# Patient Record
Sex: FEMALE | Race: WHITE | NOT HISPANIC OR LATINO | Employment: PART TIME | URBAN - METROPOLITAN AREA
[De-identification: names, ages, dates, MRNs, and addresses within clinical notes are randomized per-mention and may not be internally consistent; named-entity substitution may affect disease eponyms.]

---

## 2017-08-02 ENCOUNTER — OFFICE VISIT (OUTPATIENT)
Dept: FAMILY MEDICINE CLINIC | Facility: CLINIC | Age: 52
End: 2017-08-02

## 2017-08-02 VITALS
OXYGEN SATURATION: 95 % | BODY MASS INDEX: 24.33 KG/M2 | SYSTOLIC BLOOD PRESSURE: 100 MMHG | HEIGHT: 67 IN | TEMPERATURE: 98.6 F | WEIGHT: 155 LBS | DIASTOLIC BLOOD PRESSURE: 60 MMHG | HEART RATE: 85 BPM

## 2017-08-02 DIAGNOSIS — K64.4 HEMORRHOIDS, EXTERNAL: Primary | ICD-10-CM

## 2017-08-02 PROCEDURE — 99213 OFFICE O/P EST LOW 20 MIN: CPT | Performed by: FAMILY MEDICINE

## 2017-08-02 RX ORDER — ZOLPIDEM TARTRATE 5 MG/1
5 TABLET ORAL NIGHTLY PRN
Qty: 30 TABLET | Refills: 5 | Status: SHIPPED | OUTPATIENT
Start: 2017-08-02 | End: 2018-03-05 | Stop reason: SDUPTHER

## 2017-08-02 RX ORDER — HYDROCORTISONE ACETATE SUPPOSITORY 30 MG/1
1 SUPPOSITORY RECTAL 2 TIMES DAILY
Qty: 24 EACH | Refills: 0 | Status: SHIPPED | OUTPATIENT
Start: 2017-08-02 | End: 2017-09-26

## 2017-08-02 RX ORDER — CLOTRIMAZOLE AND BETAMETHASONE DIPROPIONATE 10; .64 MG/G; MG/G
CREAM TOPICAL 2 TIMES DAILY
Qty: 15 G | Refills: 2 | Status: SHIPPED | OUTPATIENT
Start: 2017-08-02 | End: 2017-09-26

## 2017-08-02 RX ORDER — PAROXETINE 30 MG/1
30 TABLET, FILM COATED ORAL EVERY MORNING
Qty: 30 TABLET | Refills: 5 | Status: SHIPPED | OUTPATIENT
Start: 2017-08-02 | End: 2017-12-13 | Stop reason: SDUPTHER

## 2017-08-02 NOTE — PROGRESS NOTES
Subjective   Sakina Casillas is a 52 y.o. female presenting with   Chief Complaint   Patient presents with   • Hemorrhoids   • Med Refill     PAXIL  AND ZOLPIDEM    • Referral     for colonoscopy         HPI Comments: 52-year-old  white female just moved back here from [place] after her  had an affair with a woman from his home town in [place].  This has been 10 months ago and she is now dating and practicing safe sex.  However for the last 2 months she has had bleeding hemorrhoids and a sore area near her sacrum    Hemorrhoids          The following portions of the patient's history were reviewed and updated as appropriate: current medications, past family history, past medical history, past social history, past surgical history and problem list.    Review of Systems   Gastrointestinal: Positive for anal bleeding, hemorrhoids and rectal pain.   All other systems reviewed and are negative.      Objective   Physical Exam   Constitutional: She is oriented to person, place, and time. She appears well-developed and well-nourished.   HENT:   Head: Normocephalic and atraumatic.   Eyes: EOM are normal. Pupils are equal, round, and reactive to light.   Abdominal: Soft. Bowel sounds are normal. She exhibits no distension. There is no tenderness.   Genitourinary:       There is no rash or tenderness on the right labia.   Musculoskeletal: Normal range of motion.   Neurological: She is alert and oriented to person, place, and time.   Skin: Skin is warm and dry.   Psychiatric: She has a normal mood and affect. Her behavior is normal.   Nursing note and vitals reviewed.      Assessment/Plan   Sakina was seen today for hemorrhoids, med refill and referral.    Diagnoses and all orders for this visit:    Hemorrhoids, external  -     Ambulatory Referral to Colorectal Surgery    Other orders  -     zolpidem (AMBIEN) 5 MG tablet; Take 1 tablet by mouth At Night As Needed (as needed for sleep). For sleep  -     PARoxetine  (PAXIL) 30 MG tablet; Take 1 tablet by mouth Every Morning.  -     clotrimazole-betamethasone (LOTRISONE) 1-0.05 % cream; Apply  topically 2 (Two) Times a Day.  -     Hydrocortisone Acetate 30 MG suppository; Insert 1 suppository into the rectum 2 (Two) Times a Day.                   I would like him to return for another visit in 6 month(s)

## 2017-08-02 NOTE — PATIENT INSTRUCTIONS
This is a very nice 52-year-old who has intermittent bleeding external hemorrhoids and dermatitis.  I will send out suppositories and cream.  I also will request a consult since she is due for screening colonoscopy.

## 2017-08-07 ENCOUNTER — TELEPHONE (OUTPATIENT)
Dept: FAMILY MEDICINE CLINIC | Facility: CLINIC | Age: 52
End: 2017-08-07

## 2017-08-07 NOTE — TELEPHONE ENCOUNTER
Pt called stating the medication she got Friday for the anal fistula is not helping at all.  She stated you told her you could send in something else if the medication does not help.  She did not say which medication is not working.  I tried to call her but no answer.

## 2017-09-26 ENCOUNTER — OFFICE VISIT (OUTPATIENT)
Dept: SURGERY | Facility: CLINIC | Age: 52
End: 2017-09-26

## 2017-09-26 VITALS
OXYGEN SATURATION: 98 % | SYSTOLIC BLOOD PRESSURE: 110 MMHG | DIASTOLIC BLOOD PRESSURE: 78 MMHG | WEIGHT: 162.8 LBS | HEIGHT: 67 IN | HEART RATE: 80 BPM | BODY MASS INDEX: 25.55 KG/M2 | TEMPERATURE: 97.9 F

## 2017-09-26 DIAGNOSIS — Z12.11 ENCOUNTER FOR SCREENING COLONOSCOPY: ICD-10-CM

## 2017-09-26 DIAGNOSIS — K64.5 THROMBOSED HEMORRHOIDS: Primary | ICD-10-CM

## 2017-09-26 PROCEDURE — 99244 OFF/OP CNSLTJ NEW/EST MOD 40: CPT | Performed by: COLON & RECTAL SURGERY

## 2017-09-26 RX ORDER — SODIUM CHLORIDE, SODIUM LACTATE, POTASSIUM CHLORIDE, CALCIUM CHLORIDE 600; 310; 30; 20 MG/100ML; MG/100ML; MG/100ML; MG/100ML
30 INJECTION, SOLUTION INTRAVENOUS CONTINUOUS
Status: CANCELLED | OUTPATIENT
Start: 2017-11-09

## 2017-09-26 NOTE — PROGRESS NOTES
Sakina Casillas is a 52 y.o. female who is seen as a consult at the request of Maciel De Los Santos MD for rectal bleeding, constipation    HPI:    Pt states she had a bout of constipation a few weeks ago  Had some bleeding and a fissure  She was moving from [place] to [place]    Has never had a colonoscopy    Fissure beginning 8 weeks ago    Using miralax, which has helped    FamHx: colon polyps mother     HC suppository from Dr. De Los Santos: did not use  1st ointment from Dr. De Los Santos lotrisone did not help; is now trying bactroban, which has helped    Not having any blood now    No pain with BMs    Does c/o swelling    Has never had anorectal surgery    Taking spironolactone for hormonal acne; has not noted any constipation due to this    Past Medical History:   Diagnosis Date   • Acquired hypothyroidism    • Anxiety    • Depression    • Hemorrhoids    • Joint pain     elbow   • Primary insomnia        Past Surgical History:   Procedure Laterality Date   • BREAST SURGERY Bilateral     augmentation   •  SECTION     • FOOT SURGERY Right 2016   • GALLBLADDER SURGERY  2016    done [place]; physician unknown   • NEUROMA SURGERY Right 02/10/2016    right third intermetatarsal space; [facility]; Lee James DPM       Social History:   reports that she has never smoked. She has never used smokeless tobacco. She reports that she does not drink alcohol.      Marriage status:     Family History   Problem Relation Age of Onset   • Heart disease Mother    • Cancer Mother      colon   • Hypertension Mother    • Depression Mother    • Heart disease Father    • Depression Father    • No Known Problems Daughter    • No Known Problems Son          Current Outpatient Prescriptions:   •  butalbital-aspirin-caffeine (FIORINAL) -40 MG per capsule, Take 1 capsule by mouth every 6 (six) hours as needed for headaches., Disp: 20 capsule, Rfl: 0  •  clotrimazole-betamethasone (LOTRISONE) 1-0.05 % cream, Apply   topically 2 (Two) Times a Day., Disp: 15 g, Rfl: 2  •  cycloSPORINE (RESTASIS) 0.05 % ophthalmic emulsion, Apply to eye., Disp: , Rfl:   •  Hydrocortisone Acetate 30 MG suppository, Insert 1 suppository into the rectum 2 (Two) Times a Day., Disp: 24 each, Rfl: 0  •  mupirocin (BACTROBAN) 2 % ointment, Apply  topically 2 (Two) Times a Day., Disp: 30 g, Rfl: 2  •  PARoxetine (PAXIL) 30 MG tablet, Take 1 tablet by mouth Every Morning., Disp: 30 tablet, Rfl: 5  •  spironolactone (ALDACTONE) 25 MG tablet, TAKE 1 TABLET BY MOUTH EVERY DAY, Disp: 30 tablet, Rfl: 1  •  zolpidem (AMBIEN) 5 MG tablet, Take 1 tablet by mouth At Night As Needed (as needed for sleep). For sleep, Disp: 30 tablet, Rfl: 5    Allergy  Review of patient's allergies indicates no known allergies.    Review of Systems   Constitution: Negative for decreased appetite, weakness and weight gain.   HENT: Negative for congestion, hearing loss and hoarse voice.    Eyes: Negative for blurred vision, discharge and visual disturbance.   Cardiovascular: Negative for chest pain, cyanosis and leg swelling.   Respiratory: Negative for cough, shortness of breath, sleep disturbances due to breathing and snoring.    Endocrine: Negative for cold intolerance and heat intolerance.   Hematologic/Lymphatic: Does not bruise/bleed easily.   Skin: Negative for itching, poor wound healing and skin cancer.   Musculoskeletal: Positive for arthritis. Negative for back pain, joint pain and joint swelling.   Gastrointestinal: Positive for constipation. Negative for abdominal pain, change in bowel habit and bowel incontinence.   Genitourinary: Negative for bladder incontinence, dysuria and hematuria.   Neurological: Negative for brief paralysis, excessive daytime sleepiness, dizziness, focal weakness, headaches and light-headedness.   Psychiatric/Behavioral: Negative for altered mental status and hallucinations. The patient does not have insomnia.    Allergic/Immunologic: Negative for  HIV exposure and persistent infections.       Vitals:    09/26/17 0847   BP: 110/78   Pulse: 80   Temp: 97.9 °F (36.6 °C)   SpO2: 98%     Body mass index is 25.5 kg/(m^2).    Physical Exam   Constitutional: She is oriented to person, place, and time. She appears well-developed and well-nourished. No distress.   HENT:   Head: Normocephalic and atraumatic.   Nose: Nose normal.   Mouth/Throat: Oropharynx is clear and moist.   Eyes: Conjunctivae and EOM are normal. Pupils are equal, round, and reactive to light.   Neck: Normal range of motion. No tracheal deviation present.   Pulmonary/Chest: Effort normal and breath sounds normal. No respiratory distress.   Abdominal: Soft. Bowel sounds are normal. She exhibits no distension.   Genitourinary:   Genitourinary Comments: Perianal exam: external hem - small resolving posterior thrombosed hemorrhoid.  No ulceration, no evidence incarceration. No fissure visualized.  Minor tags x3   Musculoskeletal: Normal range of motion. She exhibits no edema or deformity.   Neurological: She is alert and oriented to person, place, and time. No cranial nerve deficit. Coordination and gait normal.   Skin: Skin is warm and dry.   Psychiatric: She has a normal mood and affect. Her behavior is normal. Judgment normal.       Review of Medical Record:  I reviewed Dr. De Los Santos OV note: rb, anal pain    Assessment:  1. Thrombosed hemorrhoids    2. Encounter for screening colonoscopy        Plan:    Can d/c bactroban cream.  Discussed with patient that as thrombosed hemorrhoid resolving, no indication for surgical excision, as her body will continue to resorb clot.    To optimize stool consistency and help prevent future hemorrhoidal issues, I recommend fiber therapy and detailed and gave written instructions on how to achieve a high fiber diet.    As pt is 52 years old and has never had a colonoscopy, I recommend doing a screening colonoscopy.  I described the patient risks benefits and alternatives  and she wishes to proceed.      Scribed for Jovanni Bradshaw MD by Ariana Wall PA-C 9/26/2017  This patient was evaluated by me, recommendations made, documentation reviewed, edited, and revised by me, Jovanni Bradshaw MD

## 2017-10-06 ENCOUNTER — OFFICE VISIT (OUTPATIENT)
Dept: FAMILY MEDICINE CLINIC | Facility: CLINIC | Age: 52
End: 2017-10-06

## 2017-10-06 VITALS
HEART RATE: 87 BPM | OXYGEN SATURATION: 97 % | TEMPERATURE: 98.5 F | HEIGHT: 67 IN | DIASTOLIC BLOOD PRESSURE: 60 MMHG | SYSTOLIC BLOOD PRESSURE: 98 MMHG | WEIGHT: 164 LBS | BODY MASS INDEX: 25.74 KG/M2

## 2017-10-06 DIAGNOSIS — M25.562 ACUTE PAIN OF LEFT KNEE: Primary | ICD-10-CM

## 2017-10-06 DIAGNOSIS — E03.9 ACQUIRED HYPOTHYROIDISM: ICD-10-CM

## 2017-10-06 PROCEDURE — 73560 X-RAY EXAM OF KNEE 1 OR 2: CPT | Performed by: FAMILY MEDICINE

## 2017-10-06 PROCEDURE — 99213 OFFICE O/P EST LOW 20 MIN: CPT | Performed by: FAMILY MEDICINE

## 2017-10-06 NOTE — PROGRESS NOTES
Subjective   Sakina Casillas is a 52 y.o. female presenting with   Chief Complaint   Patient presents with   • Knee Pain     twisted left knee x 3 weeks ago         HPI Comments: 3 weeks ago this 52-year-old white female nonsmoker twisted her knee.  She says it has continued to hurt despite the use of an over-the-counter knee brace.  She says it feels like there is something wrong internally.    She does not have any history of past injury to that knee and has not had surgery on that knee.  She denies that there is any other joint pain.    Knee Pain           The following portions of the patient's history were reviewed and updated as appropriate: current medications, past family history, past medical history, past social history, past surgical history and problem list.    Review of Systems   Musculoskeletal: Positive for arthralgias and joint swelling.   All other systems reviewed and are negative.      Objective   Physical Exam   Constitutional: She is oriented to person, place, and time. She appears well-developed and well-nourished. No distress.   HENT:   Head: Normocephalic and atraumatic.   Eyes: EOM are normal. Pupils are equal, round, and reactive to light.   Neck: Normal range of motion. Neck supple.   Musculoskeletal: She exhibits edema and tenderness. She exhibits no deformity.        Left knee: She exhibits effusion and abnormal meniscus. She exhibits no erythema, no LCL laxity and no MCL laxity. Tenderness found. No MCL and no LCL tenderness noted.        Legs:  Neurological: She is alert and oriented to person, place, and time.   Skin: Skin is warm and dry. She is not diaphoretic.   Psychiatric: She has a normal mood and affect. Her behavior is normal.   Nursing note and vitals reviewed.      Assessment/Plan   Sakina was seen today for knee pain.    Diagnoses and all orders for this visit:    Acute pain of left knee  -     XR Knee 1 or 2 View Left    Acquired hypothyroidism                   I would like him  to return for another visit in 6 month(s)

## 2017-10-06 NOTE — PATIENT INSTRUCTIONS
This is a very nice 52-year-old with acute left knee pain after twisting it 3 weeks ago.  I will request an orthopedic consult since there is nothing obvious on x-ray to explain her discomfort.

## 2017-10-06 NOTE — PROGRESS NOTES
Procedure   Procedures        X Ray report:    To further evaluate her complaint of knee pain for the last 3 weeks I have requested a knee x-ray here today.  There are no old x-rays to compare this to.  This shows good preservation of the joint space with minor effusion.  There is slight calcific tendinitis on the medial aspect of the knee but there is no subluxation or foreign body or fracture or dislocation.

## 2017-11-09 ENCOUNTER — ANESTHESIA (OUTPATIENT)
Dept: GASTROENTEROLOGY | Facility: HOSPITAL | Age: 52
End: 2017-11-09

## 2017-11-09 ENCOUNTER — HOSPITAL ENCOUNTER (OUTPATIENT)
Facility: HOSPITAL | Age: 52
Setting detail: HOSPITAL OUTPATIENT SURGERY
Discharge: HOME | End: 2017-11-09
Attending: COLON & RECTAL SURGERY | Admitting: COLON & RECTAL SURGERY

## 2017-11-09 ENCOUNTER — ANESTHESIA EVENT (OUTPATIENT)
Dept: GASTROENTEROLOGY | Facility: HOSPITAL | Age: 52
End: 2017-11-09

## 2017-11-09 VITALS
DIASTOLIC BLOOD PRESSURE: 70 MMHG | HEART RATE: 72 BPM | OXYGEN SATURATION: 100 % | WEIGHT: 162 LBS | TEMPERATURE: 97.2 F | SYSTOLIC BLOOD PRESSURE: 118 MMHG | BODY MASS INDEX: 25.43 KG/M2 | RESPIRATION RATE: 16 BRPM | HEIGHT: 67 IN

## 2017-11-09 DIAGNOSIS — Z12.11 ENCOUNTER FOR SCREENING COLONOSCOPY: ICD-10-CM

## 2017-11-09 PROCEDURE — 45378 DIAGNOSTIC COLONOSCOPY: CPT | Performed by: COLON & RECTAL SURGERY

## 2017-11-09 PROCEDURE — 25010000002 PROPOFOL 10 MG/ML EMULSION: Performed by: ANESTHESIOLOGY

## 2017-11-09 RX ORDER — PROPOFOL 10 MG/ML
VIAL (ML) INTRAVENOUS AS NEEDED
Status: DISCONTINUED | OUTPATIENT
Start: 2017-11-09 | End: 2017-11-09 | Stop reason: SURG

## 2017-11-09 RX ORDER — PROPOFOL 10 MG/ML
VIAL (ML) INTRAVENOUS CONTINUOUS PRN
Status: DISCONTINUED | OUTPATIENT
Start: 2017-11-09 | End: 2017-11-09 | Stop reason: SURG

## 2017-11-09 RX ORDER — SODIUM CHLORIDE, SODIUM LACTATE, POTASSIUM CHLORIDE, CALCIUM CHLORIDE 600; 310; 30; 20 MG/100ML; MG/100ML; MG/100ML; MG/100ML
30 INJECTION, SOLUTION INTRAVENOUS CONTINUOUS
Status: DISCONTINUED | OUTPATIENT
Start: 2017-11-09 | End: 2017-11-09 | Stop reason: HOSPADM

## 2017-11-09 RX ORDER — LIDOCAINE HYDROCHLORIDE 20 MG/ML
INJECTION, SOLUTION INFILTRATION; PERINEURAL AS NEEDED
Status: DISCONTINUED | OUTPATIENT
Start: 2017-11-09 | End: 2017-11-09 | Stop reason: SURG

## 2017-11-09 RX ADMIN — LIDOCAINE HYDROCHLORIDE 50 MG: 20 INJECTION, SOLUTION INFILTRATION; PERINEURAL at 12:22

## 2017-11-09 RX ADMIN — SODIUM CHLORIDE, POTASSIUM CHLORIDE, SODIUM LACTATE AND CALCIUM CHLORIDE 30 ML/HR: 600; 310; 30; 20 INJECTION, SOLUTION INTRAVENOUS at 11:07

## 2017-11-09 RX ADMIN — PROPOFOL 140 MG: 10 INJECTION, EMULSION INTRAVENOUS at 12:22

## 2017-11-09 RX ADMIN — PROPOFOL 140 MCG/KG/MIN: 10 INJECTION, EMULSION INTRAVENOUS at 12:24

## 2017-11-09 NOTE — ANESTHESIA PREPROCEDURE EVALUATION
Anesthesia Evaluation     Patient summary reviewed and Nursing notes reviewed   no history of anesthetic complications:  NPO Solid Status: > 8 hours  NPO Liquid Status: > 2 hours     Airway   Mallampati: II  TM distance: >3 FB  Neck ROM: full  no difficulty expected  Dental - normal exam     Pulmonary - negative pulmonary ROS and normal exam    breath sounds clear to auscultation  Cardiovascular - negative cardio ROS and normal exam    Rhythm: regular  Rate: normal        Neuro/Psych- negative ROS  GI/Hepatic/Renal/Endo    (+)  hypothyroidism,     Musculoskeletal (-) negative ROS    Abdominal  - normal exam   Substance History - negative use     OB/GYN negative ob/gyn ROS         Other - negative ROS                                       Anesthesia Plan    ASA 2     MAC     intravenous induction   Anesthetic plan and risks discussed with patient.

## 2017-11-09 NOTE — H&P
Sakina Casillas is a 52 y.o. female  who is referred by Jovanni Bradshaw MD for a colonoscopy. She is an  has a history of screening for colon cancer.     She denies any change in bowel function, melena, or hematochezia.    Past Medical History:   Diagnosis Date   • Acquired hypothyroidism    • Anxiety    • Depression    • Hemorrhoids    • Joint pain     elbow   • Primary insomnia        Past Surgical History:   Procedure Laterality Date   • BREAST SURGERY Bilateral     augmentation   •  SECTION     • FOOT SURGERY Right 2016   • GALLBLADDER SURGERY  2016    done [place]; physician unknown   • NEUROMA SURGERY Right 02/10/2016    right third intermetatarsal space; [facility]; Lee James DPM       Prescriptions Prior to Admission   Medication Sig Dispense Refill Last Dose   • butalbital-aspirin-caffeine (FIORINAL) -40 MG per capsule Take 1 capsule by mouth every 6 (six) hours as needed for headaches. 20 capsule 0 2017 at Unknown time   • cycloSPORINE (RESTASIS) 0.05 % ophthalmic emulsion Apply to eye.   2017 at Unknown time   • PARoxetine (PAXIL) 30 MG tablet Take 1 tablet by mouth Every Morning. 30 tablet 5 Past Week at Unknown time   • spironolactone (ALDACTONE) 25 MG tablet TAKE 1 TABLET BY MOUTH EVERY DAY 30 tablet 1 Past Week at Unknown time   • zolpidem (AMBIEN) 5 MG tablet Take 1 tablet by mouth At Night As Needed (as needed for sleep). For sleep 30 tablet 5 Past Week at Unknown time       No Known Allergies    Family History   Problem Relation Age of Onset   • Heart disease Mother    • Cancer Mother      colon   • Hypertension Mother    • Depression Mother    • Heart disease Father    • Depression Father    • No Known Problems Daughter    • No Known Problems Son        Social History     Social History   • Marital status:      Spouse name: N/A   • Number of children: N/A   • Years of education: N/A     Occupational History   • Not on file.     Social History Main Topics    • Smoking status: Never Smoker   • Smokeless tobacco: Never Used   • Alcohol use No   • Drug use: Not on file   • Sexual activity: Not on file     Other Topics Concern   • Not on file     Social History Narrative       Review of Systems   Gastrointestinal: Negative for abdominal pain, nausea and vomiting.   All other systems reviewed and are negative.      Vitals:    11/09/17 1101   BP: 104/63   Pulse: 74   Resp: 18   Temp: 98.2 °F (36.8 °C)   SpO2: 96%         Physical Exam   Constitutional: She is oriented to person, place, and time. She appears well-developed and well-nourished.   HENT:   Head: Normocephalic and atraumatic.   Eyes: EOM are normal. Pupils are equal, round, and reactive to light.   Cardiovascular: Regular rhythm.    Pulmonary/Chest: Effort normal.   Abdominal: Soft. She exhibits no distension.   Musculoskeletal: Normal range of motion.   Neurological: She is alert and oriented to person, place, and time.   Skin: Skin is warm and dry.   Psychiatric: Judgment and thought content normal.         Assessment/Plan      screening for colon cancer.         I recommend colonoscopy.  I described risks, benefits of the procedure with the patient including but not limited to bleeding, infection, possibility of perforation and possible polypectomy. All of the patient's questions were answered and they would like to proceed with the above recommendations.

## 2017-11-09 NOTE — ANESTHESIA POSTPROCEDURE EVALUATION
Patient: Sakina Casillas    Procedure Summary     Date Anesthesia Start Anesthesia Stop Room / Location    11/09/17 1220 1249  [facility] /  [facility]       Procedure Diagnosis Surgeon Provider    COLONOSCOPY TO [facility]EC[facility] (N/A ) Encounter for screening colonoscopy  (Encounter for screening colonoscopy [Z12.11]) MD Cesia Singer MD          Anesthesia Type: MAC  Last vitals  BP   118/70 (11/09/17 1318)   Temp   36.2 °C (97.2 °F) (11/09/17 1310)   Pulse   72 (11/09/17 1318)   Resp   16 (11/09/17 1318)     SpO2   100 % (11/09/17 1318)     Post Anesthesia Care and Evaluation    Patient location during evaluation: PACU  Patient participation: complete - patient participated  Level of consciousness: awake  Pain management: adequate  Airway patency: patent  Anesthetic complications: No anesthetic complications  PONV Status: none  Cardiovascular status: acceptable  Respiratory status: acceptable  Hydration status: acceptable

## 2017-11-09 NOTE — PLAN OF CARE
Problem: Patient Care Overview (Adult)  Goal: Adult Individualization and Mutuality  Outcome: Ongoing (interventions implemented as appropriate)    11/09/17 1059   Individualization   Patient Specific Interventions denies       Goal: Discharge Needs Assessment  Outcome: Ongoing (interventions implemented as appropriate)    11/09/17 1059   Discharge Needs Assessment   Concerns To Be Addressed no discharge needs identified   Discharge Disposition home or self-care   Self-Care   Equipment Currently Used at Home none         Problem: GI Endoscopy (Adult)  Goal: Signs and Symptoms of Listed Potential Problems Will be Absent or Manageable (GI Endoscopy)  Outcome: Ongoing (interventions implemented as appropriate)    11/09/17 1059   GI Endoscopy   Problems Assessed (GI Endoscopy) pain;bleeding;fluid imbalance;hypoxia/hypoxemia   Problems Present (GI Endoscopy) none

## 2017-12-13 ENCOUNTER — OFFICE VISIT (OUTPATIENT)
Dept: FAMILY MEDICINE CLINIC | Facility: CLINIC | Age: 52
End: 2017-12-13

## 2017-12-13 VITALS
BODY MASS INDEX: 26.21 KG/M2 | DIASTOLIC BLOOD PRESSURE: 62 MMHG | HEIGHT: 67 IN | OXYGEN SATURATION: 98 % | WEIGHT: 167 LBS | TEMPERATURE: 98.4 F | HEART RATE: 73 BPM | SYSTOLIC BLOOD PRESSURE: 100 MMHG

## 2017-12-13 DIAGNOSIS — E03.9 ACQUIRED HYPOTHYROIDISM: Primary | ICD-10-CM

## 2017-12-13 DIAGNOSIS — F51.01 PRIMARY INSOMNIA: ICD-10-CM

## 2017-12-13 DIAGNOSIS — N95.1 MENOPAUSE SYNDROME: ICD-10-CM

## 2017-12-13 DIAGNOSIS — H60.312 ACUTE DIFFUSE OTITIS EXTERNA OF LEFT EAR: ICD-10-CM

## 2017-12-13 PROCEDURE — 99213 OFFICE O/P EST LOW 20 MIN: CPT | Performed by: FAMILY MEDICINE

## 2017-12-13 RX ORDER — PAROXETINE 30 MG/1
30 TABLET, FILM COATED ORAL EVERY MORNING
Qty: 30 TABLET | Refills: 5 | Status: SHIPPED | OUTPATIENT
Start: 2017-12-13 | End: 2018-06-18 | Stop reason: SDUPTHER

## 2017-12-13 RX ORDER — SPIRONOLACTONE 25 MG/1
25 TABLET ORAL DAILY
Qty: 90 TABLET | Refills: 1 | Status: SHIPPED | OUTPATIENT
Start: 2017-12-13 | End: 2018-06-24 | Stop reason: SDUPTHER

## 2017-12-13 NOTE — PATIENT INSTRUCTIONS
This is a very nice 52-year-old who is here for follow-up and also has been experiencing menopause symptoms.  I will request blood work and notify her when the results are available.

## 2017-12-13 NOTE — PROGRESS NOTES
Subjective   Sakina Casillas is a 52 y.o. female presenting with   Chief Complaint   Patient presents with   • Medication Check Up   • Med Refill     paxil and spironolactone         HPI Comments: 52-year-old  white female nonsmoker here for routine follow-up for anxiety.  She says that the medication is working well and she just needs a refill.    She also says she has not had a menstrual period in 13 months and she has begun to develop symptoms she thinks is menopause.  This includes trouble sleeping and occasional hot flashes.  She would like to have her hormones checked.    She also says she has unexpectedly gained 5 pounds and is concerned that she needs her thyroid rechecked.  She is requesting we do that today.    She also mentions that she put a Q-tip in her left ear and now her left ear is hurting.  I asked her to never do that again and explained that the ear canal was very fragile and you can cut it even with a Q-tip.       The following portions of the patient's history were reviewed and updated as appropriate: current medications, past family history, past medical history, past social history, past surgical history and problem list.    Review of Systems   Constitutional: Positive for unexpected weight change.   HENT: Positive for ear pain.    Psychiatric/Behavioral: Positive for sleep disturbance. The patient is nervous/anxious.    All other systems reviewed and are negative.      Objective   Physical Exam   Constitutional: She is oriented to person, place, and time. She appears well-developed and well-nourished. No distress.   HENT:   Head: Normocephalic and atraumatic.   Right Ear: External ear normal.   Left Ear: Left ear exhibits lacerations.   Ears:    Mouth/Throat: Oropharynx is clear and moist.   Eyes: EOM are normal. Pupils are equal, round, and reactive to light.   Neck: Normal range of motion. Neck supple. No thyromegaly present.   Cardiovascular: Normal rate and regular rhythm.     Pulmonary/Chest: Effort normal and breath sounds normal.   Musculoskeletal: Normal range of motion. She exhibits no edema or tenderness.   Lymphadenopathy:     She has no cervical adenopathy.   Neurological: She is alert and oriented to person, place, and time.   Skin: Skin is warm and dry. She is not diaphoretic.   Psychiatric: She has a normal mood and affect. Her behavior is normal.   Nursing note and vitals reviewed.      Assessment/Plan   Sakina was seen today for medication check up and med refill.    Diagnoses and all orders for this visit:    Acquired hypothyroidism  -     Comprehensive Metabolic Panel  -     TSH  -     CBC & Differential  -     T4, Free    Primary insomnia    Menopause syndrome  -     Comprehensive Metabolic Panel  -     TSH  -     CBC & Differential  -     T4, Free  -     FSH & LH    Acute diffuse otitis externa of left ear    Other orders  -     neomycin-polymyxin-hydrocortisone (CORTISPORIN) 3.5-63594-1 otic solution; Administer 3 drops into the left ear 3 (Three) Times a Day.  -     PARoxetine (PAXIL) 30 MG tablet; Take 1 tablet by mouth Every Morning.  -     spironolactone (ALDACTONE) 25 MG tablet; Take 1 tablet by mouth Daily.                   I would like him to return for another visit in 6 month(s)

## 2017-12-15 LAB
ALBUMIN SERPL-MCNC: 4.1 G/DL (ref 3.5–5.2)
ALBUMIN/GLOB SERPL: 1.3 G/DL
ALP SERPL-CCNC: 83 U/L (ref 39–117)
ALT SERPL-CCNC: 18 U/L (ref 1–33)
AST SERPL-CCNC: 21 U/L (ref 1–32)
BASOPHILS # BLD AUTO: 0.03 10*3/MM3 (ref 0–0.2)
BASOPHILS NFR BLD AUTO: 0.4 % (ref 0–1.5)
BILIRUB SERPL-MCNC: 0.2 MG/DL (ref 0.1–1.2)
BUN SERPL-MCNC: 16 MG/DL (ref 6–20)
BUN/CREAT SERPL: 21.3 (ref 7–25)
CALCIUM SERPL-MCNC: 8.7 MG/DL (ref 8.6–10.5)
CHLORIDE SERPL-SCNC: 102 MMOL/L (ref 98–107)
CO2 SERPL-SCNC: 28 MMOL/L (ref 22–29)
CREAT SERPL-MCNC: 0.75 MG/DL (ref 0.57–1)
EOSINOPHIL # BLD AUTO: 0.05 10*3/MM3 (ref 0–0.7)
EOSINOPHIL NFR BLD AUTO: 0.6 % (ref 0.3–6.2)
ERYTHROCYTE [DISTWIDTH] IN BLOOD BY AUTOMATED COUNT: 13.3 % (ref 11.7–13)
FSH SERPL-ACNC: 67.1 MIU/ML
GFR SERPLBLD CREATININE-BSD FMLA CKD-EPI: 81 ML/MIN/1.73
GFR SERPLBLD CREATININE-BSD FMLA CKD-EPI: 99 ML/MIN/1.73
GLOBULIN SER CALC-MCNC: 3.2 GM/DL
GLUCOSE SERPL-MCNC: 117 MG/DL (ref 65–99)
HCT VFR BLD AUTO: 42.5 % (ref 35.6–45.5)
HGB BLD-MCNC: 13.2 G/DL (ref 11.9–15.5)
IMM GRANULOCYTES # BLD: 0 10*3/MM3 (ref 0–0.03)
IMM GRANULOCYTES NFR BLD: 0 % (ref 0–0.5)
LH SERPL-ACNC: 37.6 MIU/ML
LYMPHOCYTES # BLD AUTO: 2.37 10*3/MM3 (ref 0.9–4.8)
LYMPHOCYTES NFR BLD AUTO: 29.9 % (ref 19.6–45.3)
MCH RBC QN AUTO: 29.7 PG (ref 26.9–32)
MCHC RBC AUTO-ENTMCNC: 31.1 G/DL (ref 32.4–36.3)
MCV RBC AUTO: 95.7 FL (ref 80.5–98.2)
MONOCYTES # BLD AUTO: 0.31 10*3/MM3 (ref 0.2–1.2)
MONOCYTES NFR BLD AUTO: 3.9 % (ref 5–12)
NEUTROPHILS # BLD AUTO: 5.16 10*3/MM3 (ref 1.9–8.1)
NEUTROPHILS NFR BLD AUTO: 65.2 % (ref 42.7–76)
PLATELET # BLD AUTO: 233 10*3/MM3 (ref 140–500)
POTASSIUM SERPL-SCNC: 4.1 MMOL/L (ref 3.5–5.2)
PROT SERPL-MCNC: 7.3 G/DL (ref 6–8.5)
RBC # BLD AUTO: 4.44 10*6/MM3 (ref 3.9–5.2)
SODIUM SERPL-SCNC: 140 MMOL/L (ref 136–145)
T4 FREE SERPL-MCNC: 1.22 NG/DL (ref 0.93–1.7)
TSH SERPL DL<=0.005 MIU/L-ACNC: 4.13 MIU/ML (ref 0.27–4.2)
WBC # BLD AUTO: 7.92 10*3/MM3 (ref 4.5–10.7)

## 2018-02-26 ENCOUNTER — APPOINTMENT (OUTPATIENT)
Dept: WOMENS IMAGING | Facility: HOSPITAL | Age: 53
End: 2018-02-26

## 2018-02-26 PROCEDURE — 77063 BREAST TOMOSYNTHESIS BI: CPT | Performed by: RADIOLOGY

## 2018-02-26 PROCEDURE — 77067 SCR MAMMO BI INCL CAD: CPT | Performed by: RADIOLOGY

## 2018-02-28 ENCOUNTER — LAB REQUISITION (OUTPATIENT)
Dept: LAB | Facility: HOSPITAL | Age: 53
End: 2018-02-28

## 2018-02-28 DIAGNOSIS — G57.62 LESION OF LEFT PLANTAR NERVE: ICD-10-CM

## 2018-02-28 PROCEDURE — 88304 TISSUE EXAM BY PATHOLOGIST: CPT | Performed by: PODIATRIST

## 2018-03-01 LAB
CYTO UR: NORMAL
LAB AP CASE REPORT: NORMAL
LAB AP CLINICAL INFORMATION: NORMAL
Lab: NORMAL
PATH REPORT.FINAL DX SPEC: NORMAL
PATH REPORT.GROSS SPEC: NORMAL

## 2018-03-06 RX ORDER — ZOLPIDEM TARTRATE 5 MG/1
TABLET ORAL
Qty: 30 TABLET | Refills: 2 | OUTPATIENT
Start: 2018-03-06 | End: 2018-08-04 | Stop reason: SDUPTHER

## 2018-06-18 RX ORDER — PAROXETINE 30 MG/1
30 TABLET, FILM COATED ORAL EVERY MORNING
Qty: 30 TABLET | Refills: 1 | Status: SHIPPED | OUTPATIENT
Start: 2018-06-18 | End: 2018-06-21 | Stop reason: SDUPTHER

## 2018-06-21 RX ORDER — PAROXETINE 30 MG/1
30 TABLET, FILM COATED ORAL EVERY MORNING
Qty: 90 TABLET | Refills: 0 | Status: SHIPPED | OUTPATIENT
Start: 2018-06-21 | End: 2018-09-21 | Stop reason: SDUPTHER

## 2018-06-25 RX ORDER — SPIRONOLACTONE 25 MG/1
25 TABLET ORAL DAILY
Qty: 90 TABLET | Refills: 1 | Status: SHIPPED | OUTPATIENT
Start: 2018-06-25 | End: 2018-12-05

## 2018-08-06 RX ORDER — ZOLPIDEM TARTRATE 5 MG/1
TABLET ORAL
Qty: 30 TABLET | Refills: 0 | OUTPATIENT
Start: 2018-08-06 | End: 2018-09-11 | Stop reason: SDUPTHER

## 2018-09-07 RX ORDER — ZOLPIDEM TARTRATE 5 MG/1
TABLET ORAL
Qty: 30 TABLET | Refills: 0 | OUTPATIENT
Start: 2018-09-07

## 2018-09-12 RX ORDER — ZOLPIDEM TARTRATE 5 MG/1
5 TABLET ORAL
Qty: 30 TABLET | Refills: 2 | OUTPATIENT
Start: 2018-09-28 | End: 2018-09-14 | Stop reason: SDUPTHER

## 2018-09-14 ENCOUNTER — OFFICE VISIT (OUTPATIENT)
Dept: FAMILY MEDICINE CLINIC | Facility: CLINIC | Age: 53
End: 2018-09-14

## 2018-09-14 VITALS
SYSTOLIC BLOOD PRESSURE: 108 MMHG | TEMPERATURE: 98.9 F | BODY MASS INDEX: 25.3 KG/M2 | HEIGHT: 67 IN | OXYGEN SATURATION: 97 % | DIASTOLIC BLOOD PRESSURE: 62 MMHG | WEIGHT: 161.2 LBS | HEART RATE: 95 BPM

## 2018-09-14 DIAGNOSIS — F51.01 PRIMARY INSOMNIA: ICD-10-CM

## 2018-09-14 DIAGNOSIS — F40.243 FEAR OF FLYING: Primary | ICD-10-CM

## 2018-09-14 PROCEDURE — 99213 OFFICE O/P EST LOW 20 MIN: CPT | Performed by: FAMILY MEDICINE

## 2018-09-14 RX ORDER — ALPRAZOLAM 0.5 MG/1
TABLET ORAL
Qty: 6 TABLET | Refills: 0 | OUTPATIENT
Start: 2018-09-14 | End: 2018-12-05

## 2018-09-14 RX ORDER — ZOLPIDEM TARTRATE 5 MG/1
5 TABLET ORAL
Qty: 30 TABLET | Refills: 5 | Status: SHIPPED | OUTPATIENT
Start: 2018-09-28 | End: 2019-03-13 | Stop reason: SDUPTHER

## 2018-09-14 NOTE — PROGRESS NOTES
Subjective   Sakina Casillas is a 53 y.o. female presenting with   Chief Complaint   Patient presents with   • Insomnia     follow up   • Med Refill        This is a 53-year-old female who has a long problem with insomnia but says she gets excellent results with her Ambien and does not want to discontinue that at this time.    She also has a trip planned to [place], but has a fear of flying.  She asked for a few Xanax to help with this.  I told her that it was addictive but if she tries just a few I was okay with that.         The following portions of the patient's history were reviewed and updated as appropriate: current medications, past family history, past medical history, past social history, past surgical history and problem list.    Review of Systems   Psychiatric/Behavioral: Positive for sleep disturbance. The patient is nervous/anxious.    All other systems reviewed and are negative.      Objective   Physical Exam   Constitutional: She is oriented to person, place, and time. She appears well-developed and well-nourished.   HENT:   Head: Normocephalic and atraumatic.   Eyes: Pupils are equal, round, and reactive to light. EOM are normal.   Neck: Normal range of motion. Neck supple.   Cardiovascular: Normal rate and regular rhythm.    Pulmonary/Chest: Effort normal and breath sounds normal.   Musculoskeletal: Normal range of motion.   Neurological: She is alert and oriented to person, place, and time.   Skin: Skin is warm and dry.   Psychiatric: She has a normal mood and affect. Her behavior is normal.   Nursing note and vitals reviewed.      Assessment/Plan   Sakina was seen today for insomnia and med refill.    Diagnoses and all orders for this visit:    Fear of flying    Primary insomnia    Other orders  -     zolpidem (AMBIEN) 5 MG tablet; Take 1 tablet by mouth every night at bedtime.  -     ALPRAZolam (XANAX) 0.5 MG tablet; Take one as needed before flying                   I would like him to return for  another visit in 6 month(s)

## 2018-09-14 NOTE — PATIENT INSTRUCTIONS
This is a very nice 53-year-old who is here for follow-up.  I have renewed her prescription but would like her to call if there is a problem.

## 2018-09-21 RX ORDER — PAROXETINE 30 MG/1
TABLET, FILM COATED ORAL
Qty: 90 TABLET | Refills: 0 | Status: SHIPPED | OUTPATIENT
Start: 2018-09-21 | End: 2019-01-03 | Stop reason: SDUPTHER

## 2018-10-10 ENCOUNTER — TELEPHONE (OUTPATIENT)
Dept: FAMILY MEDICINE CLINIC | Facility: CLINIC | Age: 53
End: 2018-10-10

## 2018-12-05 ENCOUNTER — OFFICE VISIT (OUTPATIENT)
Dept: SURGERY | Facility: CLINIC | Age: 53
End: 2018-12-05

## 2018-12-05 VITALS
HEIGHT: 67 IN | BODY MASS INDEX: 25.27 KG/M2 | DIASTOLIC BLOOD PRESSURE: 78 MMHG | TEMPERATURE: 98.2 F | HEART RATE: 106 BPM | SYSTOLIC BLOOD PRESSURE: 102 MMHG | WEIGHT: 161 LBS | OXYGEN SATURATION: 98 %

## 2018-12-05 DIAGNOSIS — K64.8 INTERNAL HEMORRHOIDS WITH COMPLICATION: Primary | ICD-10-CM

## 2018-12-05 DIAGNOSIS — K64.4 EXTERNAL HEMORRHOIDS WITH COMPLICATION: ICD-10-CM

## 2018-12-05 DIAGNOSIS — K64.4 ANAL SKIN TAG: ICD-10-CM

## 2018-12-05 DIAGNOSIS — K62.89 PERIRECTAL SKIN IRRITATION: ICD-10-CM

## 2018-12-05 PROCEDURE — 99213 OFFICE O/P EST LOW 20 MIN: CPT | Performed by: COLON & RECTAL SURGERY

## 2018-12-05 PROCEDURE — 46600 DIAGNOSTIC ANOSCOPY SPX: CPT | Performed by: COLON & RECTAL SURGERY

## 2018-12-05 NOTE — PROGRESS NOTES
"Sakina HURST is a 53 y.o. female in for follow up of Internal hemorrhoids with complication    External hemorrhoids with complication    Perirectal skin irritation    Anal skin tag    Pt states she is having more issues with hemorrhoids    Itching is keeping her up at night  For the past 6 months, will get better, then will come back    She has been using Tucks and PrepH  She has also tried barrier cream    She has a BM about every other day  No straining  No pain with BMs    SHe is not taking any fiber, stool softeners, magnesium    Most recent colonoscopy 11/2017    /78 (BP Location: Left arm, Patient Position: Sitting, Cuff Size: Adult)   Pulse 106   Temp 98.2 °F (36.8 °C)   Ht 170.2 cm (67\")   Wt 73 kg (161 lb)   LMP 11/26/2016   SpO2 98%   BMI 25.22 kg/m²   Body mass index is 25.22 kg/m².      PE:  Physical Exam   Constitutional: She appears well-developed. No distress.   HENT:   Head: Normocephalic and atraumatic.   Abdominal: Soft. She exhibits no distension.   Genitourinary:   Genitourinary Comments: Perianal exam: external hem - RP & LLat slightly enlarged.  Small RP and LP tags  Distal gluteal cleft skin excoriation with skin break  RIGOBERTO- good tone, no masses  Anoscopy performed:  Grade 2 x 3 internal hem, irritated   Musculoskeletal: Normal range of motion.   Neurological: She is alert.   Psychiatric: Thought content normal.         Assessment:   1. Internal hemorrhoids with complication    2. External hemorrhoids with complication    3. Perirectal skin irritation    4. Anal skin tag         Plan:    For the hemorrhoids, I recommended for patient to treat conservatively with MiraLAX, fiber, and the hemorrhoid cream.  I wrote patient a prescription for hydrocortisone 2.5% cream and gave patient instructions.    If no improvement, can consider in-office RBL.    For distal gluteal cleft skin excoriation, recommended barrier cream.      RTC 4-6 weeks      Scribed for Jovanni Bradshaw MD by Ariana" [facility] HARDY 12/5/2018  This patient was evaluated by me, recommendations made, documentation reviewed, edited, and revised by me, Jovanni Bradshaw MD

## 2019-01-04 RX ORDER — PAROXETINE 30 MG/1
30 TABLET, FILM COATED ORAL EVERY MORNING
Qty: 90 TABLET | Refills: 1 | Status: SHIPPED | OUTPATIENT
Start: 2019-01-04 | End: 2019-05-13

## 2019-03-04 ENCOUNTER — APPOINTMENT (OUTPATIENT)
Dept: WOMENS IMAGING | Facility: HOSPITAL | Age: 54
End: 2019-03-04

## 2019-03-04 PROCEDURE — 77067 SCR MAMMO BI INCL CAD: CPT | Performed by: RADIOLOGY

## 2019-03-14 RX ORDER — ZOLPIDEM TARTRATE 5 MG/1
TABLET ORAL
Qty: 30 TABLET | Refills: 2 | OUTPATIENT
Start: 2019-03-14 | End: 2019-05-13 | Stop reason: SDUPTHER

## 2019-05-13 ENCOUNTER — OFFICE VISIT (OUTPATIENT)
Dept: FAMILY MEDICINE CLINIC | Facility: CLINIC | Age: 54
End: 2019-05-13

## 2019-05-13 VITALS
HEIGHT: 67 IN | TEMPERATURE: 97.9 F | OXYGEN SATURATION: 100 % | RESPIRATION RATE: 18 BRPM | HEART RATE: 78 BPM | SYSTOLIC BLOOD PRESSURE: 116 MMHG | WEIGHT: 163 LBS | DIASTOLIC BLOOD PRESSURE: 68 MMHG | BODY MASS INDEX: 25.58 KG/M2

## 2019-05-13 DIAGNOSIS — F51.04 PSYCHOPHYSIOLOGICAL INSOMNIA: ICD-10-CM

## 2019-05-13 DIAGNOSIS — M25.561 CHRONIC PAIN OF BOTH KNEES: ICD-10-CM

## 2019-05-13 DIAGNOSIS — M25.562 CHRONIC PAIN OF BOTH KNEES: ICD-10-CM

## 2019-05-13 DIAGNOSIS — Z00.00 ROUTINE PHYSICAL EXAMINATION: Primary | ICD-10-CM

## 2019-05-13 DIAGNOSIS — G89.29 CHRONIC PAIN OF BOTH KNEES: ICD-10-CM

## 2019-05-13 DIAGNOSIS — G44.219 EPISODIC TENSION-TYPE HEADACHE, NOT INTRACTABLE: ICD-10-CM

## 2019-05-13 DIAGNOSIS — F33.41 RECURRENT MAJOR DEPRESSIVE DISORDER, IN PARTIAL REMISSION (HCC): ICD-10-CM

## 2019-05-13 PROBLEM — H60.312 ACUTE DIFFUSE OTITIS EXTERNA OF LEFT EAR: Status: RESOLVED | Noted: 2017-12-13 | Resolved: 2019-05-13

## 2019-05-13 PROBLEM — Z12.11 ENCOUNTER FOR SCREENING COLONOSCOPY: Status: RESOLVED | Noted: 2017-09-26 | Resolved: 2019-05-13

## 2019-05-13 PROCEDURE — 99214 OFFICE O/P EST MOD 30 MIN: CPT | Performed by: NURSE PRACTITIONER

## 2019-05-13 RX ORDER — BUTALBITAL, ASPIRIN, AND CAFFEINE 50; 325; 40 MG/1; MG/1; MG/1
1 CAPSULE ORAL EVERY 4 HOURS PRN
Qty: 30 CAPSULE | Refills: 0 | Status: SHIPPED | OUTPATIENT
Start: 2019-05-13 | End: 2020-01-23 | Stop reason: SDUPTHER

## 2019-05-13 RX ORDER — DULOXETIN HYDROCHLORIDE 60 MG/1
60 CAPSULE, DELAYED RELEASE ORAL DAILY
Qty: 30 CAPSULE | Refills: 1 | Status: SHIPPED | OUTPATIENT
Start: 2019-05-13 | End: 2019-07-19 | Stop reason: CLARIF

## 2019-05-13 RX ORDER — CYCLOSPORINE 0.5 MG/ML
1 EMULSION OPHTHALMIC EVERY 12 HOURS
COMMUNITY

## 2019-05-13 RX ORDER — ZOLPIDEM TARTRATE 5 MG/1
5 TABLET ORAL
Qty: 30 TABLET | Refills: 0 | OUTPATIENT
Start: 2019-05-13 | End: 2019-05-13

## 2019-05-13 RX ORDER — ZOLPIDEM TARTRATE 5 MG/1
5 TABLET ORAL
Qty: 30 TABLET | Refills: 0 | Status: SHIPPED | OUTPATIENT
Start: 2019-05-13 | End: 2019-09-19 | Stop reason: SDUPTHER

## 2019-05-13 NOTE — PROGRESS NOTES
Subjective   Sakina HURST is a 54 y.o. female.     Chief Complaint   Patient presents with   • Annual Exam   • Establish Care      HPI  Patient is new to me.  She is here to establish care, previous MD in this practice is now retired. She has a PMH of anxiety, depression and insomnia.    Insomnia:  Well controlled with ambien. She has almost weaned herself off of ambien.  She has been on it for over 15 years.  She weaned herself down from 10 mg to 5 mg daily and now she is only taking 5 mg at bedtime when she is traveling.  She travels for work about a week every 3 months.  The reason she feels like she cannot sleep as she has trouble turning her mind off.  Her GYN gave her prescription for something to help but it made her very groggy in the morning and activated her mind prior to going to bed.    Anxiety and depression: She has been on Paxil for this for over 20 years.  She feels for the most part it has controlled her anxiety and depression.  She is weaned herself off of it a couple times however she always goes back on it as her symptoms of depression especially return in about 6 weeks.  She does notice that she has some seasonal affective disorder and this time of year with the cold weather and the lack of sunshine is very difficult for her.  She is planning to try to move to [place] in the next several months.  Her children are all grown and she has been  for about a year.  Her anxiety and depression make her irritable and short tempered most of the time.  She denies any personal or family history of bipolar.    Tension type headaches: She gets about one terrible tension type headache per month.  Is always been well controlled with the Fioricet as she just needs to go to sleep.  She has tried other things such as NSAIDs which do not work.  She does not take the Fioricet more than 1-2 times per month and sometimes not that often.  She is requesting a refill.    Post menopausal.  She does had a Pap and  mammo-per GYN.    She recently had her screening colonoscopy and reports that was normal.    Her family history is significant for depression and anxiety.  Her mom is been on Paxil for many years.  Her mom had tried Zoloft and Lexapro without any results.    Social History     Tobacco Use   • Smoking status: Never Smoker   • Smokeless tobacco: Never Used   Substance Use Topics   • Alcohol use: No   • Drug use: No       The following portions of the patient's history were reviewed and updated as appropriate: allergies, current medications, past family history, past medical history, past social history, past surgical history and problem list.    Review of Systems   Constitutional: Negative for activity change, appetite change and unexpected weight change (Her weight has been creeping up despite no dietary changes).   HENT: Negative for ear pain and sore throat.    Respiratory: Negative for cough and shortness of breath.    Cardiovascular: Negative for chest pain and palpitations.   Gastrointestinal: Negative for abdominal pain, blood in stool, constipation, diarrhea, nausea and vomiting.   Genitourinary: Negative for dysuria, hematuria and vaginal bleeding.   Musculoskeletal: Negative for back pain and joint swelling.        Has some bilateral knee osteoarthritis.  She also notices it in her ankles at times.  Seems to be worse in the wintertime or colder months.  Has not noticed anything that improves it other than rest. Pain is noticeable going up and down stairs, standing all day.    She was seen by Ortho long time ago but has not had a follow-up and was told she has arthritis.   Neurological: Positive for headaches (Tension type headaches which occur bilateral temples,). Negative for weakness.   Psychiatric/Behavioral: Positive for dysphoric mood and sleep disturbance. The patient is nervous/anxious.        Objective   Blood pressure 116/68, pulse 78, temperature 97.9 °F (36.6 °C), resp. rate 18, height 170.2 cm  "(67\"), weight 73.9 kg (163 lb), last menstrual period 11/26/2016, SpO2 100 %, not currently breastfeeding.    Physical Exam   Constitutional: She is oriented to person, place, and time. She appears well-developed and well-nourished. No distress.   HENT:   Head: Normocephalic and atraumatic.   Right Ear: Tympanic membrane, external ear and ear canal normal.   Left Ear: Tympanic membrane, external ear and ear canal normal.   Mouth/Throat: Uvula is midline and oropharynx is clear and moist.   Eyes: Conjunctivae are normal. Right eye exhibits no discharge. Left eye exhibits no discharge.   Neck: Neck supple. No thyromegaly present.   Cardiovascular: Normal rate, regular rhythm and normal heart sounds.   Pulmonary/Chest: Effort normal and breath sounds normal.   Abdominal: Soft. Bowel sounds are normal. There is no tenderness.   Musculoskeletal: She exhibits no deformity.   Gait smooth and steady   Lymphadenopathy:     She has no cervical adenopathy.   Neurological: She is alert and oriented to person, place, and time.   Skin: Skin is warm and dry. She is not diaphoretic.   Psychiatric: She has a normal mood and affect.   Nursing note and vitals reviewed.      Assessment   Problem List Items Addressed This Visit        Other    Insomnia    Relevant Medications    zolpidem (AMBIEN) 5 MG tablet    Other Relevant Orders    CBC and Differential      Other Visit Diagnoses     Routine physical examination    -  Primary    Relevant Orders    Comprehensive metabolic panel    Lipid Panel With LDL/HDL Ratio    CBC and Differential    Chronic pain of both knees        Recurrent major depressive disorder, in partial remission (CMS/HCC)        Relevant Medications    DULoxetine (CYMBALTA) 60 MG capsule    zolpidem (AMBIEN) 5 MG tablet    Episodic tension-type headache, not intractable        Relevant Medications    butalbital-aspirin-caffeine (FIORINAL) -40 MG per capsule    DULoxetine (CYMBALTA) 60 MG capsule         "   Procedures PHQ-9 Total Score: 6   SUSAN 7 Total Score: 2               Impression and Plan:  Insomnia:  I am very happy to see that she is weaned herself almost completely off the Ambien.  I will give her a 30-day supply which should get her for the next couple months.  She will continue to try to wean off of the Ambien with travel.    Anxiety and depression: We will stop the Paxil and make a direct switch to Cymbalta at 60 mg.  If for some reason she cannot tolerate the direct switch she can wean down on the Paxil over 2 weeks which we discussed.    Headaches: I will give her a one-month supply of Fioricet but I told her it is not a drug I typically prescribe and will not give her more after today.  We discussed management of these types of headaches.    Knee pain:  We discussed mgmt of her knee pain including NSAIDs, PT, topicals.  I encouraged to keep moving as much as possible to keep joints mobile.  If pain increases she will let me know.      Patient appears to be up to date with health maintenance.     We discussed healthy diet and ways to incorporate activity and exercise into daily life.  Recommendations include trying to get at least 150 mins. of moderate intensity aerobic activity that provide enjoyment to lower risks of CVD disease.       As part of the patient's treatment plan, I am prescribing controlled substances. The patient has been made aware of appropriate use of such medications, including potential risk of somnolence, limited ability to drive and/or work safely, and the potential for dependence or overdose. It has also bee made clear that these medications are for use by this patient only, without concomitant use of alcohol or other substances unless prescribed.      Patient has completed prescribing agreement detailing terms of continued prescribing of controlled substances, including monitoring RADHA reports, urine drug screening, and pill counts if necessary. The patient is aware that  inappropriate use will results in cessation of prescribing such medications.     RADHA report has been reviewed and scanned into the patient's chart.     As the clinician, I personally reviewed the RADHA from today while the patient was in the office today.     History and physical exam exhibit continued safe and appropriate use of controlled substances.                  There are no preventive care reminders to display for this patient.         EMR Dragon/Transcription disclaimer:   Much of this encounter note is an electronic transcription/translation of spoken language to printed text. The electronic translation of spoken language may permit erroneous, or at times, nonsensical words or phrases to be inadvertently transcribed; Although I have reviewed the note for such errors, some may still exist.

## 2019-05-13 NOTE — PATIENT INSTRUCTIONS
Insomnia  Insomnia is a sleep disorder that makes it difficult to fall asleep or to stay asleep. Insomnia can cause tiredness (fatigue), low energy, difficulty concentrating, mood swings, and poor performance at work or school.  There are three different ways to classify insomnia:  · Difficulty falling asleep.  · Difficulty staying asleep.  · Waking up too early in the morning.    Any type of insomnia can be long-term (chronic) or short-term (acute). Both are common. Short-term insomnia usually lasts for three months or less. Chronic insomnia occurs at least three times a week for longer than three months.  What are the causes?  Insomnia may be caused by another condition, situation, or substance, such as:  · Anxiety.  · Certain medicines.  · Gastroesophageal reflux disease (GERD) or other gastrointestinal conditions.  · Asthma or other breathing conditions.  · Restless legs syndrome, sleep apnea, or other sleep disorders.  · Chronic pain.  · Menopause. This may include hot flashes.  · Stroke.  · Abuse of alcohol, tobacco, or illegal drugs.  · Depression.  · Caffeine.  · Neurological disorders, such as Alzheimer disease.  · An overactive thyroid (hyperthyroidism).    The cause of insomnia may not be known.  What increases the risk?  Risk factors for insomnia include:  · Gender. Women are more commonly affected than men.  · Age. Insomnia is more common as you get older.  · Stress. This may involve your professional or personal life.  · Income. Insomnia is more common in people with lower income.  · Lack of exercise.  · Irregular work schedule or night shifts.  · Traveling between different time zones.    What are the signs or symptoms?  If you have insomnia, trouble falling asleep or trouble staying asleep is the main symptom. This may lead to other symptoms, such as:  · Feeling fatigued.  · Feeling nervous about going to sleep.  · Not feeling rested in the morning.  · Having trouble concentrating.  · Feeling  irritable, anxious, or depressed.    How is this treated?  Treatment for insomnia depends on the cause. If your insomnia is caused by an underlying condition, treatment will focus on addressing the condition. Treatment may also include:  · Medicines to help you sleep.  · Counseling or therapy.  · Lifestyle adjustments.    Follow these instructions at home:  · Take medicines only as directed by your health care provider.  · Keep regular sleeping and waking hours. Avoid naps.  · Keep a sleep diary to help you and your health care provider figure out what could be causing your insomnia. Include:  ? When you sleep.  ? When you wake up during the night.  ? How well you sleep.  ? How rested you feel the next day.  ? Any side effects of medicines you are taking.  ? What you eat and drink.  · Make your bedroom a comfortable place where it is easy to fall asleep:  ? Put up shades or special blackout curtains to block light from outside.  ? Use a white noise machine to block noise.  ? Keep the temperature cool.  · Exercise regularly as directed by your health care provider. Avoid exercising right before bedtime.  · Use relaxation techniques to manage stress. Ask your health care provider to suggest some techniques that may work well for you. These may include:  ? Breathing exercises.  ? Routines to release muscle tension.  ? Visualizing peaceful scenes.  · Cut back on alcohol, caffeinated beverages, and cigarettes, especially close to bedtime. These can disrupt your sleep.  · Do not overeat or eat spicy foods right before bedtime. This can lead to digestive discomfort that can make it hard for you to sleep.  · Limit screen use before bedtime. This includes:  ? Watching TV.  ? Using your smartphone, tablet, and computer.  · Stick to a routine. This can help you fall asleep faster. Try to do a quiet activity, brush your teeth, and go to bed at the same time each night.  · Get out of bed if you are still awake after 15 minutes  of trying to sleep. Keep the lights down, but try reading or doing a quiet activity. When you feel sleepy, go back to bed.  · Make sure that you drive carefully. Avoid driving if you feel very sleepy.  · Keep all follow-up appointments as directed by your health care provider. This is important.  Contact a health care provider if:  · You are tired throughout the day or have trouble in your daily routine due to sleepiness.  · You continue to have sleep problems or your sleep problems get worse.  Get help right away if:  · You have serious thoughts about hurting yourself or someone else.  This information is not intended to replace advice given to you by your health care provider. Make sure you discuss any questions you have with your health care provider.  Document Released: 12/15/2001 Document Revised: 05/19/2017 Document Reviewed: 09/18/2015  [employer] Interactive Patient Education © 2018 [employer]

## 2019-05-14 LAB
ALBUMIN SERPL-MCNC: 4 G/DL (ref 3.5–5.2)
ALBUMIN/GLOB SERPL: 1.3 G/DL
ALP SERPL-CCNC: 89 U/L (ref 39–117)
ALT SERPL-CCNC: 17 U/L (ref 1–33)
AST SERPL-CCNC: 16 U/L (ref 1–32)
BASOPHILS # BLD AUTO: 0.06 10*3/MM3 (ref 0–0.2)
BASOPHILS NFR BLD AUTO: 0.7 % (ref 0–1.5)
BILIRUB SERPL-MCNC: 0.2 MG/DL (ref 0.2–1.2)
BUN SERPL-MCNC: 17 MG/DL (ref 6–20)
BUN/CREAT SERPL: 22.7 (ref 7–25)
CALCIUM SERPL-MCNC: 9.7 MG/DL (ref 8.6–10.5)
CHLORIDE SERPL-SCNC: 102 MMOL/L (ref 98–107)
CHOLEST SERPL-MCNC: 169 MG/DL (ref 0–200)
CO2 SERPL-SCNC: 28.7 MMOL/L (ref 22–29)
CREAT SERPL-MCNC: 0.75 MG/DL (ref 0.57–1)
EOSINOPHIL # BLD AUTO: 0.09 10*3/MM3 (ref 0–0.4)
EOSINOPHIL NFR BLD AUTO: 1 % (ref 0.3–6.2)
ERYTHROCYTE [DISTWIDTH] IN BLOOD BY AUTOMATED COUNT: 12.4 % (ref 12.3–15.4)
GLOBULIN SER CALC-MCNC: 3 GM/DL
GLUCOSE SERPL-MCNC: 87 MG/DL (ref 65–99)
HCT VFR BLD AUTO: 40.4 % (ref 34–46.6)
HDLC SERPL-MCNC: 57 MG/DL (ref 40–60)
HGB BLD-MCNC: 12.7 G/DL (ref 12–15.9)
IMM GRANULOCYTES # BLD AUTO: 0.03 10*3/MM3 (ref 0–0.05)
IMM GRANULOCYTES NFR BLD AUTO: 0.3 % (ref 0–0.5)
LDLC SERPL CALC-MCNC: 91 MG/DL (ref 0–100)
LDLC/HDLC SERPL: 1.6 {RATIO}
LYMPHOCYTES # BLD AUTO: 2.95 10*3/MM3 (ref 0.7–3.1)
LYMPHOCYTES NFR BLD AUTO: 32.2 % (ref 19.6–45.3)
Lab: NORMAL
MCH RBC QN AUTO: 29.1 PG (ref 26.6–33)
MCHC RBC AUTO-ENTMCNC: 31.4 G/DL (ref 31.5–35.7)
MCV RBC AUTO: 92.7 FL (ref 79–97)
MONOCYTES # BLD AUTO: 0.75 10*3/MM3 (ref 0.1–0.9)
MONOCYTES NFR BLD AUTO: 8.2 % (ref 5–12)
NEUTROPHILS # BLD AUTO: 5.29 10*3/MM3 (ref 1.7–7)
NEUTROPHILS NFR BLD AUTO: 57.6 % (ref 42.7–76)
NRBC BLD AUTO-RTO: 0 /100 WBC (ref 0–0.2)
PLATELET # BLD AUTO: 224 10*3/MM3 (ref 140–450)
POTASSIUM SERPL-SCNC: 3.9 MMOL/L (ref 3.5–5.2)
PROT SERPL-MCNC: 7 G/DL (ref 6–8.5)
RBC # BLD AUTO: 4.36 10*6/MM3 (ref 3.77–5.28)
SODIUM SERPL-SCNC: 138 MMOL/L (ref 136–145)
TRIGL SERPL-MCNC: 105 MG/DL (ref 0–150)
TSH SERPL DL<=0.005 MIU/L-ACNC: 4.47 MIU/ML (ref 0.27–4.2)
VLDLC SERPL CALC-MCNC: 21 MG/DL
WBC # BLD AUTO: 9.17 10*3/MM3 (ref 3.4–10.8)
WRITTEN AUTHORIZATION: NORMAL

## 2019-05-14 NOTE — PROGRESS NOTES
Added TSH/ Faxed to [facility]    Spoke in detail with Patient about results, patient expressed understanding and will follow up as agreed.     Any pending Labs and/or Diagnostic procedures required have been ordered for future release.    Emily RUSSELL

## 2019-05-15 DIAGNOSIS — R79.89 ABNORMAL THYROID BLOOD TEST: Primary | ICD-10-CM

## 2019-05-15 NOTE — PROGRESS NOTES
Spoke in detail with Patient about results, patient expressed understanding and will follow up as agreed.     Any pending Labs and/or Diagnostic procedures required have been ordered for future release.    Will come in around Aug. To do TSH again.     Emily RUSSELL

## 2019-05-17 ENCOUNTER — TELEPHONE (OUTPATIENT)
Dept: FAMILY MEDICINE CLINIC | Facility: CLINIC | Age: 54
End: 2019-05-17

## 2019-05-17 NOTE — TELEPHONE ENCOUNTER
[facility] called and said that since the Fiorinal is a class 3 that they could not run it under [facility] name. I spoke with Both providers to make sure that this was ok to put under Dr. Gandhi name. [facility] did say that this patient is only get this medication one more time. So we ok this to be under Dr Gandhi name for #30 no refill. I spoke with Shannon and she was changing the medication.

## 2019-07-19 RX ORDER — PAROXETINE 30 MG/1
30 TABLET, FILM COATED ORAL EVERY MORNING
Qty: 30 TABLET | Refills: 3 | Status: SHIPPED | OUTPATIENT
Start: 2019-07-19 | End: 2020-07-14 | Stop reason: SDUPTHER

## 2019-07-19 NOTE — TELEPHONE ENCOUNTER
Pt states the Cymbalta was not effective and she d/c'ed it. She went back to the Paxil 30 mg she states she already had at home, needs refill before weekend though cause she only had a few.

## 2019-09-19 DIAGNOSIS — F51.04 PSYCHOPHYSIOLOGICAL INSOMNIA: ICD-10-CM

## 2019-09-20 RX ORDER — ZOLPIDEM TARTRATE 5 MG/1
TABLET ORAL
Qty: 30 TABLET | Refills: 0 | Status: SHIPPED | OUTPATIENT
Start: 2019-09-20 | End: 2020-01-23 | Stop reason: SDUPTHER

## 2019-10-07 ENCOUNTER — TELEPHONE (OUTPATIENT)
Dept: SURGERY | Facility: CLINIC | Age: 54
End: 2019-10-07

## 2019-11-11 ENCOUNTER — RESULTS ENCOUNTER (OUTPATIENT)
Dept: FAMILY MEDICINE CLINIC | Facility: CLINIC | Age: 54
End: 2019-11-11

## 2019-11-11 DIAGNOSIS — R79.89 ABNORMAL THYROID BLOOD TEST: ICD-10-CM

## 2020-01-12 DIAGNOSIS — F51.04 PSYCHOPHYSIOLOGICAL INSOMNIA: ICD-10-CM

## 2020-01-13 RX ORDER — ZOLPIDEM TARTRATE 5 MG/1
TABLET ORAL
Qty: 30 TABLET | Refills: 0 | OUTPATIENT
Start: 2020-01-13

## 2020-01-23 ENCOUNTER — OFFICE VISIT (OUTPATIENT)
Dept: FAMILY MEDICINE CLINIC | Facility: CLINIC | Age: 55
End: 2020-01-23

## 2020-01-23 VITALS
RESPIRATION RATE: 20 BRPM | HEART RATE: 90 BPM | BODY MASS INDEX: 25.43 KG/M2 | WEIGHT: 162 LBS | SYSTOLIC BLOOD PRESSURE: 122 MMHG | OXYGEN SATURATION: 98 % | DIASTOLIC BLOOD PRESSURE: 70 MMHG | HEIGHT: 67 IN | TEMPERATURE: 99 F

## 2020-01-23 DIAGNOSIS — F51.04 PSYCHOPHYSIOLOGICAL INSOMNIA: Primary | ICD-10-CM

## 2020-01-23 DIAGNOSIS — G44.219 EPISODIC TENSION-TYPE HEADACHE, NOT INTRACTABLE: ICD-10-CM

## 2020-01-23 DIAGNOSIS — R79.89 ELEVATED TSH: ICD-10-CM

## 2020-01-23 PROCEDURE — 99213 OFFICE O/P EST LOW 20 MIN: CPT | Performed by: NURSE PRACTITIONER

## 2020-01-23 RX ORDER — BUTALBITAL, ASPIRIN, AND CAFFEINE 50; 325; 40 MG/1; MG/1; MG/1
1 CAPSULE ORAL EVERY 4 HOURS PRN
Qty: 30 CAPSULE | Refills: 0 | Status: SHIPPED | OUTPATIENT
Start: 2020-01-23 | End: 2021-05-10

## 2020-01-23 RX ORDER — ZOLPIDEM TARTRATE 5 MG/1
5 TABLET ORAL
Qty: 30 TABLET | Refills: 0 | Status: SHIPPED | OUTPATIENT
Start: 2020-01-23 | End: 2020-04-26

## 2020-01-23 NOTE — PROGRESS NOTES
"Lc HURST is a 54 y.o. female.     Chief Complaint   Patient presents with   • Insomnia      HPI  Here today for insomnia.  Has been doing well with ambien which she has weaned down on.  Has been now using it when she travels 1 week per month out of town if she cannot sleep.  Used a few in the last couple of months due to death of father 2 months ago.  Continues to work on sleep hygiene.   Still doing well on paxil.   Needs refill on fiorinal that she takes occasionally for migrainous type HA.   Did not get repeat TSH ordered after last visit as she has to go to different lab contracted by her insurance and isnt sure which one.    Denies other health concerns.     Social History     Tobacco Use   • Smoking status: Never Smoker   • Smokeless tobacco: Never Used   Substance Use Topics   • Alcohol use: No   • Drug use: No       The following portions of the patient's history were reviewed and updated as appropriate: allergies, current medications, past family history, past medical history, past social history, past surgical history and problem list.    Review of Systems   Constitutional: Negative for chills, fatigue, fever and unexpected weight change.   Respiratory: Negative for cough and shortness of breath.    Cardiovascular: Negative for chest pain and palpitations.   Gastrointestinal: Negative for abdominal pain, blood in stool, constipation, diarrhea, nausea and vomiting.   Genitourinary: Negative for dysuria, hematuria and urgency.   Musculoskeletal: Negative for arthralgias, joint swelling and myalgias.   Neurological: Positive for headaches. Negative for weakness.   Psychiatric/Behavioral: Positive for sleep disturbance. Negative for dysphoric mood. The patient is not nervous/anxious.        Objective   Blood pressure 122/70, pulse 90, temperature 99 °F (37.2 °C), temperature source Oral, resp. rate 20, height 170.2 cm (67\"), weight 73.5 kg (162 lb), last menstrual period 11/26/2016, SpO2 98 %, " not currently breastfeeding.  Body mass index is 25.37 kg/m².    Physical Exam   Constitutional: She is oriented to person, place, and time. She appears well-developed and well-nourished. No distress.   HENT:   Head: Normocephalic and atraumatic.   Mouth/Throat: Oropharynx is clear and moist.   Eyes: Conjunctivae are normal. Right eye exhibits no discharge. Left eye exhibits no discharge.   Cardiovascular: Normal rate and regular rhythm.   Pulmonary/Chest: Effort normal and breath sounds normal.   Abdominal: Soft. Bowel sounds are normal. There is no tenderness.   Musculoskeletal: She exhibits no deformity.   Gait smooth and steady   Neurological: She is alert and oriented to person, place, and time.   Skin: Skin is warm and dry. She is not diaphoretic.   Psychiatric: She has a normal mood and affect.   Nursing note and vitals reviewed.      Assessment   Problem List Items Addressed This Visit        Other    Insomnia    Relevant Medications    zolpidem (AMBIEN) 5 MG tablet      Other Visit Diagnoses     Elevated TSH    -  Primary    Relevant Orders    TSH Rfx On Abnormal To Free T4    Episodic tension-type headache, not intractable        Relevant Medications    butalbital-aspirin-caffeine (FIORINAL) -40 MG per capsule           Procedures           Impression and Plan:  meds refilled.  Risks of ambien and need to continue to wean off discussed. She has not had a refill since this past September.    Migraines controlled with fiorinal.  This is not med I usually prescribe but will give one refill to use sparingly.  If migraines worsen or change she needs f/u.   Last TSH was mildly elevated-will give lab slip for her to take to lab.  She will need to make sure to let us know when she gets them done so that we can make sure we get them.      She has been prescribed hydrocodone from podiatry which she did not inform us of.  If it happens again, we will not continue to prescribe controlled substances.     As part  of the patient's treatment plan, I am prescribing controlled substances. The patient has been made aware of appropriate use of such medications, including potential risk of somnolence, limited ability to drive and/or work safely, and the potential for dependence or overdose. It has also bee made clear that these medications are for use by this patient only, without concomitant use of alcohol or other substances unless prescribed.      Patient has completed prescribing agreement detailing terms of continued prescribing of controlled substances, including monitoring RADHA reports, urine drug screening, and pill counts if necessary. The patient is aware that inappropriate use will results in cessation of prescribing such medications.     RADHA report has been reviewed and scanned into the patient's chart.     As the clinician, I personally reviewed the RADHA from today while the patient was in the office today.     History and physical exam exhibit continued safe and appropriate use of controlled substances.                There are no preventive care reminders to display for this patient.           EMR Dragon/Transcription disclaimer:   Much of this encounter note is an electronic transcription/translation of spoken language to printed text. The electronic translation of spoken language may permit erroneous, or at times, nonsensical words or phrases to be inadvertently transcribed; Although I have reviewed the note for such errors, some may still exist.

## 2020-02-27 ENCOUNTER — TELEPHONE (OUTPATIENT)
Dept: FAMILY MEDICINE CLINIC | Facility: CLINIC | Age: 55
End: 2020-02-27

## 2020-02-27 NOTE — TELEPHONE ENCOUNTER
pharmacy called and stated that they received a prescription order for  butalbital-aspirin-caffeine (FIORINAL) -40 MG per capsule. They stated that this is a level 3 script and is unable to be filled by [facility].     pharmacy states that if [facility] can prescribe something else or have another provider send in a new request.     Call back   [phone]

## 2020-04-26 DIAGNOSIS — F51.04 PSYCHOPHYSIOLOGICAL INSOMNIA: ICD-10-CM

## 2020-04-26 RX ORDER — ZOLPIDEM TARTRATE 5 MG/1
TABLET ORAL
Qty: 30 TABLET | Refills: 0 | Status: SHIPPED | OUTPATIENT
Start: 2020-04-26 | End: 2020-07-14 | Stop reason: SDUPTHER

## 2020-07-08 DIAGNOSIS — F51.04 PSYCHOPHYSIOLOGICAL INSOMNIA: ICD-10-CM

## 2020-07-13 RX ORDER — ZOLPIDEM TARTRATE 5 MG/1
TABLET ORAL
Qty: 30 TABLET | Refills: 0 | OUTPATIENT
Start: 2020-07-13

## 2020-07-15 LAB — TSH SERPL DL<=0.005 MIU/L-ACNC: 3.81 UIU/ML (ref 0.27–4.2)

## 2020-07-17 DIAGNOSIS — Z00.00 ROUTINE GENERAL MEDICAL EXAMINATION AT A HEALTH CARE FACILITY: Primary | ICD-10-CM

## 2020-08-19 ENCOUNTER — APPOINTMENT (OUTPATIENT)
Dept: WOMENS IMAGING | Facility: HOSPITAL | Age: 55
End: 2020-08-19

## 2020-08-19 PROCEDURE — 77063 BREAST TOMOSYNTHESIS BI: CPT | Performed by: RADIOLOGY

## 2020-08-19 PROCEDURE — 77067 SCR MAMMO BI INCL CAD: CPT | Performed by: RADIOLOGY

## 2020-10-15 ENCOUNTER — RESULTS ENCOUNTER (OUTPATIENT)
Dept: FAMILY MEDICINE CLINIC | Facility: CLINIC | Age: 55
End: 2020-10-15

## 2020-10-15 DIAGNOSIS — Z00.00 ROUTINE GENERAL MEDICAL EXAMINATION AT A HEALTH CARE FACILITY: ICD-10-CM

## 2020-12-02 DIAGNOSIS — F51.04 PSYCHOPHYSIOLOGICAL INSOMNIA: ICD-10-CM

## 2020-12-04 RX ORDER — ZOLPIDEM TARTRATE 5 MG/1
TABLET ORAL
Qty: 30 TABLET | Refills: 0 | Status: SHIPPED | OUTPATIENT
Start: 2020-12-04 | End: 2021-01-28 | Stop reason: SDUPTHER

## 2021-01-28 ENCOUNTER — OFFICE VISIT (OUTPATIENT)
Dept: FAMILY MEDICINE CLINIC | Facility: CLINIC | Age: 56
End: 2021-01-28

## 2021-01-28 VITALS
HEART RATE: 85 BPM | HEIGHT: 67 IN | OXYGEN SATURATION: 98 % | RESPIRATION RATE: 16 BRPM | TEMPERATURE: 97.8 F | WEIGHT: 166.4 LBS | DIASTOLIC BLOOD PRESSURE: 65 MMHG | BODY MASS INDEX: 26.12 KG/M2 | SYSTOLIC BLOOD PRESSURE: 98 MMHG

## 2021-01-28 DIAGNOSIS — K21.9 GASTROESOPHAGEAL REFLUX DISEASE WITHOUT ESOPHAGITIS: Primary | ICD-10-CM

## 2021-01-28 DIAGNOSIS — I49.8 PERIODIC HEART FLUTTER: ICD-10-CM

## 2021-01-28 DIAGNOSIS — F51.04 PSYCHOPHYSIOLOGICAL INSOMNIA: ICD-10-CM

## 2021-01-28 DIAGNOSIS — R42 EPISODIC LIGHTHEADEDNESS: ICD-10-CM

## 2021-01-28 PROCEDURE — 93000 ELECTROCARDIOGRAM COMPLETE: CPT | Performed by: NURSE PRACTITIONER

## 2021-01-28 PROCEDURE — 99214 OFFICE O/P EST MOD 30 MIN: CPT | Performed by: NURSE PRACTITIONER

## 2021-01-28 RX ORDER — ZOLPIDEM TARTRATE 5 MG/1
5 TABLET ORAL NIGHTLY PRN
Qty: 30 TABLET | Refills: 2 | Status: SHIPPED | OUTPATIENT
Start: 2021-01-28 | End: 2021-07-13 | Stop reason: SDUPTHER

## 2021-01-28 RX ORDER — ESOMEPRAZOLE MAGNESIUM 40 MG/1
40 CAPSULE, DELAYED RELEASE ORAL NIGHTLY
Qty: 30 CAPSULE | Refills: 1 | Status: SHIPPED | OUTPATIENT
Start: 2021-01-28 | End: 2021-03-29 | Stop reason: SDUPTHER

## 2021-02-24 ENCOUNTER — HOSPITAL ENCOUNTER (OUTPATIENT)
Dept: CARDIOLOGY | Facility: HOSPITAL | Age: 56
Discharge: HOME | End: 2021-02-24
Admitting: NURSE PRACTITIONER

## 2021-02-24 VITALS
HEART RATE: 72 BPM | BODY MASS INDEX: 26.06 KG/M2 | DIASTOLIC BLOOD PRESSURE: 66 MMHG | HEIGHT: 67 IN | WEIGHT: 166 LBS | SYSTOLIC BLOOD PRESSURE: 98 MMHG

## 2021-02-24 DIAGNOSIS — I49.8 PERIODIC HEART FLUTTER: ICD-10-CM

## 2021-02-24 DIAGNOSIS — R42 EPISODIC LIGHTHEADEDNESS: ICD-10-CM

## 2021-02-24 LAB
AORTIC ARCH: 2 CM
ASCENDING AORTA: 3.2 CM
BH CV ECHO MEAS - ACS: 1.9 CM
BH CV ECHO MEAS - AO MAX PG (FULL): 4.3 MMHG
BH CV ECHO MEAS - AO MAX PG: 6.3 MMHG
BH CV ECHO MEAS - AO MEAN PG (FULL): 2 MMHG
BH CV ECHO MEAS - AO MEAN PG: 3 MMHG
BH CV ECHO MEAS - AO ROOT AREA (BSA CORRECTED): 1.6
BH CV ECHO MEAS - AO ROOT AREA: 6.6 CM^2
BH CV ECHO MEAS - AO ROOT DIAM: 2.9 CM
BH CV ECHO MEAS - AO V2 MAX: 125 CM/SEC
BH CV ECHO MEAS - AO V2 MEAN: 75.9 CM/SEC
BH CV ECHO MEAS - AO V2 VTI: 29.6 CM
BH CV ECHO MEAS - ASC AORTA: 3.2 CM
BH CV ECHO MEAS - AVA(I,A): 1.5 CM^2
BH CV ECHO MEAS - AVA(I,D): 1.5 CM^2
BH CV ECHO MEAS - AVA(V,A): 1.6 CM^2
BH CV ECHO MEAS - AVA(V,D): 1.6 CM^2
BH CV ECHO MEAS - BSA(HAYCOCK): 1.9 M^2
BH CV ECHO MEAS - BSA: 1.9 M^2
BH CV ECHO MEAS - BZI_BMI: 26 KILOGRAMS/M^2
BH CV ECHO MEAS - BZI_METRIC_HEIGHT: 170.2 CM
BH CV ECHO MEAS - BZI_METRIC_WEIGHT: 75.3 KG
BH CV ECHO MEAS - EDV(MOD-SP2): 49 ML
BH CV ECHO MEAS - EDV(MOD-SP4): 57 ML
BH CV ECHO MEAS - EDV(TEICH): 112.8 ML
BH CV ECHO MEAS - EF(CUBED): 72.1 %
BH CV ECHO MEAS - EF(MOD-BP): 57.5 %
BH CV ECHO MEAS - EF(MOD-SP2): 61.2 %
BH CV ECHO MEAS - EF(MOD-SP4): 56.1 %
BH CV ECHO MEAS - EF(TEICH): 63.7 %
BH CV ECHO MEAS - ESV(MOD-SP2): 19 ML
BH CV ECHO MEAS - ESV(MOD-SP4): 25 ML
BH CV ECHO MEAS - ESV(TEICH): 41 ML
BH CV ECHO MEAS - FS: 34.7 %
BH CV ECHO MEAS - IVS/LVPW: 1
BH CV ECHO MEAS - IVSD: 1 CM
BH CV ECHO MEAS - LAT PEAK E' VEL: 13.1 CM/SEC
BH CV ECHO MEAS - LV DIASTOLIC VOL/BSA (35-75): 30.5 ML/M^2
BH CV ECHO MEAS - LV MASS(C)D: 176 GRAMS
BH CV ECHO MEAS - LV MASS(C)DI: 94.2 GRAMS/M^2
BH CV ECHO MEAS - LV MAX PG: 1.9 MMHG
BH CV ECHO MEAS - LV MEAN PG: 1 MMHG
BH CV ECHO MEAS - LV SYSTOLIC VOL/BSA (12-30): 13.4 ML/M^2
BH CV ECHO MEAS - LV V1 MAX: 69.8 CM/SEC
BH CV ECHO MEAS - LV V1 MEAN: 42.3 CM/SEC
BH CV ECHO MEAS - LV V1 VTI: 15.7 CM
BH CV ECHO MEAS - LVIDD: 4.9 CM
BH CV ECHO MEAS - LVIDS: 3.2 CM
BH CV ECHO MEAS - LVLD AP2: 6.8 CM
BH CV ECHO MEAS - LVLD AP4: 7.2 CM
BH CV ECHO MEAS - LVLS AP2: 5.7 CM
BH CV ECHO MEAS - LVLS AP4: 6.4 CM
BH CV ECHO MEAS - LVOT AREA (M): 2.8 CM^2
BH CV ECHO MEAS - LVOT AREA: 2.8 CM^2
BH CV ECHO MEAS - LVOT DIAM: 1.9 CM
BH CV ECHO MEAS - LVPWD: 1 CM
BH CV ECHO MEAS - MED PEAK E' VEL: 10.1 CM/SEC
BH CV ECHO MEAS - MR MAX PG: 48.2 MMHG
BH CV ECHO MEAS - MR MAX VEL: 347 CM/SEC
BH CV ECHO MEAS - MV A DUR: 0.1 SEC
BH CV ECHO MEAS - MV A MAX VEL: 54 CM/SEC
BH CV ECHO MEAS - MV DEC SLOPE: 387 CM/SEC^2
BH CV ECHO MEAS - MV DEC TIME: 0.18 SEC
BH CV ECHO MEAS - MV E MAX VEL: 69.4 CM/SEC
BH CV ECHO MEAS - MV E/A: 1.3
BH CV ECHO MEAS - MV MAX PG: 2.8 MMHG
BH CV ECHO MEAS - MV MEAN PG: 1 MMHG
BH CV ECHO MEAS - MV P1/2T MAX VEL: 69.4 CM/SEC
BH CV ECHO MEAS - MV P1/2T: 52.5 MSEC
BH CV ECHO MEAS - MV V2 MAX: 84 CM/SEC
BH CV ECHO MEAS - MV V2 MEAN: 51.5 CM/SEC
BH CV ECHO MEAS - MV V2 VTI: 22.4 CM
BH CV ECHO MEAS - MVA P1/2T LCG: 3.2 CM^2
BH CV ECHO MEAS - MVA(P1/2T): 4.2 CM^2
BH CV ECHO MEAS - MVA(VTI): 2 CM^2
BH CV ECHO MEAS - PA MAX PG (FULL): 1 MMHG
BH CV ECHO MEAS - PA MAX PG: 2.5 MMHG
BH CV ECHO MEAS - PA V2 MAX: 78.7 CM/SEC
BH CV ECHO MEAS - PULM A REVS DUR: 0.13 SEC
BH CV ECHO MEAS - PULM A REVS VEL: 32.1 CM/SEC
BH CV ECHO MEAS - PULM DIAS VEL: 30.8 CM/SEC
BH CV ECHO MEAS - PULM S/D: 1.5
BH CV ECHO MEAS - PULM SYS VEL: 45.4 CM/SEC
BH CV ECHO MEAS - PVA(V,A): 3.2 CM^2
BH CV ECHO MEAS - PVA(V,D): 3.2 CM^2
BH CV ECHO MEAS - QP/QS: 1.4
BH CV ECHO MEAS - RV MAX PG: 1.4 MMHG
BH CV ECHO MEAS - RV MEAN PG: 1 MMHG
BH CV ECHO MEAS - RV V1 MAX: 60.1 CM/SEC
BH CV ECHO MEAS - RV V1 MEAN: 38.5 CM/SEC
BH CV ECHO MEAS - RV V1 VTI: 14.6 CM
BH CV ECHO MEAS - RVOT AREA: 4.2 CM^2
BH CV ECHO MEAS - RVOT DIAM: 2.3 CM
BH CV ECHO MEAS - SI(AO): 104.7 ML/M^2
BH CV ECHO MEAS - SI(CUBED): 45.4 ML/M^2
BH CV ECHO MEAS - SI(LVOT): 23.8 ML/M^2
BH CV ECHO MEAS - SI(MOD-SP2): 16.1 ML/M^2
BH CV ECHO MEAS - SI(MOD-SP4): 17.1 ML/M^2
BH CV ECHO MEAS - SI(TEICH): 38.5 ML/M^2
BH CV ECHO MEAS - SUP REN AO DIAM: 1.7 CM
BH CV ECHO MEAS - SV(AO): 195.5 ML
BH CV ECHO MEAS - SV(CUBED): 84.9 ML
BH CV ECHO MEAS - SV(LVOT): 44.5 ML
BH CV ECHO MEAS - SV(MOD-SP2): 30 ML
BH CV ECHO MEAS - SV(MOD-SP4): 32 ML
BH CV ECHO MEAS - SV(RVOT): 60.7 ML
BH CV ECHO MEAS - SV(TEICH): 71.9 ML
BH CV ECHO MEAS - TAPSE (>1.6): 3.4 CM
BH CV ECHO MEASUREMENTS AVERAGE E/E' RATIO: 5.98
BH CV XLRA - RV BASE: 2.9 CM
BH CV XLRA - RV LENGTH: 6.2 CM
BH CV XLRA - RV MID: 2.4 CM
BH CV XLRA - TDI S': 12.8 CM/SEC
LEFT ATRIUM VOLUME INDEX: 18 ML/M2
LV EF 2D ECHO EST: 58 %
MAXIMAL PREDICTED HEART RATE: 166 BPM
SINUS: 3.4 CM
STJ: 2.9 CM
STRESS TARGET HR: 141 BPM

## 2021-02-24 PROCEDURE — 93306 TTE W/DOPPLER COMPLETE: CPT | Performed by: INTERNAL MEDICINE

## 2021-02-24 PROCEDURE — 93306 TTE W/DOPPLER COMPLETE: CPT

## 2021-02-26 ENCOUNTER — TELEPHONE (OUTPATIENT)
Dept: FAMILY MEDICINE CLINIC | Facility: CLINIC | Age: 56
End: 2021-02-26

## 2021-02-26 NOTE — TELEPHONE ENCOUNTER
Caller: Sakina HURST    Relationship: Self    Best call back number: [phone]    Caller requesting test results: patient     What test was performed: echocardiogram     When was the test performed: 2/24/2021    Where was the test performed: [facility]     Additional notes: n/a

## 2021-02-26 NOTE — TELEPHONE ENCOUNTER
Patient aware her echocardiogram was normal. She wants to know if she need to do anything in regards to the flutters

## 2021-03-02 DIAGNOSIS — I49.8 PERIODIC HEART FLUTTER: Primary | ICD-10-CM

## 2021-03-02 DIAGNOSIS — R42 EPISODIC LIGHTHEADEDNESS: ICD-10-CM

## 2021-03-17 ENCOUNTER — TELEPHONE (OUTPATIENT)
Dept: FAMILY MEDICINE CLINIC | Facility: CLINIC | Age: 56
End: 2021-03-17

## 2021-03-29 DIAGNOSIS — K21.9 GASTROESOPHAGEAL REFLUX DISEASE WITHOUT ESOPHAGITIS: Primary | ICD-10-CM

## 2021-03-30 RX ORDER — ESOMEPRAZOLE MAGNESIUM 40 MG/1
40 CAPSULE, DELAYED RELEASE ORAL NIGHTLY
Qty: 90 CAPSULE | Refills: 0 | Status: SHIPPED | OUTPATIENT
Start: 2021-03-30 | End: 2021-07-13 | Stop reason: SDUPTHER

## 2021-05-04 DIAGNOSIS — F51.04 PSYCHOPHYSIOLOGICAL INSOMNIA: ICD-10-CM

## 2021-05-04 RX ORDER — PAROXETINE 30 MG/1
30 TABLET, FILM COATED ORAL EVERY MORNING
Qty: 30 TABLET | Refills: 3 | Status: SHIPPED | OUTPATIENT
Start: 2021-05-04 | End: 2021-09-07

## 2021-05-04 NOTE — TELEPHONE ENCOUNTER
Caller: Sakina HURST    Relationship: Self    Best call back number: [phone]    Medication needed:   Requested Prescriptions     Pending Prescriptions Disp Refills   • PARoxetine (Paxil) 30 MG tablet 30 tablet 3     Sig: Take 1 tablet by mouth Every Morning.       When do you need the refill by: 5/5/2021    What additional details did the patient provide when requesting the medication: ENOUGH FOR TODAY    Does the patient have less than a 3 day supply:  [x] Yes  [] No    What is the patient's preferred pharmacy: [facility] [address] [facility] [phone] [facility] [phone] [facility]

## 2021-05-10 ENCOUNTER — OFFICE VISIT (OUTPATIENT)
Dept: CARDIOLOGY | Facility: CLINIC | Age: 56
End: 2021-05-10

## 2021-05-10 VITALS
OXYGEN SATURATION: 99 % | HEART RATE: 80 BPM | HEIGHT: 67 IN | WEIGHT: 164 LBS | BODY MASS INDEX: 25.74 KG/M2 | DIASTOLIC BLOOD PRESSURE: 78 MMHG | RESPIRATION RATE: 16 BRPM | SYSTOLIC BLOOD PRESSURE: 112 MMHG

## 2021-05-10 DIAGNOSIS — R00.2 PALPITATIONS: Primary | ICD-10-CM

## 2021-05-10 PROCEDURE — 99204 OFFICE O/P NEW MOD 45 MIN: CPT | Performed by: INTERNAL MEDICINE

## 2021-05-10 PROCEDURE — 93000 ELECTROCARDIOGRAM COMPLETE: CPT | Performed by: INTERNAL MEDICINE

## 2021-05-10 NOTE — PROGRESS NOTES
[facility] New Patient Office Note     Encounter Date:05/10/21  Patient:Sakina HURST  :1965  MRN:[id number]    Referring Provider: RANDA Beyer    Consulted for: Evaluation of palpitations    Chief Complaint:   Chief Complaint   Patient presents with   • Palpitations     History of Presenting Illness:      Ms. Hurst is a 56 y.o. woman with past medical history notable for depression/anxiety as well as a history of rheumatic fever as a child on suppressive antibiotics who presents to our office for initial evaluation regarding symptoms of palpitations.  She describes having palpitations for about 10 years but over the last couple of months have been getting more frequent.  She describes them as lasting for seconds.  As of late they recur almost once a week.  They are random.  There is no alleviating or exacerbating factors.  She does have occasional episodes of feeling a little presyncopal but has not had any overt syncope.  She denies any other symptoms such as diaphoresis or chest pain.  Overall she has been fairly healthy.  Her primary care physician did order an echocardiogram back in February given her history of rheumatic heart disease which was essentially normal.  Her most recent lab work also was reportedly normal but will need to work on obtaining these records.      Review of Systems:  Review of Systems   Constitutional: Negative.   HENT: Negative.    Eyes: Negative.    Cardiovascular: Positive for palpitations.   Respiratory: Negative.    Endocrine: Negative.    Hematologic/Lymphatic: Negative.    Skin: Negative.    Musculoskeletal: Negative.    Gastrointestinal: Negative.    Genitourinary: Negative.    Neurological: Negative.    Psychiatric/Behavioral: Negative.    Allergic/Immunologic: Negative.        Current Outpatient Medications on File Prior to Visit   Medication Sig Dispense Refill   • cycloSPORINE (RESTASIS) 0.05 % ophthalmic emulsion Administer 1 drop to both eyes  Every 12 (Twelve) Hours.     • esomeprazole (nexIUM) 40 MG capsule Take 1 capsule by mouth Every Night. 90 capsule 0   • PARoxetine (Paxil) 30 MG tablet Take 1 tablet by mouth Every Morning. 30 tablet 3   • zolpidem (AMBIEN) 5 MG tablet Take 1 tablet by mouth At Night As Needed for Sleep. 30 tablet 2   • butalbital-aspirin-caffeine (FIORINAL) -40 MG per capsule Take 1 capsule by mouth Every 4 (Four) Hours As Needed for Headache. 30 capsule 0     No current facility-administered medications on file prior to visit.       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 GHASSAN COLE   • Left ovarian cyst 2006    FOLLICLE CYST   • Lesion of left plantar nerve    • Menopause    • Onychomycosis 2014   • Primary insomnia    • Syncope 2018    SEEN AT [facility]   • Thrombosed hemorrhoids 2017    SAW DR. FAUZIA GIRON       Past Surgical History:   Procedure Laterality Date   • BREAST SURGERY Bilateral 2005    AUGMENTATION MAMMOPLASTY, DR. TONY FERGUSON AT [facility]   •  SECTION N/A    • CHOLECYSTECTOMY N/A     DONE IN [place]   • COLONOSCOPY N/A 2017    ENTIRE COLON WNL, RESCOPE IN 5 YRS, DR. FAUZIA GIRON AT [facility]   • COLPOSCOPY W/ BIOPSY / CURETTAGE N/A 2007    BX OF PERIANAL LESION, PATH: SEVERE DYSPLASIA, CARCINOMA IN SITU, DR.REBECCA MICHELLE AT [facility]   • ENDOMETRIAL ABLATION N/A 2006    WITH EUA, LYSIS OF ADHESIONS, AND ASPIRATION OF LEFT OVARIAN FOLLICLE CYST, DR. TONY MICHELLE AT [facility]   • FOOT SURGERY  2020    plate and pen removal    • NEUROMA SURGERY Right 02/10/2016    right third intermetatarsal  "space; [facility]; Lee James DPM       Social History     Socioeconomic History   • Marital status:      Spouse name: Not on file   • Number of children: Not on file   • Years of education: Not on file   • Highest education level: Not on file   Tobacco Use   • Smoking status: Never Smoker   • Smokeless tobacco: Never Used   Vaping Use   • Vaping Use: Never used   Substance and Sexual Activity   • Alcohol use: No   • Drug use: No   • Sexual activity: Defer       Family History   Problem Relation Age of Onset   • Heart disease Mother    • Cancer Mother         colon   • Hypertension Mother    • Depression Mother    • Colon polyps Mother    • Colon cancer Mother    • Mental illness Mother    • Heart disease Father    • Depression Father    • Mental illness Father    • Cancer Father    • No Known Problems Daughter    • No Known Problems Son        The following portions of the patient's history were reviewed and updated as appropriate: allergies, current medications, past family history, past medical history, past social history, past surgical history and problem list.       Objective:       Vitals:    05/10/21 1347   BP: 112/78   BP Location: Right arm   Patient Position: Sitting   Cuff Size: Adult   Pulse: 80   Resp: 16   SpO2: 99%   Weight: 74.4 kg (164 lb)   Height: 170.2 cm (67\")     Body mass index is 25.69 kg/m².     Physical Exam:  Constitutional: Well appearing, well developed, no acute distress   HENT: Oropharynx clear and membrane moist  Eyes: Normal conjunctiva, no sclera icterus.  Neck: Supple, no carotid bruit bilaterally.  Cardiovascular: Regular rate and rhythm, No Murmur, No bilateral lower extremity edema.  Pulmonary: Normal respiratory effort, normal lung sounds, no wheezing.  Abdominal: Soft, nontender, no hepatosplenomegaly, liver is non-pulsatile.  Neurological: Alert and orient x 3.   Skin: Warm, dry, no ecchymosis, no rash.  Psych: Appropriate mood and affect. Normal judgment and " insight.      Lab Results   Component Value Date     05/13/2019     02/07/2018    K 3.9 05/13/2019    K 3.5 02/07/2018     05/13/2019     02/07/2018    CO2 28.7 05/13/2019    CO2 26 02/07/2018    BUN 17 05/13/2019    BUN 17 02/07/2018    CREATININE 0.75 05/13/2019    CREATININE 0.9 02/07/2018    EGFRIFNONA 81 05/13/2019    EGFRIFNONA 81 12/14/2017    EGFRIFAFRI 98 05/13/2019    EGFRIFAFRI 99 12/14/2017    GLUCOSE 88 02/04/2016    CALCIUM 9.7 05/13/2019    CALCIUM 9.1 02/07/2018    PROTENTOTREF 7.0 05/13/2019    PROTENTOTREF 7.3 12/14/2017    ALBUMIN 4.00 05/13/2019    ALBUMIN 4.1 02/07/2018    BILITOT 0.2 05/13/2019    BILITOT 0.4 02/07/2018    AST 16 05/13/2019    AST 34 02/07/2018    ALT 17 05/13/2019    ALT 39 02/07/2018     Lab Results   Component Value Date    WBC 9.17 05/13/2019    WBC 7.20 02/07/2018    HGB 12.7 05/13/2019    HGB 13.6 02/07/2018    HCT 40.4 05/13/2019    HCT 43.0 02/07/2018    MCV 92.7 05/13/2019    MCV 94.5 02/07/2018     05/13/2019     02/07/2018     Lab Results   Component Value Date    TRIG 105 05/13/2019    HDL 57 05/13/2019    LDL 91 05/13/2019     No results found for: PROBNP, BNP  Lab Results   Component Value Date    TROPONINI <0.012 02/07/2018     Lab Results   Component Value Date    TSH 3.810 07/15/2020    TSH 4.470 (H) 05/13/2019         ECG 12 Lead    Date/Time: 5/10/2021 2:13 PM  Performed by: Peter Muir MD  Authorized by: Peter Muir MD   Previous ECG: no previous ECG available  Rhythm: sinus rhythm  Other findings: non-specific ST-T wave changes    Clinical impression: non-specific ECG        Echocardiogram 2/24/2021 with images reviewed by myself:  · Normal ejection fraction with a estimated EF of 58% with normal systolic function.  Normal diastolic function  · No significant valvular abnormalities noted          Assessment:          Diagnosis Plan   1. Palpitations  Cardiac Event Monitor    ECG 12 Lead          Plan:        Ms. Hurst is a 56 y.o. woman with past medical history notable for depression/anxiety as well as a history of rheumatic fever as a child on suppressive antibiotics who presents to our office for initial evaluation regarding symptoms of palpitations.  Her symptoms sound like possible APCs or PVCs but would like to get an event monitor to better define her heart rhythm and exclude other possible heart rhythms such as paroxysmal supraventricular tachycardia or atrial fibrillation.  Fortunately her echocardiogram demonstrates a structurally normal heart.  She reportedly had normal labs by her primary care but will work on obtaining those as they are not scanned into our system.  We will base further treatment options and testing upon her event monitor results but potentially we could also continue to monitor her symptoms symptomatically.  We will plan on seeing her back in 2 months which should allow us to get her monitor results back.  She is planning on going to [place] next week and will work on getting her event monitor when she returns from [place].      Palpitations:  · Normal echocardiogram 2/2021, with no residual effects noted from her history of rheumatic fever  · We will work on getting lab reports from primary care physician  · Follow-up on event monitor results    Follow-up:  2 months      Thank you for allowing me to participate in the care of Sakina HURST. Feel free to contact me directly with any further questions or concerns.    Peter Muir MD  [facility]  05/10/21  14:14 EDT

## 2021-06-26 ENCOUNTER — TELEPHONE (OUTPATIENT)
Dept: CARDIOLOGY | Facility: CLINIC | Age: 56
End: 2021-06-26

## 2021-06-26 NOTE — TELEPHONE ENCOUNTER
RANDA Escudero received a call this weekend from the [employer] company. They reported the patient had a nonsustained run of VT while on her monitor. Official report has not been read. Attempted to call the pt twice today to ask about symptoms but the call was disconnected with no option to leave a voicemail. Discussed with Maricarmen and advised that the patient to followed up with on Monday.     Message also sent to Triage [facility] to attempt to follow up Monday if necessary.

## 2021-06-28 ENCOUNTER — TELEPHONE (OUTPATIENT)
Dept: CARDIOLOGY | Facility: CLINIC | Age: 56
End: 2021-06-28

## 2021-06-28 RX ORDER — ATENOLOL 25 MG/1
25 TABLET ORAL DAILY
Qty: 90 TABLET | Refills: 3 | Status: SHIPPED | OUTPATIENT
Start: 2021-06-28 | End: 2022-04-22 | Stop reason: SDUPTHER

## 2021-06-28 NOTE — TELEPHONE ENCOUNTER
Called patient regarding monitor results which showed 10 seconds of what appeared to be possible ventricular tachycardia 140 bpm.  Strips are hard to interpret.  Could be tachycardia with aberrancy.  These are similar symptoms to what she has been having for years.  Would like to start low-dose atenolol and monitor her response.  We will get more information with further strips if her arrhythmia is recurrent.  In the meantime I had like to start a low-dose atenolol to help out with her symptoms.

## 2021-07-07 DIAGNOSIS — K21.9 GASTROESOPHAGEAL REFLUX DISEASE WITHOUT ESOPHAGITIS: ICD-10-CM

## 2021-07-07 RX ORDER — ESOMEPRAZOLE MAGNESIUM 40 MG/1
CAPSULE, DELAYED RELEASE ORAL
Qty: 90 CAPSULE | Refills: 0 | OUTPATIENT
Start: 2021-07-07

## 2021-07-12 ENCOUNTER — TELEPHONE (OUTPATIENT)
Dept: CARDIOLOGY | Facility: CLINIC | Age: 56
End: 2021-07-12

## 2021-07-12 NOTE — TELEPHONE ENCOUNTER
Just talked with patient.  Will give atenolol little bit more time to work before titrating.  We will try and see her back in the office and reassess

## 2021-07-12 NOTE — TELEPHONE ENCOUNTER
Patient called and left voicemail stating that she was recently started on Atenolol 25 mg daily. She does not think it's helping, she is still having symptoms.     She can be reached at [phone] thanks

## 2021-07-13 ENCOUNTER — OFFICE VISIT (OUTPATIENT)
Dept: FAMILY MEDICINE CLINIC | Facility: CLINIC | Age: 56
End: 2021-07-13

## 2021-07-13 VITALS
BODY MASS INDEX: 25.22 KG/M2 | TEMPERATURE: 97.3 F | DIASTOLIC BLOOD PRESSURE: 65 MMHG | HEIGHT: 67 IN | OXYGEN SATURATION: 97 % | HEART RATE: 73 BPM | WEIGHT: 160.7 LBS | SYSTOLIC BLOOD PRESSURE: 102 MMHG

## 2021-07-13 DIAGNOSIS — F41.1 GENERALIZED ANXIETY DISORDER: ICD-10-CM

## 2021-07-13 DIAGNOSIS — F51.04 PSYCHOPHYSIOLOGICAL INSOMNIA: Primary | ICD-10-CM

## 2021-07-13 DIAGNOSIS — Z13.220 SCREENING, LIPID: ICD-10-CM

## 2021-07-13 DIAGNOSIS — Z79.899 HIGH RISK MEDICATION USE: ICD-10-CM

## 2021-07-13 DIAGNOSIS — K21.9 GASTROESOPHAGEAL REFLUX DISEASE WITHOUT ESOPHAGITIS: ICD-10-CM

## 2021-07-13 PROCEDURE — 99214 OFFICE O/P EST MOD 30 MIN: CPT | Performed by: NURSE PRACTITIONER

## 2021-07-13 RX ORDER — ZOLPIDEM TARTRATE 5 MG/1
5 TABLET ORAL NIGHTLY PRN
Qty: 30 TABLET | Refills: 2 | Status: SHIPPED | OUTPATIENT
Start: 2021-07-13 | End: 2022-03-23 | Stop reason: SDUPTHER

## 2021-07-13 RX ORDER — ESOMEPRAZOLE MAGNESIUM 40 MG/1
40 CAPSULE, DELAYED RELEASE ORAL NIGHTLY
Qty: 90 CAPSULE | Refills: 0 | Status: SHIPPED | OUTPATIENT
Start: 2021-07-13 | End: 2022-03-23 | Stop reason: ALTCHOICE

## 2021-07-29 LAB
ALBUMIN SERPL-MCNC: 4 G/DL (ref 3.5–5.2)
ALBUMIN/GLOB SERPL: 1.3 G/DL
ALP SERPL-CCNC: 94 U/L (ref 39–117)
ALT SERPL-CCNC: 14 U/L (ref 1–33)
AMPHETAMINES UR QL SCN: NEGATIVE NG/ML
AST SERPL-CCNC: 19 U/L (ref 1–32)
BARBITURATES UR QL SCN: NEGATIVE NG/ML
BASOPHILS # BLD AUTO: 0.05 10*3/MM3 (ref 0–0.2)
BASOPHILS NFR BLD AUTO: 0.5 % (ref 0–1.5)
BENZODIAZ UR QL SCN: NEGATIVE NG/ML
BILIRUB SERPL-MCNC: 0.2 MG/DL (ref 0–1.2)
BUN SERPL-MCNC: 15 MG/DL (ref 6–20)
BUN/CREAT SERPL: 18.3 (ref 7–25)
BZE UR QL SCN: NEGATIVE NG/ML
CALCIUM SERPL-MCNC: 9.3 MG/DL (ref 8.6–10.5)
CANNABINOIDS UR QL SCN: NEGATIVE NG/ML
CHLORIDE SERPL-SCNC: 105 MMOL/L (ref 98–107)
CHOLEST SERPL-MCNC: 180 MG/DL (ref 0–200)
CO2 SERPL-SCNC: 29.2 MMOL/L (ref 22–29)
CREAT SERPL-MCNC: 0.82 MG/DL (ref 0.57–1)
CREAT UR-MCNC: 83.9 MG/DL (ref 20–300)
EOSINOPHIL # BLD AUTO: 0.1 10*3/MM3 (ref 0–0.4)
EOSINOPHIL NFR BLD AUTO: 0.9 % (ref 0.3–6.2)
ERYTHROCYTE [DISTWIDTH] IN BLOOD BY AUTOMATED COUNT: 12.7 % (ref 12.3–15.4)
FENTANYL UR-MCNC: NEGATIVE PG/ML
GLOBULIN SER CALC-MCNC: 3.1 GM/DL
GLUCOSE SERPL-MCNC: 90 MG/DL (ref 65–99)
HCT VFR BLD AUTO: 39.3 % (ref 34–46.6)
HDLC SERPL-MCNC: 46 MG/DL (ref 40–60)
HGB BLD-MCNC: 12.5 G/DL (ref 12–15.9)
IMM GRANULOCYTES # BLD AUTO: 0.03 10*3/MM3 (ref 0–0.05)
IMM GRANULOCYTES NFR BLD AUTO: 0.3 % (ref 0–0.5)
LABORATORY COMMENT REPORT: NORMAL
LDLC SERPL CALC-MCNC: 112 MG/DL (ref 0–100)
LDLC/HDLC SERPL: 2.38 {RATIO}
LYMPHOCYTES # BLD AUTO: 2.93 10*3/MM3 (ref 0.7–3.1)
LYMPHOCYTES NFR BLD AUTO: 26.7 % (ref 19.6–45.3)
MCH RBC QN AUTO: 28.4 PG (ref 26.6–33)
MCHC RBC AUTO-ENTMCNC: 31.8 G/DL (ref 31.5–35.7)
MCV RBC AUTO: 89.3 FL (ref 79–97)
MEPERIDINE UR QL: NEGATIVE NG/ML
METHADONE UR QL SCN: NEGATIVE NG/ML
MONOCYTES # BLD AUTO: 0.98 10*3/MM3 (ref 0.1–0.9)
MONOCYTES NFR BLD AUTO: 8.9 % (ref 5–12)
NEUTROPHILS # BLD AUTO: 6.89 10*3/MM3 (ref 1.7–7)
NEUTROPHILS NFR BLD AUTO: 62.7 % (ref 42.7–76)
NRBC BLD AUTO-RTO: 0.1 /100 WBC (ref 0–0.2)
OPIATES UR QL SCN: NEGATIVE NG/ML
OXYCODONE+OXYMORPHONE UR QL SCN: NEGATIVE NG/ML
PCP UR QL: NEGATIVE NG/ML
PH UR: 5.2 [PH] (ref 4.5–8.9)
PLATELET # BLD AUTO: 220 10*3/MM3 (ref 140–450)
POTASSIUM SERPL-SCNC: 4.4 MMOL/L (ref 3.5–5.2)
PROPOXYPH UR QL SCN: NEGATIVE NG/ML
PROT SERPL-MCNC: 7.1 G/DL (ref 6–8.5)
RBC # BLD AUTO: 4.4 10*6/MM3 (ref 3.77–5.28)
SODIUM SERPL-SCNC: 140 MMOL/L (ref 136–145)
SP GR UR: 1.01
TRAMADOL UR QL SCN: NEGATIVE NG/ML
TRIGL SERPL-MCNC: 122 MG/DL (ref 0–150)
TSH SERPL DL<=0.005 MIU/L-ACNC: 3.54 UIU/ML (ref 0.27–4.2)
VLDLC SERPL CALC-MCNC: 22 MG/DL (ref 5–40)
WBC # BLD AUTO: 10.98 10*3/MM3 (ref 3.4–10.8)

## 2021-09-07 DIAGNOSIS — F51.04 PSYCHOPHYSIOLOGICAL INSOMNIA: ICD-10-CM

## 2021-09-07 RX ORDER — PAROXETINE 30 MG/1
TABLET, FILM COATED ORAL
Qty: 30 TABLET | Refills: 0 | Status: SHIPPED | OUTPATIENT
Start: 2021-09-07 | End: 2021-10-06 | Stop reason: SDUPTHER

## 2021-09-09 ENCOUNTER — APPOINTMENT (OUTPATIENT)
Dept: WOMENS IMAGING | Facility: HOSPITAL | Age: 56
End: 2021-09-09

## 2021-09-09 PROCEDURE — 77067 SCR MAMMO BI INCL CAD: CPT | Performed by: RADIOLOGY

## 2021-09-09 PROCEDURE — 77063 BREAST TOMOSYNTHESIS BI: CPT | Performed by: RADIOLOGY

## 2021-10-06 DIAGNOSIS — F51.04 PSYCHOPHYSIOLOGICAL INSOMNIA: ICD-10-CM

## 2021-10-06 RX ORDER — PAROXETINE 30 MG/1
30 TABLET, FILM COATED ORAL EVERY MORNING
Qty: 90 TABLET | Refills: 0 | Status: SHIPPED | OUTPATIENT
Start: 2021-10-06 | End: 2022-01-10

## 2021-10-06 RX ORDER — PAROXETINE 30 MG/1
TABLET, FILM COATED ORAL
Qty: 90 TABLET | Refills: 1 | Status: CANCELLED | OUTPATIENT
Start: 2021-10-06

## 2021-10-06 NOTE — TELEPHONE ENCOUNTER
Rx Refill Note  Requested Prescriptions     Pending Prescriptions Disp Refills   • PARoxetine (PAXIL) 30 MG tablet 30 tablet 0     Sig: Take 1 tablet by mouth Every Morning.      Last office visit with prescribing clinician: 7/13/2021      Next office visit with prescribing clinician: Visit date not found            Dain Jameson Rep  10/06/21, 12:19 EDT

## 2021-11-08 ENCOUNTER — OFFICE VISIT (OUTPATIENT)
Dept: ORTHOPEDIC SURGERY | Facility: CLINIC | Age: 56
End: 2021-11-08

## 2021-11-08 VITALS — HEIGHT: 67 IN | BODY MASS INDEX: 25.11 KG/M2 | TEMPERATURE: 98.3 F | WEIGHT: 160 LBS

## 2021-11-08 DIAGNOSIS — M25.551 RIGHT HIP PAIN: Primary | ICD-10-CM

## 2021-11-08 PROCEDURE — 73502 X-RAY EXAM HIP UNI 2-3 VIEWS: CPT | Performed by: ORTHOPAEDIC SURGERY

## 2021-11-08 PROCEDURE — 99203 OFFICE O/P NEW LOW 30 MIN: CPT | Performed by: ORTHOPAEDIC SURGERY

## 2021-11-08 NOTE — PROGRESS NOTES
"  Patient: Sakina HURST    YOB: 1965    Medical Record Number: [id number]    Chief Complaints:  Right hip pain    History of Present Illness:     56 y.o. female patient who comes in today for evaluation of a new complaint of right groin pain.  It started about 3 months ago but she denies any discreet precipitating event or factor.   The pain is moderate and stabbing.  She reports that the hip periodically feels like it wants to \"give out\".  The pain is worse with prolonged standing or walking.  He works at a [employer] and is on her feet all day.  This does seem to aggravate the hip.  Rest helps.  She has tried prescription strength meloxicam and prescription strength Aleve but neither really seem to help.  Denies any shooting pain down the leg, weakness, numbness or paresthesias.    Allergies: No Known Allergies    Medications:     Home Medications:  Current Outpatient Medications on File Prior to Visit   Medication Sig Dispense Refill   • atenolol (TENORMIN) 25 MG tablet Take 1 tablet by mouth Daily. 90 tablet 3   • cycloSPORINE (RESTASIS) 0.05 % ophthalmic emulsion Administer 1 drop to both eyes Every 12 (Twelve) Hours.     • esomeprazole (nexIUM) 40 MG capsule Take 1 capsule by mouth Every Night. 90 capsule 0   • PARoxetine (PAXIL) 30 MG tablet Take 1 tablet by mouth Every Morning. 90 tablet 0   • zolpidem (AMBIEN) 5 MG tablet Take 1 tablet by mouth At Night As Needed for Sleep. 30 tablet 2     No current facility-administered medications on file prior to visit.     Past Medical History:   Diagnosis Date   • Acne    • Acquired hypothyroidism    • Anxiety    • Cancer (HCC) 09/2007    VULVAR CARCINOMA IN SITU   • Depression    • Endometriosis    • Fear of flying    • Hammertoe of right foot 04/24/2015    4TH RIGHT TOE   • Hemorrhoids    • Influenza A 02/06/2018   • Lateral epicondylitis of right elbow 07/26/2013   • Left knee pain 10/19/2017    SAW DR. GHASSAN COLE   • Left ovarian cyst 08/2006    " FOLLICLE CYST   • Lesion of left plantar nerve    • Menopause    • Onychomycosis 2014   • Primary insomnia    • Syncope 2018    SEEN AT [facility]   • Thrombosed hemorrhoids 2017    SAW DR. FAUZIA GIRON     Past Surgical History:   Procedure Laterality Date   • BREAST SURGERY Bilateral 2005    AUGMENTATION MAMMOPLASTY, DR. TONY FERGUSON AT [facility]   •  SECTION N/A    • CHOLECYSTECTOMY N/A 2016    DONE IN [place]   • COLONOSCOPY N/A 2017    ENTIRE COLON WNL, RESCOPE IN 5 YRS, DR. FAUZIA GIRON AT [facility]   • COLPOSCOPY W/ BIOPSY / CURETTAGE N/A 2007    BX OF PERIANAL LESION, PATH: SEVERE DYSPLASIA, CARCINOMA IN SITU, DR.REBECCA MICHELLE AT [facility]   • ENDOMETRIAL ABLATION N/A 2006    WITH EUA, LYSIS OF ADHESIONS, AND ASPIRATION OF LEFT OVARIAN FOLLICLE CYST, DR. TONY MICHELLE AT [facility]   • FOOT SURGERY  2020    plate and pen removal    • NEUROMA SURGERY Right 02/10/2016    right third intermetatarsal space; [facility]; Lee James DPM     Social History     Occupational History   • Not on file   Tobacco Use   • Smoking status: Never Smoker   • Smokeless tobacco: Never Used   Vaping Use   • Vaping Use: Never used   Substance and Sexual Activity   • Alcohol use: No   • Drug use: No   • Sexual activity: Defer      Social History     Social History Narrative   • Not on file     Family History   Problem Relation Age of Onset   • Heart disease Mother    • Cancer Mother         colon   • Hypertension Mother    • Depression Mother    • Colon polyps Mother    • Colon cancer Mother    • Mental illness Mother    • Heart disease Father    • Depression Father    • Mental illness Father    • Cancer Father    • No Known Problems Daughter    • No Known Problems Son        Review of Systems:      Constitutional: Denies fever, shaking or chills   Eyes: Denies change in visual acuity   HEENT: Denies nasal congestion or sore throat   Respiratory: Denies cough or shortness of breath  "  Cardiovascular: Denies chest pain or edema  Endocrine: Denies tremors, palpitations, intolerance of heat or cold, polyuria, polydipsia.  GI: Denies abdominal pain, nausea, vomiting, bloody stools or diarrhea  : Denies frequency, urgency, incontinence, retention, or nocturia.  Musculoskeletal: Denies numbness, tingling or loss of motor function except as above  Integument: Denies rash, lesion or ulceration   Neurologic: Denies headache or focal weakness, deficits  Heme: Denies spontaneous or excessive bleeding, epistaxis, hematuria, melena, fatigue, enlarged or tender lymph nodes.      All other pertinent positives and negatives as noted above in HPI.    Physical Exam: 56 y.o. female  Vitals:    11/08/21 1119   Temp: 98.3 °F (36.8 °C)   Weight: 72.6 kg (160 lb)   Height: 170.2 cm (67\")     General:  Patient is awake and alert.  Appears in no acute distress or discomfort.    Psych:  Affect and demeanor are appropriate.    Eyes:  Conjunctiva and sclera appear grossly normal.  Eyes track well and EOM seem to be intact.    Ears:  No gross abnormalities.  Hearing adequate for the exam.    Cardiovascular:  Regular rate and rhythm.    Lungs:  Good chest expansion.  Breathing unlabored.    Back:  No gross abnormalities.  No tenderness or step-offs.  No palpable masses.  Good motion.  Negative straight leg raise and cross straight leg raise for radicular pain.    Right lower extremity:  Skin is benign.  No palpable masses or adenopathy.  No focal area of tenderness.  Hip motion is limited and uncomfortable.  Positive Stinchfield maneuver.  Good strength with hip flexion, extension, abduction.  Good strength distally with plantarflexion and dorsiflexion of ankle, toes.  Sensation to light touch intact distally in the lower leg and foot.  Good pedal pulses with brisk cap refill.    Radiology: AP pelvis and lateral views of the right hip are ordered by myself and reviewed to evaluate the patient's complaint.  No comparison " films are immediately available.  The x-rays show mild to moderate hip osteoarthritis with subchondral sclerosis, cyst formation and osteophyte formation of the acetabulum.  Her joint space actually looks fairly good overall.  There are no obvious acute abnormalities, lesions, masses, or other concerning findings.    Assessment/Plan:  Right hip osteoarthritis    I suspect her pain is probably coming from the arthritis.  I think an injection would be helpful from both a diagnostic and therapeutic standpoint.  She is willing to try that.  I have ordered that for her.  I told her to notify me if the pain persist despite the injection.  If the injection does not help then I think we need to look at other potential sources and consider further work-up.  She is in agreement with that plan.    Donald Aceves MD    11/08/2021    CC to Radha Maldonado APRN

## 2022-01-07 ENCOUNTER — HOSPITAL ENCOUNTER (OUTPATIENT)
Dept: GENERAL RADIOLOGY | Facility: HOSPITAL | Age: 57
Discharge: HOME | End: 2022-01-07
Admitting: ORTHOPAEDIC SURGERY

## 2022-01-07 DIAGNOSIS — M25.551 RIGHT HIP PAIN: ICD-10-CM

## 2022-01-07 DIAGNOSIS — F51.04 PSYCHOPHYSIOLOGICAL INSOMNIA: ICD-10-CM

## 2022-01-07 PROCEDURE — 25010000002 IOPAMIDOL 61 % SOLUTION: Performed by: ORTHOPAEDIC SURGERY

## 2022-01-07 PROCEDURE — 77002 NEEDLE LOCALIZATION BY XRAY: CPT

## 2022-01-07 PROCEDURE — 0 LIDOCAINE 1 % SOLUTION: Performed by: ORTHOPAEDIC SURGERY

## 2022-01-07 PROCEDURE — 25010000002 METHYLPREDNISOLONE PER 80 MG: Performed by: ORTHOPAEDIC SURGERY

## 2022-01-07 RX ORDER — METHYLPREDNISOLONE ACETATE 80 MG/ML
80 INJECTION, SUSPENSION INTRA-ARTICULAR; INTRALESIONAL; INTRAMUSCULAR; SOFT TISSUE ONCE
Status: COMPLETED | OUTPATIENT
Start: 2022-01-07 | End: 2022-01-07

## 2022-01-07 RX ORDER — BUPIVACAINE HYDROCHLORIDE 2.5 MG/ML
5 INJECTION, SOLUTION EPIDURAL; INFILTRATION; INTRACAUDAL ONCE
Status: COMPLETED | OUTPATIENT
Start: 2022-01-07 | End: 2022-01-07

## 2022-01-07 RX ORDER — LIDOCAINE HYDROCHLORIDE 10 MG/ML
10 INJECTION, SOLUTION INFILTRATION; PERINEURAL ONCE
Status: COMPLETED | OUTPATIENT
Start: 2022-01-07 | End: 2022-01-07

## 2022-01-07 RX ADMIN — IOPAMIDOL 1 ML: 612 INJECTION, SOLUTION INTRAVENOUS at 13:29

## 2022-01-07 RX ADMIN — LIDOCAINE HYDROCHLORIDE 5 ML: 10 INJECTION, SOLUTION INFILTRATION; PERINEURAL at 13:26

## 2022-01-07 RX ADMIN — BUPIVACAINE HYDROCHLORIDE 5 ML: 2.5 INJECTION, SOLUTION EPIDURAL; INFILTRATION; INTRACAUDAL at 13:30

## 2022-01-07 RX ADMIN — METHYLPREDNISOLONE ACETATE 80 MG: 80 INJECTION, SUSPENSION INTRA-ARTICULAR; INTRALESIONAL; INTRAMUSCULAR; SOFT TISSUE at 13:30

## 2022-01-10 RX ORDER — PAROXETINE 30 MG/1
TABLET, FILM COATED ORAL
Qty: 90 TABLET | Refills: 0 | Status: SHIPPED | OUTPATIENT
Start: 2022-01-10 | End: 2022-03-23 | Stop reason: SDUPTHER

## 2022-01-10 NOTE — TELEPHONE ENCOUNTER
Rx Refill Note  Requested Prescriptions     Pending Prescriptions Disp Refills   • PARoxetine (PAXIL) 30 MG tablet [Pharmacy Med Name: PARoxetine HCL 30 MG TABLET] 90 tablet 0     Sig: TAKE ONE TABLET BY MOUTH EVERY MORNING      Last office visit with prescribing clinician: 7/13/2021      Next office visit with prescribing clinician: Visit date not found            Dorys Sampson MA  01/10/22, 10:20 EST

## 2022-02-18 ENCOUNTER — TELEPHONE (OUTPATIENT)
Dept: ORTHOPEDIC SURGERY | Facility: CLINIC | Age: 57
End: 2022-02-18

## 2022-02-18 NOTE — TELEPHONE ENCOUNTER
Caller: Sakina HURST    Relationship to patient: Self    Best call back number:[phone]    Patient is needing: CALLBACK TO SCHEDULE [place] ADILENE GUIDE PAIN MED INJ RT GROIN  PATIENT LAST INJECTION/DATE SEEN 1.7.22    UNABLE TO WARM TRANSFER

## 2022-03-02 ENCOUNTER — OFFICE VISIT (OUTPATIENT)
Dept: ORTHOPEDIC SURGERY | Facility: CLINIC | Age: 57
End: 2022-03-02

## 2022-03-02 VITALS — WEIGHT: 160 LBS | TEMPERATURE: 97.1 F | BODY MASS INDEX: 25.11 KG/M2 | HEIGHT: 67 IN

## 2022-03-02 DIAGNOSIS — M16.11 PRIMARY OSTEOARTHRITIS OF RIGHT HIP: Primary | ICD-10-CM

## 2022-03-02 PROCEDURE — 99213 OFFICE O/P EST LOW 20 MIN: CPT | Performed by: ORTHOPAEDIC SURGERY

## 2022-03-02 RX ORDER — MELOXICAM 15 MG/1
15 TABLET ORAL DAILY PRN
Qty: 30 TABLET | Refills: 2 | Status: SHIPPED | OUTPATIENT
Start: 2022-03-02 | End: 2022-05-12 | Stop reason: HOSPADM

## 2022-03-02 NOTE — PROGRESS NOTES
Chief complaint: Right hip pain    Ms. Guerrier follows up for her right hip. I saw her back in November for this issue and referred her for an intra-articular injection. It helped tremendously for about a month. She tells me that she was 100 sent normal for 1 month and then her pain gradually recurred. She is now basically back to where she was when I last saw her.    Right hip is just briefly examined. Skin is benign. No focal areas of tenderness. She has a positive Stinchfield maneuver. Gait is normal.    Her previous x-rays of the hip are again reviewed. She has cystic change in the acetabulum consistent with moderate hip osteoarthritis    Assessment: Right hip osteoarthritis    Plan: I would refer her for another injection but it only helped for a month. I think we need to look at alternative options at this point. I suggested we try putting her on an oral anti-inflammatory. She is willing to try it. I gave her prescription for meloxicam. Risk were discussed. I have referred her to physical therapy as well. I think that is worthwhile for her. I do suggest she get an appointment with a hip specialist to get a 2nd opinion. She is going to need to consider an arthroplasty at some point and I think will be good for her to get some more information about this option. She will follow-up with me as needed.    Donald Aceves MD

## 2022-03-07 ENCOUNTER — OFFICE VISIT (OUTPATIENT)
Dept: ORTHOPEDIC SURGERY | Facility: CLINIC | Age: 57
End: 2022-03-07

## 2022-03-07 VITALS — HEIGHT: 67 IN | RESPIRATION RATE: 18 BRPM | TEMPERATURE: 96.7 F | WEIGHT: 161 LBS | BODY MASS INDEX: 25.27 KG/M2

## 2022-03-07 DIAGNOSIS — M16.11 PRIMARY OSTEOARTHRITIS OF RIGHT HIP: Primary | ICD-10-CM

## 2022-03-07 PROCEDURE — 99214 OFFICE O/P EST MOD 30 MIN: CPT | Performed by: ORTHOPAEDIC SURGERY

## 2022-03-08 PROBLEM — M16.11 PRIMARY OSTEOARTHRITIS OF RIGHT HIP: Status: ACTIVE | Noted: 2022-03-08

## 2022-03-08 RX ORDER — MELOXICAM 15 MG/1
15 TABLET ORAL ONCE
Status: CANCELLED | OUTPATIENT
Start: 2022-05-11 | End: 2022-03-08

## 2022-03-08 RX ORDER — PREGABALIN 75 MG/1
150 CAPSULE ORAL ONCE
Status: CANCELLED | OUTPATIENT
Start: 2022-05-11 | End: 2022-03-08

## 2022-03-08 RX ORDER — VANCOMYCIN HYDROCHLORIDE 1 G/200ML
15 INJECTION, SOLUTION INTRAVENOUS ONCE
Status: CANCELLED | OUTPATIENT
Start: 2022-05-11 | End: 2022-03-08

## 2022-03-08 RX ORDER — ACETAMINOPHEN 325 MG/1
1000 TABLET ORAL ONCE
Status: CANCELLED | OUTPATIENT
Start: 2022-05-11 | End: 2022-03-08

## 2022-03-08 RX ORDER — POVIDONE-IODINE 10 MG/ML
SOLUTION TOPICAL ONCE
Status: CANCELLED | OUTPATIENT
Start: 2022-05-11 | End: 2022-03-08

## 2022-03-08 RX ORDER — CHLORHEXIDINE GLUCONATE 500 MG/1
CLOTH TOPICAL TAKE AS DIRECTED
Status: CANCELLED | OUTPATIENT
Start: 2022-03-08

## 2022-03-08 RX ORDER — CEFAZOLIN SODIUM 2 G/100ML
2 INJECTION, SOLUTION INTRAVENOUS ONCE
Status: CANCELLED | OUTPATIENT
Start: 2022-05-11 | End: 2022-03-08

## 2022-03-08 NOTE — PROGRESS NOTES
General Exam    Patient: Sakina HURST    YOB: 1965    Medical Record Number: [id number]    Chief Complaints: Right hip pain    History of Present Illness:     56 y.o. female patient who presents for evaluation treatment of right hip pain.  Patient states she has had issues over the past 5 months she has diagnosis of osteoarthritis.  Patient states she did have a hip injection on January 6, 2022 which only lasted for about 5 weeks at which point symptoms returned.  She has tried over-the-counter anti-inflammatories.  She describes her symptoms are pain in the groin and limited motion and these have affected her normal daily activity.  She is here to discuss other treatment options.  No recent trauma.    Denies any numbness or tingling.  Denies any fevers, cough or shortness of breath.        Patient states she has seen cardiology in the past for an abnormal beat.    Patient denies any history of diabetes, cancer, blood clots, blood thinner, GI, liver, lung, kidney, anemia, dental issues or nicotine or tobacco use.    Allergies: No Known Allergies    Home Medications:      Current Outpatient Medications:   •  atenolol (TENORMIN) 25 MG tablet, Take 1 tablet by mouth Daily., Disp: 90 tablet, Rfl: 3  •  cycloSPORINE (RESTASIS) 0.05 % ophthalmic emulsion, Administer 1 drop to both eyes Every 12 (Twelve) Hours., Disp: , Rfl:   •  PARoxetine (PAXIL) 30 MG tablet, TAKE ONE TABLET BY MOUTH EVERY MORNING, Disp: 90 tablet, Rfl: 0  •  zolpidem (AMBIEN) 5 MG tablet, Take 1 tablet by mouth At Night As Needed for Sleep., Disp: 30 tablet, Rfl: 2  •  esomeprazole (nexIUM) 40 MG capsule, Take 1 capsule by mouth Every Night., Disp: 90 capsule, Rfl: 0  •  meloxicam (MOBIC) 15 MG tablet, Take 1 tablet by mouth Daily As Needed for Moderate Pain . Take as directed with food., Disp: 30 tablet, Rfl: 2    Past Medical History:   Diagnosis Date   • Acne    • Acquired hypothyroidism    • Anxiety    • Cancer (HCC) 09/2007     VULVAR CARCINOMA IN SITU   • Depression    • Endometriosis    • Fear of flying    • Hammertoe of right foot 2015    4TH RIGHT TOE   • Hemorrhoids    • Influenza A 2018   • Lateral epicondylitis of right elbow 2013   • Left knee pain 10/19/2017    SAW DR. GHASSAN COLE   • Left ovarian cyst 2006    FOLLICLE CYST   • Lesion of left plantar nerve    • Menopause    • Onychomycosis 2014   • Primary insomnia    • Syncope 2018    SEEN AT [facility]   • Thrombosed hemorrhoids 2017    SAW DR. FAUZIA GIRON       Past Surgical History:   Procedure Laterality Date   • BREAST SURGERY Bilateral 2005    AUGMENTATION MAMMOPLASTY, DR. TONY FERGUSON AT [facility]   •  SECTION N/A    • CHOLECYSTECTOMY N/A     DONE IN [place]   • COLONOSCOPY N/A 2017    ENTIRE COLON WNL, RESCOPE IN 5 YRS, DR. FAUZIA GIRON AT [facility]   • COLPOSCOPY W/ BIOPSY / CURETTAGE N/A 2007    BX OF PERIANAL LESION, PATH: SEVERE DYSPLASIA, CARCINOMA IN SITU, DR.REBECCA MICHELLE AT [facility]   • ENDOMETRIAL ABLATION N/A 2006    WITH EUA, LYSIS OF ADHESIONS, AND ASPIRATION OF LEFT OVARIAN FOLLICLE CYST, DR. TONY MICHELLE AT [facility]   • FOOT SURGERY  2020    plate and pen removal    • NEUROMA SURGERY Right 02/10/2016    right third intermetatarsal space; [facility]; Lee James DPM       Social History     Occupational History   • Not on file   Tobacco Use   • Smoking status: Never Smoker   • Smokeless tobacco: Never Used   Vaping Use   • Vaping Use: Never used   Substance and Sexual Activity   • Alcohol use: No   • Drug use: No   • Sexual activity: Defer      Social History     Social History Narrative   • Not on file       Family History   Problem Relation Age of Onset   • Heart disease Mother    • Cancer Mother         colon   • Hypertension Mother    • Depression Mother    • Colon polyps Mother    • Colon cancer Mother    • Mental illness Mother    • Heart disease Father    • Depression Father    •  "Mental illness Father    • Cancer Father    • No Known Problems Daughter    • No Known Problems Son        Review of Systems:      Constitutional: Denies fever, shaking or chills         All other pertinent positives and negatives as noted above in HPI.    Physical Exam: 56 y.o. female    Vitals:    03/07/22 1014   Resp: 18   Temp: 96.7 °F (35.9 °C)   Weight: 73 kg (161 lb)   Height: 170.2 cm (67\")       General:  Patient is awake and alert.  Appears in no acute distress or discomfort.        Musculoskeletal/Extremities:    Right lower extremity: Normal chest palpation on the lateral aspect of the hip.  Hip range of motion is limited due to pain.  Knee straight leg raise positive Stinchfield.  Strength intact.  Sensation tact distally touch.  2+ pedal pulses.  Patient has an antalgic gait unassisted.         Radiology:       Previous imaging include AP pelvis and lateral hip were reviewed to evaluate the patient's complaint/s.    Imaging demonstrates moderate to severe degenerative changes in the right hip joint with evidence of sclerosis and osteophyte formation.  Also apparent cystic changes involving the femoral head.     No imaging for comparison.    Assessment: Right hip osteoarthritis      Plan:      Patient was diagnosed with right hip osteoarthritis.  We discussed both conservative and surgical treatment options.  Patient is try conservative treatment options over the past 5 months and continues to be symptomatic which is affecting her normal daily activities and overall quality of life.  I do feel that total hip arthroplasty is warranted.  We did discuss right total hip replacement in detail including risk and benefits. I did discuss risk and benefits with the patient with risk including but not limited to bleeding, infection, damage nearby nerves, vessels, tendons, ligaments, continued pain, worsening pain, fracture, dislocation, leg length discrepancy, blood clots, even death with anesthesia and possible " need for future procedures surgeries.  Patient understood this and has chosen to proceed.    Patient will require cardiac clearance given abnormal history.    Patient will also need to wait a minimum of 3 months since last injection which was on January 6, 2022         We will plan to schedule a right total arthroplasty.    All questions were answered.  Patient understands and agrees with the plan.    Maciel Luis MD    03/07/2022    CC to Radha Maldonado APRN

## 2022-03-11 DIAGNOSIS — F51.04 PSYCHOPHYSIOLOGICAL INSOMNIA: ICD-10-CM

## 2022-03-14 RX ORDER — ZOLPIDEM TARTRATE 5 MG/1
TABLET ORAL
Qty: 30 TABLET | Refills: 0 | OUTPATIENT
Start: 2022-03-14

## 2022-03-14 NOTE — TELEPHONE ENCOUNTER
Rx Refill Note  Requested Prescriptions     Pending Prescriptions Disp Refills   • zolpidem (AMBIEN) 5 MG tablet [Pharmacy Med Name: ZOLPIDEM TARTRATE 5 MG TABLET] 30 tablet      Sig: TAKE ONE TABLET BY MOUTH AT NIGHT AS NEEDED FOR SLEEP      Last office visit with prescribing clinician: 7/13/2021      Next office visit with prescribing clinician: Visit date not found            Carmen Funez MA  03/14/22, 13:15 EDT

## 2022-03-23 ENCOUNTER — OFFICE VISIT (OUTPATIENT)
Dept: FAMILY MEDICINE CLINIC | Facility: CLINIC | Age: 57
End: 2022-03-23

## 2022-03-23 ENCOUNTER — TELEPHONE (OUTPATIENT)
Dept: ORTHOPEDIC SURGERY | Facility: CLINIC | Age: 57
End: 2022-03-23

## 2022-03-23 VITALS
HEART RATE: 76 BPM | DIASTOLIC BLOOD PRESSURE: 68 MMHG | SYSTOLIC BLOOD PRESSURE: 108 MMHG | HEIGHT: 67 IN | OXYGEN SATURATION: 97 % | BODY MASS INDEX: 25.72 KG/M2 | TEMPERATURE: 96.8 F | WEIGHT: 163.9 LBS

## 2022-03-23 DIAGNOSIS — F51.04 PSYCHOPHYSIOLOGICAL INSOMNIA: Primary | ICD-10-CM

## 2022-03-23 DIAGNOSIS — F41.1 GENERALIZED ANXIETY DISORDER: ICD-10-CM

## 2022-03-23 DIAGNOSIS — F33.41 RECURRENT MAJOR DEPRESSIVE DISORDER, IN PARTIAL REMISSION: ICD-10-CM

## 2022-03-23 PROCEDURE — 99214 OFFICE O/P EST MOD 30 MIN: CPT | Performed by: NURSE PRACTITIONER

## 2022-03-23 RX ORDER — PAROXETINE 30 MG/1
30 TABLET, FILM COATED ORAL EVERY MORNING
Qty: 90 TABLET | Refills: 1 | Status: SHIPPED | OUTPATIENT
Start: 2022-03-23 | End: 2022-10-21

## 2022-03-23 RX ORDER — ZOLPIDEM TARTRATE 5 MG/1
5 TABLET ORAL NIGHTLY PRN
Qty: 30 TABLET | Refills: 2 | Status: SHIPPED | OUTPATIENT
Start: 2022-03-23 | End: 2022-10-21 | Stop reason: SDUPTHER

## 2022-03-23 NOTE — PROGRESS NOTES
"Chief Complaint  Insomnia (F/u insomnia )    Subjective          Sakina HURST presents to [facility]  History of Present Illness   Patient is here to follow-up on insomnia.  She has long history of Ambien use.  She is currently only taking Ambien 5 mg for insomnia when traveling.  She denies any side effects.  She wishes to continue this.  She has tried other medications in the past which did not work.    Taking mobic for right hip pain with NICOLASA upcoming May.  She is somewhat anxious about it but also is looking forward to having her pain relieved.    She is taking Paxil for anxiety and depression which is well controlled on Paxil.  She wishes to continue dose.  She has previously tried to wean and been unsuccessful.    mammo yrly and clear-outside facility  PHQ-9 Total Score:  1  SUSAN 7 Total Score: 0        Objective   Vital Signs:   /68   Pulse 76   Temp 96.8 °F (36 °C)   Ht 170.2 cm (67\")   Wt 74.3 kg (163 lb 14.4 oz)   SpO2 97%   BMI 25.67 kg/m²            Physical Exam  Vitals and nursing note reviewed.   Constitutional:       General: She is not in acute distress.     Appearance: She is well-developed. She is not ill-appearing or diaphoretic.   HENT:      Head: Normocephalic and atraumatic.   Eyes:      General:         Right eye: No discharge.         Left eye: No discharge.      Conjunctiva/sclera: Conjunctivae normal.   Cardiovascular:      Rate and Rhythm: Normal rate and regular rhythm.      Heart sounds: Normal heart sounds.   Pulmonary:      Effort: Pulmonary effort is normal.      Breath sounds: Normal breath sounds.   Abdominal:      General: Bowel sounds are normal.      Palpations: Abdomen is soft.      Tenderness: There is no abdominal tenderness.   Musculoskeletal:         General: No deformity.      Comments: Gait smooth and steady   Skin:     General: Skin is warm and dry.   Neurological:      General: No focal deficit present.      Mental Status: She " is alert and oriented to person, place, and time.   Psychiatric:         Attention and Perception: Attention and perception normal.         Mood and Affect: Mood and affect normal.         Speech: Speech normal.         Behavior: Behavior normal. Behavior is cooperative.         Thought Content: Thought content normal.         Cognition and Memory: Cognition normal.      Comments: Very pleasant and coversant        Result Review :                 Assessment and Plan    Diagnoses and all orders for this visit:    1. Psychophysiological insomnia (Primary)  -     zolpidem (AMBIEN) 5 MG tablet; Take 1 tablet by mouth At Night As Needed for Sleep.  Dispense: 30 tablet; Refill: 2    2. Generalized anxiety disorder  -     PARoxetine (PAXIL) 30 MG tablet; Take 1 tablet by mouth Every Morning.  Dispense: 90 tablet; Refill: 1    3. Recurrent major depressive disorder, in partial remission (HCC)      Insomnia: We will refill Ambien.  She seems to be using judiciously.  Kimani reviewed and appropriate.  We will get an updated controlled substance agreement today as hers is .  She is aware of side effects and risks as well as cautions with Ambien.  She will need UDS at next visit.    Anxiety and depression seem to be very well controlled with Paxil.  We will continue current dose.  SUSAN and PHQ are both very low.    We will get NAVEED to obtain mammogram from outside facility.    Discussed upcoming NICOLASA and expected course.      Follow Up   Return in about 6 months (around 2022) for Annual physical.  Patient was given instructions and counseling regarding her condition or for health maintenance advice. Please see specific information pulled into the AVS if appropriate.

## 2022-03-24 ENCOUNTER — TELEPHONE (OUTPATIENT)
Dept: ORTHOPEDIC SURGERY | Facility: CLINIC | Age: 57
End: 2022-03-24

## 2022-03-24 NOTE — TELEPHONE ENCOUNTER
I SPOKE WITH WEST[facility] WITH [facility],  THE PATIENT HAS LIMITED COVERAGE, IT ONLY COVERS WELLNESS & PREVENTATIVE MEDICINE, THE PATIENT DOES NOT HAVE MAJOR MEDICAL, SO HER SURGERY WOULD NOT BE COVERED,   I THEN SPOKE WITH PATIENT AND SHE IS GOING TO SHOP TODAY IN HOPES OF GETTING AN INSURANCE SO THAT SHE CAN HAVE HER SURGERY 05-11-22, SHE IS TO CALL ME BACK TODAY,LLR

## 2022-03-29 ENCOUNTER — TELEPHONE (OUTPATIENT)
Dept: ORTHOPEDIC SURGERY | Facility: CLINIC | Age: 57
End: 2022-03-29

## 2022-03-29 NOTE — TELEPHONE ENCOUNTER
I SPOKE WITH WESTLEY WITH BENEFITS, THIS PATIENT DOES NOT HAVE MAJOR MEDICAL,  HER POLICY IS ONLY FOR WELLNESS AND PREVENTIVE MEDICINE,  I THEN SPOKE WITH THE PATIENT SHE IS GOING TO LOOK INTO GETTING DIFFERENT INSURANCE BEFORE HER SURGERY DATED 05-11-22,LLR

## 2022-04-11 ENCOUNTER — TELEPHONE (OUTPATIENT)
Dept: CARDIOLOGY | Facility: CLINIC | Age: 57
End: 2022-04-11

## 2022-04-11 NOTE — TELEPHONE ENCOUNTER
----- Message from Sakina HURST sent at 4/11/2022 11:06 AM EDT -----  Regarding: Cardio clearance for surgery  Hello,     I am having hip replacement surgery on May 11, 2022 with Maciel Luis MD of [facility]. Will you please send message to their group with clearance?     Thank you for your assistance.   Sakina Hurst

## 2022-04-11 NOTE — TELEPHONE ENCOUNTER
More than likely she would be fine for surgery but I have not seen her since our initial visit and since starting atenolol.  Would be great if she could swing into the office to get an EKG and just be reevaluated before proceeding forward with surgery.  Can either be with myself or one of the nurse practitioners.  Thanks.

## 2022-04-22 ENCOUNTER — OFFICE VISIT (OUTPATIENT)
Dept: CARDIOLOGY | Facility: CLINIC | Age: 57
End: 2022-04-22

## 2022-04-22 ENCOUNTER — DOCUMENTATION (OUTPATIENT)
Dept: CARDIOLOGY | Facility: CLINIC | Age: 57
End: 2022-04-22

## 2022-04-22 VITALS
HEART RATE: 72 BPM | DIASTOLIC BLOOD PRESSURE: 72 MMHG | HEIGHT: 67 IN | SYSTOLIC BLOOD PRESSURE: 110 MMHG | WEIGHT: 162.4 LBS | BODY MASS INDEX: 25.49 KG/M2 | OXYGEN SATURATION: 98 %

## 2022-04-22 DIAGNOSIS — Z86.79 HISTORY OF RHEUMATIC FEVER AS A CHILD: ICD-10-CM

## 2022-04-22 DIAGNOSIS — M16.11 PRIMARY OSTEOARTHRITIS OF RIGHT HIP: ICD-10-CM

## 2022-04-22 DIAGNOSIS — R00.2 PALPITATIONS: Primary | ICD-10-CM

## 2022-04-22 DIAGNOSIS — I47.29 VENTRICULAR TACHYCARDIA, NON-SUSTAINED: ICD-10-CM

## 2022-04-22 PROBLEM — R00.0 WIDE-COMPLEX TACHYCARDIA: Status: ACTIVE | Noted: 2022-04-22

## 2022-04-22 PROCEDURE — 93000 ELECTROCARDIOGRAM COMPLETE: CPT | Performed by: NURSE PRACTITIONER

## 2022-04-22 PROCEDURE — 99214 OFFICE O/P EST MOD 30 MIN: CPT | Performed by: NURSE PRACTITIONER

## 2022-04-22 RX ORDER — ATENOLOL 25 MG/1
25 TABLET ORAL DAILY
Qty: 90 TABLET | Refills: 3 | Status: SHIPPED | OUTPATIENT
Start: 2022-04-22

## 2022-04-22 NOTE — PROGRESS NOTES
[facility] Follow Up Office Note     Encounter Date:22  Patient:Sakina HURST  :1965  MRN:[id number]      Chief Complaint:   Chief Complaint   Patient presents with   • Cardiac Clearance     R Hip Arthroplasty         History of Presenting Illness:        Sakina HURST is a 57 y.o. female who is here for follow-up of palpitations and surgical clearance.  She is a patient of Dr. Muir.    Patient has a past medical history significant for depression/anxiety and rheumatic fever as a child on suppressive antibiotics.  She had echocardiogram 2021 given history of rheumatic heart disease which was essentially normal.  Patient was seen by Dr. Muir for initial consultation 5/10/2021 for complaint of palpitations.  She reported she has been having palpitations for 10 years but over the months prior to appointment they had become more frequent.  An event monitor was ordered which demonstrated 10 seconds of wide-complex tachycardia at 140 bpm, VT versus tachyarrhythmia with aberrant conduction.  Low-dose atenolol started.  Patient has not been seen for follow-up since this time.    Patient called the office for surgical clearance for hip replacement surgery which is scheduled on May 11, 2022.  She is seen in clinic today and reports she is no longer having palpitations.  She is taking daily atenolol.  She also denies chest pain, shortness of breath, dizziness, lower extremity edema and orthopnea.  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    Review of Systems:  Review of Systems   Cardiovascular: Negative for chest pain, dyspnea on exertion, leg swelling, orthopnea and palpitations.   Respiratory: Negative for shortness of breath.    Musculoskeletal: Positive for arthritis and joint pain.       Current Outpatient Medications on File Prior to Visit   Medication Sig Dispense Refill   • cycloSPORINE (RESTASIS) 0.05 % ophthalmic emulsion  Administer 1 drop to both eyes Every 12 (Twelve) Hours.     • meloxicam (MOBIC) 15 MG tablet Take 1 tablet by mouth Daily As Needed for Moderate Pain . Take as directed with food. 30 tablet 2   • PARoxetine (PAXIL) 30 MG tablet Take 1 tablet by mouth Every Morning. 90 tablet 1   • zolpidem (AMBIEN) 5 MG tablet Take 1 tablet by mouth At Night As Needed for Sleep. 30 tablet 2   • [DISCONTINUED] atenolol (TENORMIN) 25 MG tablet Take 1 tablet by mouth Daily. 90 tablet 3     No current facility-administered medications on file prior to visit.       No Known Allergies    Past Medical History:   Diagnosis Date   • Acne    • Acquired hypothyroidism    • Anxiety    • Cancer (HCC) 2007    VULVAR CARCINOMA IN SITU   • Depression    • Endometriosis    • Fear of flying    • Hammertoe of right foot 2015    4TH RIGHT TOE   • Hemorrhoids    • Influenza A 2018   • Lateral epicondylitis of right elbow 2013   • Left knee pain 10/19/2017    SAW DR. GHASSAN COLE   • Left ovarian cyst 2006    FOLLICLE CYST   • Lesion of left plantar nerve    • Menopause    • Onychomycosis 2014   • Primary insomnia    • Syncope 2018    SEEN AT [facility]   • Thrombosed hemorrhoids 2017    SAW DR. FAUZIA GIRON       Past Surgical History:   Procedure Laterality Date   • BREAST SURGERY Bilateral 2005    AUGMENTATION MAMMOPLASTY, DR. TONY FERGUSON AT [facility]   •  SECTION N/A    • CHOLECYSTECTOMY N/A     DONE IN [place]   • COLONOSCOPY N/A 2017    ENTIRE COLON WNL, RESCOPE IN 5 YRS, DR. FAUZIA GIRON AT [facility]   • COLPOSCOPY W/ BIOPSY / CURETTAGE N/A 2007    BX OF PERIANAL LESION, PATH: SEVERE DYSPLASIA, CARCINOMA IN SITU, DR.REBECCA MICHELLE AT [facility]   • ENDOMETRIAL ABLATION N/A 2006    WITH EUA, LYSIS OF ADHESIONS, AND ASPIRATION OF LEFT OVARIAN FOLLICLE CYST, DR. TONY MICHELLE AT [facility]   • FOOT SURGERY  2020    plate and pen removal    • NEUROMA SURGERY Right 02/10/2016    right third  "[facility] space; [facility]; Lee James DPM       Social History     Socioeconomic History   • Marital status:    Tobacco Use   • Smoking status: Never Smoker   • Smokeless tobacco: Never Used   • Tobacco comment: Caffeine - Yes   Vaping Use   • Vaping Use: Never used   Substance and Sexual Activity   • Alcohol use: No   • Drug use: No   • Sexual activity: Defer       Family History   Problem Relation Age of Onset   • Heart disease Mother    • Cancer Mother         colon   • Hypertension Mother    • Depression Mother    • Colon polyps Mother    • Colon cancer Mother    • Mental illness Mother    • Heart disease Father    • Depression Father    • Mental illness Father    • Cancer Father    • No Known Problems Daughter    • No Known Problems Son        The following portions of the patient's history were reviewed and updated as appropriate: allergies, current medications, past family history, past medical history, past social history, past surgical history and problem list.       Objective:       Vitals:    04/22/22 1119   BP: 110/72   BP Location: Left arm   Patient Position: Sitting   Pulse: 72   SpO2: 98%   Weight: 73.7 kg (162 lb 6.4 oz)   Height: 170.2 cm (67\")         Physical Exam:  Constitutional: Well appearing, well developed, no acute distress   HENT: Oropharynx clear and membrane moist  Eyes: Normal conjunctiva, no sclera icterus  Neck: Supple, no carotid bruit bilaterally  Cardiovascular: Regular rate and rhythm, No Murmur, No bilateral lower extremity edema  Pulmonary: Normal respiratory effort, normal lung sounds, no wheezing  Neurological: Alert and orient x 3  Skin: Warm, dry, no ecchymosis, no rash  Psych: Appropriate mood and affect. Normal judgment and insight         Lab Results   Component Value Date     07/28/2021     05/13/2019    K 4.4 07/28/2021    K 3.9 05/13/2019     07/28/2021     05/13/2019    CO2 29.2 (H) 07/28/2021    CO2 28.7 05/13/2019    " BUN 15 07/28/2021    BUN 17 05/13/2019    CREATININE 0.82 07/28/2021    CREATININE 0.75 05/13/2019    EGFRIFNONA 72 07/28/2021    EGFRIFNONA 81 05/13/2019    EGFRIFAFRI 88 07/28/2021    EGFRIFAFRI 98 05/13/2019    GLUCOSE 90 07/28/2021    GLUCOSE 87 05/13/2019    CALCIUM 9.3 07/28/2021    CALCIUM 9.7 05/13/2019    PROTENTOTREF 7.1 07/28/2021    PROTENTOTREF 7.0 05/13/2019    ALBUMIN 4.00 07/28/2021    ALBUMIN 4.00 05/13/2019    BILITOT 0.2 07/28/2021    BILITOT 0.2 05/13/2019    AST 19 07/28/2021    AST 16 05/13/2019    ALT 14 07/28/2021    ALT 17 05/13/2019     Lab Results   Component Value Date    WBC 10.98 (H) 07/28/2021    WBC 9.17 05/13/2019    HGB 12.5 07/28/2021    HGB 12.7 05/13/2019    HCT 39.3 07/28/2021    HCT 40.4 05/13/2019    MCV 89.3 07/28/2021    MCV 92.7 05/13/2019     07/28/2021     05/13/2019     Lab Results   Component Value Date    TRIG 122 07/28/2021    TRIG 105 05/13/2019    HDL 46 07/28/2021    HDL 57 05/13/2019     (H) 07/28/2021    LDL 91 05/13/2019     No results found for: PROBNP, BNP  Lab Results   Component Value Date    TROPONINI <0.012 02/07/2018     Lab Results   Component Value Date    TSH 3.540 07/28/2021    TSH 3.810 07/15/2020           ECG 12 Lead    Date/Time: 4/22/2022 11:23 AM  Performed by: Sangita Zavaleta APRN  Authorized by: Sangita Zavaleta APRN   Comparison: compared with previous ECG from 5/10/2021  Similar to previous ECG  Rhythm: sinus rhythm  Rate: normal  Other findings: low voltage           14-day cardiac event monitor 6/23/2021 through 7/6/2021:  · An abnormal monitor study.  · One episode of nonsustained ventricular tachycardia lasting for 10 secs.  · No symptoms reported during study      Echocardiogram 2/24/2021:  · Estimated left ventricular EF = 58% Left ventricular systolic function is normal.  · Left ventricular diastolic function was normal.     There is no significant valvular heart disease.        Assessment:           Diagnosis Plan   1. Palpitations  ECG 12 Lead   2. Ventricular tachycardia, non-sustained (HCC)     3. History of rheumatic fever as a child     4. Primary osteoarthritis of right hip            Plan:       Palpitations/nonsustained ventricular tachycardia:  · Denies palpitations.  On atenolol    History of rheumatic fever as child:  · Reviewed echocardiogram February 2021.  No significant valvular issues    Primary osteoarthritis of right hip:   · Scheduled for right hip replacement with Dr. Luis on 5/11/2022.  I think she is acceptable risk for this procedure.  She is not having palpitations or any concerning cardiac symptoms.  Her EKG is normal other than being low voltage. I do not think we need any further testing at this time.  I reviewed her echo from February 2021 and she has normal LV function and no significant valvular abnormalities    I think Ms. Guerrier is doing well.  She is no longer having palpitations on atenolol.  This was refilled today.  She denies shortness of breath, chest pain, orthopnea.  I will clear her for hip surgery today and send letter to Dr. Luis's office.  She can follow-up with Dr. Muir in 1 year.      Orders Placed This Encounter   Procedures   • ECG 12 Lead     This order was created via procedure documentation     Order Specific Question:   Release to patient     Answer:   Immediate          RANDA Day  [facility]  04/22/22  11:37 EDT

## 2022-04-27 ENCOUNTER — PRE-ADMISSION TESTING (OUTPATIENT)
Dept: PREADMISSION TESTING | Facility: HOSPITAL | Age: 57
End: 2022-04-27

## 2022-04-27 VITALS
SYSTOLIC BLOOD PRESSURE: 125 MMHG | WEIGHT: 162 LBS | DIASTOLIC BLOOD PRESSURE: 68 MMHG | HEIGHT: 67 IN | TEMPERATURE: 98.2 F | HEART RATE: 71 BPM | RESPIRATION RATE: 16 BRPM | BODY MASS INDEX: 25.43 KG/M2 | OXYGEN SATURATION: 98 %

## 2022-04-27 DIAGNOSIS — M16.11 PRIMARY OSTEOARTHRITIS OF RIGHT HIP: ICD-10-CM

## 2022-04-27 LAB
ABO GROUP BLD: NORMAL
ANION GAP SERPL CALCULATED.3IONS-SCNC: 10.4 MMOL/L (ref 5–15)
BLD GP AB SCN SERPL QL: NEGATIVE
BUN SERPL-MCNC: 19 MG/DL (ref 6–20)
BUN/CREAT SERPL: 27.1 (ref 7–25)
CALCIUM SPEC-SCNC: 9.2 MG/DL (ref 8.6–10.5)
CHLORIDE SERPL-SCNC: 103 MMOL/L (ref 98–107)
CO2 SERPL-SCNC: 23.6 MMOL/L (ref 22–29)
CREAT SERPL-MCNC: 0.7 MG/DL (ref 0.57–1)
DEPRECATED RDW RBC AUTO: 40 FL (ref 37–54)
EGFRCR SERPLBLD CKD-EPI 2021: 101.6 ML/MIN/1.73
ERYTHROCYTE [DISTWIDTH] IN BLOOD BY AUTOMATED COUNT: 12.3 % (ref 12.3–15.4)
GLUCOSE SERPL-MCNC: 104 MG/DL (ref 65–99)
HCT VFR BLD AUTO: 38.1 % (ref 34–46.6)
HGB BLD-MCNC: 12.4 G/DL (ref 12–15.9)
MCH RBC QN AUTO: 29 PG (ref 26.6–33)
MCHC RBC AUTO-ENTMCNC: 32.5 G/DL (ref 31.5–35.7)
MCV RBC AUTO: 89.2 FL (ref 79–97)
PLATELET # BLD AUTO: 239 10*3/MM3 (ref 140–450)
PMV BLD AUTO: 11.2 FL (ref 6–12)
POTASSIUM SERPL-SCNC: 4.3 MMOL/L (ref 3.5–5.2)
RBC # BLD AUTO: 4.27 10*6/MM3 (ref 3.77–5.28)
RH BLD: POSITIVE
SODIUM SERPL-SCNC: 137 MMOL/L (ref 136–145)
T&S EXPIRATION DATE: NORMAL
WBC NRBC COR # BLD: 9.01 10*3/MM3 (ref 3.4–10.8)

## 2022-04-27 PROCEDURE — 86850 RBC ANTIBODY SCREEN: CPT

## 2022-04-27 PROCEDURE — 80048 BASIC METABOLIC PNL TOTAL CA: CPT

## 2022-04-27 PROCEDURE — 86901 BLOOD TYPING SEROLOGIC RH(D): CPT

## 2022-04-27 PROCEDURE — 85027 COMPLETE CBC AUTOMATED: CPT

## 2022-04-27 PROCEDURE — 86900 BLOOD TYPING SEROLOGIC ABO: CPT

## 2022-04-27 PROCEDURE — 36415 COLL VENOUS BLD VENIPUNCTURE: CPT

## 2022-04-27 RX ORDER — CHLORHEXIDINE GLUCONATE 500 MG/1
CLOTH TOPICAL TAKE AS DIRECTED
Status: ACTIVE | OUTPATIENT
Start: 2022-04-27

## 2022-04-27 RX ORDER — CHLORHEXIDINE GLUCONATE 500 MG/1
CLOTH TOPICAL TAKE AS DIRECTED
Status: DISCONTINUED | OUTPATIENT
Start: 2022-04-27 | End: 2022-04-27

## 2022-05-04 ENCOUNTER — OFFICE VISIT (OUTPATIENT)
Dept: ORTHOPEDIC SURGERY | Facility: CLINIC | Age: 57
End: 2022-05-04

## 2022-05-04 VITALS — BODY MASS INDEX: 25.11 KG/M2 | TEMPERATURE: 97 F | WEIGHT: 160 LBS | HEIGHT: 67 IN

## 2022-05-04 DIAGNOSIS — R52 PAIN: ICD-10-CM

## 2022-05-04 DIAGNOSIS — M16.11 PRIMARY OSTEOARTHRITIS OF RIGHT HIP: Primary | ICD-10-CM

## 2022-05-04 PROCEDURE — S0260 H&P FOR SURGERY: HCPCS | Performed by: ORTHOPAEDIC SURGERY

## 2022-05-04 NOTE — H&P
History & Physical       Patient: Sakina HURST    Date of Admission: No admission date for patient encounter.    YOB: 1965    Medical Record Number: [id number]          Chief Complaints: Right hip pain    History of Present Illness: Patient is here for preoperative evaluation for planned right total hip arthroplasty due to symptomatic right hip osteoarthritis which is failed conservative treatments.  Patient states she has received cardiac clearance.  She continues to have right hip pain that has gotten worse and is affecting her normal daily activities.       Allergies: No Known Allergies    Medications:   Home Medications:  No current facility-administered medications on file prior to encounter.     Current Outpatient Medications on File Prior to Encounter   Medication Sig   • cycloSPORINE (RESTASIS) 0.05 % ophthalmic emulsion Administer 1 drop to both eyes Every 12 (Twelve) Hours.   • meloxicam (MOBIC) 15 MG tablet Take 1 tablet by mouth Daily As Needed for Moderate Pain . Take as directed with food. (Patient taking differently: Take 15 mg by mouth Daily. Take as directed with food..TO HOLD 1 WEEK BEFORE SURGERY)     Current Medications:  Scheduled Meds:  Continuous Infusions:No current facility-administered medications for this encounter.    PRN Meds:.    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 GHASSAN COLE   • Left ovarian cyst 08/2006    FOLLICLE CYST   • Lesion of left plantar nerve    • Menopause    • Neuroma of foot 5 years    Surgery to correct   • Onychomycosis 03/18/2014   • Primary insomnia    • Rheumatic fever      AS A CHILD   • Right hip pain    • Syncope 2018    SEEN AT [facility]   • Thrombosed hemorrhoids 2017    SAW DR. FAUZIA GIRON   • Ventricular tachycardia (HCC)         Past Surgical History:   Procedure Laterality Date   • BREAST SURGERY Bilateral 2005    AUGMENTATION MAMMOPLASTY, DR. TONY FERGUSON AT [facility]   •  SECTION N/A    • CHOLECYSTECTOMY N/A     DONE IN [place]   • COLONOSCOPY N/A 2017    ENTIRE COLON WNL, RESCOPE IN 5 YRS, DR. FAUZIA GIRON AT [facility]   • COLPOSCOPY W/ BIOPSY / CURETTAGE N/A 2007    BX OF PERIANAL LESION, PATH: SEVERE DYSPLASIA, CARCINOMA IN SITU, DR.REBECCA MICHELLE AT [facility]   • ENDOMETRIAL ABLATION N/A 2006    WITH EUA, LYSIS OF ADHESIONS, AND ASPIRATION OF LEFT OVARIAN FOLLICLE CYST, DR. TONY MICHELLE AT [facility]   • FOOT SURGERY Left 2020    plate and pen removal    • NEUROMA SURGERY Bilateral 02/10/2016    right third intermetatarsal space; [facility]; Lee James DPM        Social History     Occupational History   • Not on file   Tobacco Use   • Smoking status: Never Smoker   • Smokeless tobacco: Never Used   • Tobacco comment: Caffeine - Yes   Vaping Use   • Vaping Use: Never used   Substance and Sexual Activity   • Alcohol use: No   • Drug use: No   • Sexual activity: Defer      Social History     Social History Narrative   • Not on file        Family History   Problem Relation Age of Onset   • Heart disease Mother    • Cancer Mother    • Hypertension Mother    • Depression Mother    • Colon polyps Mother    • Colon cancer Mother    • Mental illness Mother    • Heart disease Father    • Depression Father    • Mental illness Father    • Cancer Father    • No Known Problems Daughter    • No Known Problems Son    • Malig Hyperthermia Neg Hx        Review of Systems  Negative except as stated above    Physical Exam: 57 y.o. female  General Appearance:    Alert, cooperative, in no acute distress                      There were no vitals  filed for this visit.     Head:    Normocephalic, without obvious abnormality, atraumatic   Eyes:            conjunctivae and sclerae normal, no pallor, corneas clear,                    Lungs:   respirations regular, even and                  unlabored    Heart:    normal rate                               Pulses:   Pulses palpable and equal bilaterally   Skin:   No bleeding, bruising or rash   Lymph nodes:   No palpable adenopathy   Neurologic:   Appears neurologic intact           Right lower extremity: Skin is intact, no tenderness palpation.  Hip range of motion is limited due to discomfort.  Positive Stinchfield.  Motor and sensory is intact distally to light touch.  2+ pedal pulses.      Assessment: Right hip osteoarthritis      Patient Active Problem List   Diagnosis   • Hammer toe   • Insomnia   • Hemorrhoids, external   • Menopause syndrome   • Fear of flying   • Primary osteoarthritis of right hip   • Palpitations   • Ventricular tachycardia, non-sustained (HCC)   • History of rheumatic fever as a child           Plan: Again discussed    Surgical intervention with the patient which would consist of a right total hip arthroplasty.  We discussed the surgery in detail models were used.  We did go over risk and benefits of the procedure.  I did discuss risk and benefits with the patient with risk including but not limited to bleeding, infection, damage nearby nerves, vessels, tendons, ligaments, continued pain, worsening pain, fracture, dislocation, leg length discrepancy, blood clots, even death with anesthesia and possible need for future procedures surgeries.  Patient understood this and has chosen to proceed.      Patient stated that she would be a full code for any resuscitation if required.      Plan to proceed with right total hip arthroplasty.        Interval data may be made to this documentation update as needed.

## 2022-05-04 NOTE — PROGRESS NOTES
History & Physical       Patient: Sakina HURST    Date of Admission: No admission date for patient encounter.    YOB: 1965    Medical Record Number: [id number]          Chief Complaints: Right hip pain    History of Present Illness: Patient is here for preoperative evaluation for planned right total hip arthroplasty due to symptomatic right hip osteoarthritis which is failed conservative treatments.  Patient states she has received cardiac clearance.  She continues to have right hip pain that has gotten worse and is affecting her normal daily activities.       Allergies: No Known Allergies    Medications:   Home Medications:  Current Outpatient Medications on File Prior to Visit   Medication Sig   • atenolol (TENORMIN) 25 MG tablet Take 1 tablet by mouth Daily.   • cycloSPORINE (RESTASIS) 0.05 % ophthalmic emulsion Administer 1 drop to both eyes Every 12 (Twelve) Hours.   • PARoxetine (PAXIL) 30 MG tablet Take 1 tablet by mouth Every Morning.   • zolpidem (AMBIEN) 5 MG tablet Take 1 tablet by mouth At Night As Needed for Sleep.   • Chlorhexidine Gluconate 2 % pads Apply  topically. As directed pre op   • meloxicam (MOBIC) 15 MG tablet Take 1 tablet by mouth Daily As Needed for Moderate Pain . Take as directed with food. (Patient taking differently: Take 15 mg by mouth Daily. Take as directed with food..TO HOLD 1 WEEK BEFORE SURGERY)     Current Facility-Administered Medications on File Prior to Visit   Medication   • Chlorhexidine Gluconate Cloth 2 % pads     Current Medications:  Scheduled Meds:  Continuous Infusions:No current facility-administered medications for this visit.    PRN Meds:.    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 GHASSAN COLE   • Left ovarian cyst 2006    FOLLICLE CYST   • Lesion of left plantar nerve    • Menopause    • Neuroma of foot 5 years    Surgery to correct   • Onychomycosis 2014   • Primary insomnia    • Rheumatic fever     AS A CHILD   • Right hip pain    • Syncope 2018    SEEN AT [facility]   • Thrombosed hemorrhoids 2017    SAW DR. FAUZIA GIRON   • Ventricular tachycardia (HCC)         Past Surgical History:   Procedure Laterality Date   • BREAST SURGERY Bilateral 2005    AUGMENTATION MAMMOPLASTY, DR. TONY FERGUSON AT [facility]   •  SECTION N/A    • CHOLECYSTECTOMY N/A     DONE IN [place]   • COLONOSCOPY N/A 2017    ENTIRE COLON WNL, RESCOPE IN 5 YRS, DR. FAUZIA GIRON AT [facility]   • COLPOSCOPY W/ BIOPSY / CURETTAGE N/A 2007    BX OF PERIANAL LESION, PATH: SEVERE DYSPLASIA, CARCINOMA IN SITU, DR.REBECCA MICHELLE AT [facility]   • ENDOMETRIAL ABLATION N/A 2006    WITH EUA, LYSIS OF ADHESIONS, AND ASPIRATION OF LEFT OVARIAN FOLLICLE CYST, DR. TONY MICHELLE AT [facility]   • FOOT SURGERY Left 2020    plate and pen removal    • NEUROMA SURGERY Bilateral 02/10/2016    right third intermetatarsal space; [facility]; Lee James DPM        Social History     Occupational History   • Not on file   Tobacco Use   • Smoking status: Never Smoker   • Smokeless tobacco: Never Used   • Tobacco comment: Caffeine - Yes   Vaping Use   • Vaping Use: Never used   Substance and Sexual Activity   • Alcohol use: No   • Drug use: No   • Sexual activity: Defer      Social History     Social History Narrative   • Not on file        Family History   Problem Relation Age of Onset   • Heart disease Mother    • Cancer Mother    • Hypertension Mother    • Depression Mother    • Colon polyps Mother    • Colon cancer Mother    • Mental illness Mother    • Heart disease Father    •  "Depression Father    • Mental illness Father    • Cancer Father    • No Known Problems Daughter    • No Known Problems Son    • Malig Hyperthermia Neg Hx        Review of Systems  Negative except as stated above    Physical Exam: 57 y.o. female  General Appearance:    Alert, cooperative, in no acute distress                      Vitals:    05/04/22 1031   Temp: 97 °F (36.1 °C)   Weight: 72.6 kg (160 lb)   Height: 170.2 cm (67\")        Head:    Normocephalic, without obvious abnormality, atraumatic   Eyes:            conjunctivae and sclerae normal, no pallor, corneas clear,                    Lungs:   respirations regular, even and                  unlabored    Heart:    normal rate                               Pulses:   Pulses palpable and equal bilaterally   Skin:   No bleeding, bruising or rash   Lymph nodes:   No palpable adenopathy   Neurologic:   Appears neurologic intact           Right lower extremity: Skin is intact, no tenderness palpation.  Hip range of motion is limited due to discomfort.  Positive Stinchfield.  Motor and sensory is intact distally to light touch.  2+ pedal pulses.      Assessment: Right hip osteoarthritis      Patient Active Problem List   Diagnosis   • Hammer toe   • Insomnia   • Hemorrhoids, external   • Menopause syndrome   • Fear of flying   • Primary osteoarthritis of right hip   • Palpitations   • Ventricular tachycardia, non-sustained (HCC)   • History of rheumatic fever as a child           Plan: Again discussed    Surgical intervention with the patient which would consist of a right total hip arthroplasty.  We discussed the surgery in detail models were used.  We did go over risk and benefits of the procedure.  I did discuss risk and benefits with the patient with risk including but not limited to bleeding, infection, damage nearby nerves, vessels, tendons, ligaments, continued pain, worsening pain, fracture, dislocation, leg length discrepancy, blood clots, even death with " anesthesia and possible need for future procedures surgeries.  Patient understood this and has chosen to proceed.      Patient stated that she would be a full code for any resuscitation if required.      Plan to proceed with right total hip arthroplasty.

## 2022-05-10 ENCOUNTER — APPOINTMENT (OUTPATIENT)
Dept: LAB | Facility: HOSPITAL | Age: 57
End: 2022-05-10

## 2022-05-10 ENCOUNTER — LAB (OUTPATIENT)
Dept: LAB | Facility: HOSPITAL | Age: 57
End: 2022-05-10

## 2022-05-10 DIAGNOSIS — Z20.822 ENCOUNTER FOR PREOPERATIVE SCREENING LABORATORY TESTING FOR COVID-19 VIRUS: Primary | ICD-10-CM

## 2022-05-10 DIAGNOSIS — Z01.812 ENCOUNTER FOR PREOPERATIVE SCREENING LABORATORY TESTING FOR COVID-19 VIRUS: Primary | ICD-10-CM

## 2022-05-10 LAB — SARS-COV-2 ORF1AB RESP QL NAA+PROBE: NOT DETECTED

## 2022-05-10 PROCEDURE — C9803 HOPD COVID-19 SPEC COLLECT: HCPCS

## 2022-05-10 PROCEDURE — U0004 COV-19 TEST NON-CDC HGH THRU: HCPCS

## 2022-05-11 ENCOUNTER — ANESTHESIA (OUTPATIENT)
Dept: PERIOP | Facility: HOSPITAL | Age: 57
End: 2022-05-11

## 2022-05-11 ENCOUNTER — HOSPITAL ENCOUNTER (OUTPATIENT)
Facility: HOSPITAL | Age: 57
Discharge: HOME | End: 2022-05-12
Attending: ORTHOPAEDIC SURGERY | Admitting: ORTHOPAEDIC SURGERY

## 2022-05-11 ENCOUNTER — APPOINTMENT (OUTPATIENT)
Dept: GENERAL RADIOLOGY | Facility: HOSPITAL | Age: 57
End: 2022-05-11

## 2022-05-11 ENCOUNTER — ANESTHESIA EVENT (OUTPATIENT)
Dept: PERIOP | Facility: HOSPITAL | Age: 57
End: 2022-05-11

## 2022-05-11 DIAGNOSIS — M16.11 PRIMARY OSTEOARTHRITIS OF RIGHT HIP: Primary | ICD-10-CM

## 2022-05-11 PROCEDURE — G0378 HOSPITAL OBSERVATION PER HR: HCPCS

## 2022-05-11 PROCEDURE — 25010000002 EPINEPHRINE 1 MG/ML SOLUTION 30 ML VIAL: Performed by: ORTHOPAEDIC SURGERY

## 2022-05-11 PROCEDURE — 25010000002 FENTANYL CITRATE (PF) 50 MCG/ML SOLUTION: Performed by: ANESTHESIOLOGY

## 2022-05-11 PROCEDURE — C1776 JOINT DEVICE (IMPLANTABLE): HCPCS | Performed by: ORTHOPAEDIC SURGERY

## 2022-05-11 PROCEDURE — 25010000002 PHENYLEPHRINE 10 MG/ML SOLUTION: Performed by: STUDENT IN AN ORGANIZED HEALTH CARE EDUCATION/TRAINING PROGRAM

## 2022-05-11 PROCEDURE — 25010000002 DEXAMETHASONE PER 1 MG: Performed by: ANESTHESIOLOGY

## 2022-05-11 PROCEDURE — 25010000002 KETOROLAC TROMETHAMINE PER 15 MG: Performed by: ORTHOPAEDIC SURGERY

## 2022-05-11 PROCEDURE — 73501 X-RAY EXAM HIP UNI 1 VIEW: CPT | Performed by: ORTHOPAEDIC SURGERY

## 2022-05-11 PROCEDURE — 25010000002 VANCOMYCIN PER 500 MG: Performed by: ORTHOPAEDIC SURGERY

## 2022-05-11 PROCEDURE — 25010000002 MIDAZOLAM PER 1 MG: Performed by: ANESTHESIOLOGY

## 2022-05-11 PROCEDURE — 27130 TOTAL HIP ARTHROPLASTY: CPT | Performed by: ORTHOPAEDIC SURGERY

## 2022-05-11 PROCEDURE — 25010000002 CLONIDINE PER 1 MG: Performed by: ORTHOPAEDIC SURGERY

## 2022-05-11 PROCEDURE — 25010000002 HYDROMORPHONE PER 4 MG: Performed by: ANESTHESIOLOGY

## 2022-05-11 PROCEDURE — 25010000002 NEOSTIGMINE 5 MG/10ML SOLUTION: Performed by: STUDENT IN AN ORGANIZED HEALTH CARE EDUCATION/TRAINING PROGRAM

## 2022-05-11 PROCEDURE — 25010000002 ROPIVACAINE PER 1 MG: Performed by: ORTHOPAEDIC SURGERY

## 2022-05-11 PROCEDURE — 25010000002 CEFAZOLIN IN DEXTROSE 2-4 GM/100ML-% SOLUTION: Performed by: ORTHOPAEDIC SURGERY

## 2022-05-11 PROCEDURE — 25010000002 PROPOFOL 10 MG/ML EMULSION: Performed by: ANESTHESIOLOGY

## 2022-05-11 PROCEDURE — 73501 X-RAY EXAM HIP UNI 1 VIEW: CPT

## 2022-05-11 PROCEDURE — 76000 FLUOROSCOPY <1 HR PHYS/QHP: CPT | Performed by: ORTHOPAEDIC SURGERY

## 2022-05-11 DEVICE — SCRW ACET CORT TRILOGY S/TAP 6.5X35: Type: IMPLANTABLE DEVICE | Site: HIP | Status: FUNCTIONAL

## 2022-05-11 DEVICE — CP HIP UPCHRG OSSEOTI LTD HL CUPS: Type: IMPLANTABLE DEVICE | Site: HIP | Status: FUNCTIONAL

## 2022-05-11 DEVICE — TOTAL HIP PRIMARY: Type: IMPLANTABLE DEVICE | Site: HIP | Status: FUNCTIONAL

## 2022-05-11 DEVICE — SHLL ACET OSSEOTI G7 3H SZE 52MM: Type: IMPLANTABLE DEVICE | Site: HIP | Status: FUNCTIONAL

## 2022-05-11 DEVICE — KNOTLESS TISSUE CONTROL DEVICE, UNDYED UNIDIRECTIONAL (ANTIBACTERIAL) SYNTHETIC ABSORBABLE DEVICE
Type: IMPLANTABLE DEVICE | Site: HIP | Status: FUNCTIONAL
Brand: STRATAFIX

## 2022-05-11 DEVICE — IMPLANTABLE DEVICE
Type: IMPLANTABLE DEVICE | Site: HIP | Status: FUNCTIONAL
Brand: G7® VIVACIT-E®

## 2022-05-11 DEVICE — BIOLOX® DELTA, CERAMIC FEMORAL HEAD, S, Ø 36/-3.5, TAPER 12/14
Type: IMPLANTABLE DEVICE | Site: HIP | Status: FUNCTIONAL
Brand: BIOLOX® DELTA

## 2022-05-11 DEVICE — STEM FEM/HIP AVENIRCOMPLETE COLAR STFF HA SZ3: Type: IMPLANTABLE DEVICE | Site: HIP | Status: FUNCTIONAL

## 2022-05-11 RX ORDER — MELOXICAM 15 MG/1
15 TABLET ORAL DAILY
Status: DISCONTINUED | OUTPATIENT
Start: 2022-05-12 | End: 2022-05-12 | Stop reason: HOSPADM

## 2022-05-11 RX ORDER — CEFAZOLIN SODIUM 2 G/100ML
2 INJECTION, SOLUTION INTRAVENOUS EVERY 8 HOURS
Status: COMPLETED | OUTPATIENT
Start: 2022-05-11 | End: 2022-05-12

## 2022-05-11 RX ORDER — POVIDONE-IODINE 10 MG/ML
SOLUTION TOPICAL ONCE
Status: COMPLETED | OUTPATIENT
Start: 2022-05-11 | End: 2022-05-11

## 2022-05-11 RX ORDER — HYDROCODONE BITARTRATE AND ACETAMINOPHEN 7.5; 325 MG/1; MG/1
2 TABLET ORAL EVERY 4 HOURS PRN
Status: DISCONTINUED | OUTPATIENT
Start: 2022-05-11 | End: 2022-05-12 | Stop reason: HOSPADM

## 2022-05-11 RX ORDER — EPHEDRINE SULFATE 50 MG/ML
INJECTION, SOLUTION INTRAVENOUS AS NEEDED
Status: DISCONTINUED | OUTPATIENT
Start: 2022-05-11 | End: 2022-05-11 | Stop reason: SURG

## 2022-05-11 RX ORDER — SODIUM CHLORIDE, SODIUM LACTATE, POTASSIUM CHLORIDE, CALCIUM CHLORIDE 600; 310; 30; 20 MG/100ML; MG/100ML; MG/100ML; MG/100ML
100 INJECTION, SOLUTION INTRAVENOUS CONTINUOUS
Status: DISCONTINUED | OUTPATIENT
Start: 2022-05-11 | End: 2022-05-12 | Stop reason: HOSPADM

## 2022-05-11 RX ORDER — PROPOFOL 10 MG/ML
VIAL (ML) INTRAVENOUS AS NEEDED
Status: DISCONTINUED | OUTPATIENT
Start: 2022-05-11 | End: 2022-05-11 | Stop reason: SURG

## 2022-05-11 RX ORDER — MELOXICAM 15 MG/1
15 TABLET ORAL ONCE
Status: COMPLETED | OUTPATIENT
Start: 2022-05-11 | End: 2022-05-11

## 2022-05-11 RX ORDER — LIDOCAINE HYDROCHLORIDE 20 MG/ML
INJECTION, SOLUTION INFILTRATION; PERINEURAL AS NEEDED
Status: DISCONTINUED | OUTPATIENT
Start: 2022-05-11 | End: 2022-05-11 | Stop reason: SURG

## 2022-05-11 RX ORDER — ONDANSETRON 2 MG/ML
4 INJECTION INTRAMUSCULAR; INTRAVENOUS ONCE AS NEEDED
Status: DISCONTINUED | OUTPATIENT
Start: 2022-05-11 | End: 2022-05-11 | Stop reason: HOSPADM

## 2022-05-11 RX ORDER — VANCOMYCIN HYDROCHLORIDE 1 G/200ML
15 INJECTION, SOLUTION INTRAVENOUS ONCE
Status: COMPLETED | OUTPATIENT
Start: 2022-05-11 | End: 2022-05-11

## 2022-05-11 RX ORDER — POLYETHYLENE GLYCOL 3350 17 G/17G
17 POWDER, FOR SOLUTION ORAL 2 TIMES DAILY
Status: DISCONTINUED | OUTPATIENT
Start: 2022-05-11 | End: 2022-05-12 | Stop reason: HOSPADM

## 2022-05-11 RX ORDER — ASPIRIN 81 MG/1
81 TABLET ORAL EVERY 12 HOURS SCHEDULED
Status: DISCONTINUED | OUTPATIENT
Start: 2022-05-12 | End: 2022-05-12 | Stop reason: HOSPADM

## 2022-05-11 RX ORDER — MAGNESIUM HYDROXIDE 1200 MG/15ML
LIQUID ORAL AS NEEDED
Status: DISCONTINUED | OUTPATIENT
Start: 2022-05-11 | End: 2022-05-11 | Stop reason: HOSPADM

## 2022-05-11 RX ORDER — GLYCOPYRROLATE 0.2 MG/ML
INJECTION INTRAMUSCULAR; INTRAVENOUS AS NEEDED
Status: DISCONTINUED | OUTPATIENT
Start: 2022-05-11 | End: 2022-05-11 | Stop reason: SURG

## 2022-05-11 RX ORDER — BISACODYL 10 MG
10 SUPPOSITORY, RECTAL RECTAL DAILY PRN
Status: DISCONTINUED | OUTPATIENT
Start: 2022-05-11 | End: 2022-05-12 | Stop reason: HOSPADM

## 2022-05-11 RX ORDER — LABETALOL HYDROCHLORIDE 5 MG/ML
5 INJECTION, SOLUTION INTRAVENOUS
Status: DISCONTINUED | OUTPATIENT
Start: 2022-05-11 | End: 2022-05-11 | Stop reason: HOSPADM

## 2022-05-11 RX ORDER — PROMETHAZINE HYDROCHLORIDE 25 MG/1
25 TABLET ORAL ONCE AS NEEDED
Status: DISCONTINUED | OUTPATIENT
Start: 2022-05-11 | End: 2022-05-11 | Stop reason: HOSPADM

## 2022-05-11 RX ORDER — ONDANSETRON 4 MG/1
4 TABLET, FILM COATED ORAL EVERY 6 HOURS PRN
Status: DISCONTINUED | OUTPATIENT
Start: 2022-05-11 | End: 2022-05-12 | Stop reason: HOSPADM

## 2022-05-11 RX ORDER — LIDOCAINE HYDROCHLORIDE 10 MG/ML
0.5 INJECTION, SOLUTION EPIDURAL; INFILTRATION; INTRACAUDAL; PERINEURAL ONCE AS NEEDED
Status: DISCONTINUED | OUTPATIENT
Start: 2022-05-11 | End: 2022-05-11 | Stop reason: HOSPADM

## 2022-05-11 RX ORDER — CEFAZOLIN SODIUM 2 G/100ML
2 INJECTION, SOLUTION INTRAVENOUS ONCE
Status: COMPLETED | OUTPATIENT
Start: 2022-05-11 | End: 2022-05-11

## 2022-05-11 RX ORDER — HYDROMORPHONE HYDROCHLORIDE 1 MG/ML
0.5 INJECTION, SOLUTION INTRAMUSCULAR; INTRAVENOUS; SUBCUTANEOUS
Status: DISCONTINUED | OUTPATIENT
Start: 2022-05-11 | End: 2022-05-11 | Stop reason: HOSPADM

## 2022-05-11 RX ORDER — OXYCODONE AND ACETAMINOPHEN 7.5; 325 MG/1; MG/1
1 TABLET ORAL EVERY 4 HOURS PRN
Status: DISCONTINUED | OUTPATIENT
Start: 2022-05-11 | End: 2022-05-11 | Stop reason: HOSPADM

## 2022-05-11 RX ORDER — IBUPROFEN 600 MG/1
600 TABLET ORAL ONCE AS NEEDED
Status: DISCONTINUED | OUTPATIENT
Start: 2022-05-11 | End: 2022-05-11 | Stop reason: HOSPADM

## 2022-05-11 RX ORDER — ACETAMINOPHEN 325 MG/1
1000 TABLET ORAL ONCE
Status: COMPLETED | OUTPATIENT
Start: 2022-05-11 | End: 2022-05-11

## 2022-05-11 RX ORDER — SODIUM CHLORIDE 0.9 % (FLUSH) 0.9 %
10 SYRINGE (ML) INJECTION EVERY 12 HOURS SCHEDULED
Status: DISCONTINUED | OUTPATIENT
Start: 2022-05-11 | End: 2022-05-11 | Stop reason: HOSPADM

## 2022-05-11 RX ORDER — HYDROCODONE BITARTRATE AND ACETAMINOPHEN 7.5; 325 MG/1; MG/1
1 TABLET ORAL EVERY 4 HOURS PRN
Status: DISCONTINUED | OUTPATIENT
Start: 2022-05-11 | End: 2022-05-12 | Stop reason: HOSPADM

## 2022-05-11 RX ORDER — EPHEDRINE SULFATE 50 MG/ML
5 INJECTION, SOLUTION INTRAVENOUS ONCE AS NEEDED
Status: DISCONTINUED | OUTPATIENT
Start: 2022-05-11 | End: 2022-05-11 | Stop reason: HOSPADM

## 2022-05-11 RX ORDER — LIDOCAINE HYDROCHLORIDE 40 MG/ML
SOLUTION TOPICAL AS NEEDED
Status: DISCONTINUED | OUTPATIENT
Start: 2022-05-11 | End: 2022-05-11 | Stop reason: SURG

## 2022-05-11 RX ORDER — FENTANYL CITRATE 50 UG/ML
INJECTION, SOLUTION INTRAMUSCULAR; INTRAVENOUS AS NEEDED
Status: DISCONTINUED | OUTPATIENT
Start: 2022-05-11 | End: 2022-05-11 | Stop reason: SURG

## 2022-05-11 RX ORDER — CYCLOSPORINE 0.5 MG/ML
1 EMULSION OPHTHALMIC EVERY 12 HOURS SCHEDULED
Status: DISCONTINUED | OUTPATIENT
Start: 2022-05-11 | End: 2022-05-12 | Stop reason: HOSPADM

## 2022-05-11 RX ORDER — DEXAMETHASONE SODIUM PHOSPHATE 10 MG/ML
INJECTION INTRAMUSCULAR; INTRAVENOUS AS NEEDED
Status: DISCONTINUED | OUTPATIENT
Start: 2022-05-11 | End: 2022-05-11 | Stop reason: SURG

## 2022-05-11 RX ORDER — KETOROLAC TROMETHAMINE 30 MG/ML
15 INJECTION, SOLUTION INTRAMUSCULAR; INTRAVENOUS EVERY 6 HOURS PRN
Status: DISCONTINUED | OUTPATIENT
Start: 2022-05-11 | End: 2022-05-12 | Stop reason: HOSPADM

## 2022-05-11 RX ORDER — MIDAZOLAM HYDROCHLORIDE 1 MG/ML
1 INJECTION INTRAMUSCULAR; INTRAVENOUS
Status: DISCONTINUED | OUTPATIENT
Start: 2022-05-11 | End: 2022-05-11 | Stop reason: HOSPADM

## 2022-05-11 RX ORDER — TRANEXAMIC ACID 100 MG/ML
INJECTION, SOLUTION INTRAVENOUS AS NEEDED
Status: DISCONTINUED | OUTPATIENT
Start: 2022-05-11 | End: 2022-05-11 | Stop reason: SURG

## 2022-05-11 RX ORDER — PROMETHAZINE HYDROCHLORIDE 25 MG/1
25 SUPPOSITORY RECTAL ONCE AS NEEDED
Status: DISCONTINUED | OUTPATIENT
Start: 2022-05-11 | End: 2022-05-11 | Stop reason: HOSPADM

## 2022-05-11 RX ORDER — PHENYLEPHRINE HYDROCHLORIDE 10 MG/ML
INJECTION INTRAVENOUS AS NEEDED
Status: DISCONTINUED | OUTPATIENT
Start: 2022-05-11 | End: 2022-05-11 | Stop reason: SURG

## 2022-05-11 RX ORDER — HYDRALAZINE HYDROCHLORIDE 20 MG/ML
5 INJECTION INTRAMUSCULAR; INTRAVENOUS
Status: DISCONTINUED | OUTPATIENT
Start: 2022-05-11 | End: 2022-05-11 | Stop reason: HOSPADM

## 2022-05-11 RX ORDER — FLUMAZENIL 0.1 MG/ML
0.2 INJECTION INTRAVENOUS AS NEEDED
Status: DISCONTINUED | OUTPATIENT
Start: 2022-05-11 | End: 2022-05-11 | Stop reason: HOSPADM

## 2022-05-11 RX ORDER — PAROXETINE 30 MG/1
30 TABLET, FILM COATED ORAL DAILY
Status: DISCONTINUED | OUTPATIENT
Start: 2022-05-12 | End: 2022-05-12 | Stop reason: HOSPADM

## 2022-05-11 RX ORDER — DOCUSATE SODIUM 100 MG/1
100 CAPSULE, LIQUID FILLED ORAL 2 TIMES DAILY
Status: DISCONTINUED | OUTPATIENT
Start: 2022-05-11 | End: 2022-05-12 | Stop reason: HOSPADM

## 2022-05-11 RX ORDER — HYDROCODONE BITARTRATE AND ACETAMINOPHEN 7.5; 325 MG/1; MG/1
1 TABLET ORAL ONCE AS NEEDED
Status: DISCONTINUED | OUTPATIENT
Start: 2022-05-11 | End: 2022-05-11 | Stop reason: HOSPADM

## 2022-05-11 RX ORDER — DIPHENHYDRAMINE HCL 25 MG
25 CAPSULE ORAL
Status: DISCONTINUED | OUTPATIENT
Start: 2022-05-11 | End: 2022-05-11 | Stop reason: HOSPADM

## 2022-05-11 RX ORDER — ONDANSETRON 2 MG/ML
4 INJECTION INTRAMUSCULAR; INTRAVENOUS EVERY 6 HOURS PRN
Status: DISCONTINUED | OUTPATIENT
Start: 2022-05-11 | End: 2022-05-12 | Stop reason: HOSPADM

## 2022-05-11 RX ORDER — NALOXONE HCL 0.4 MG/ML
0.2 VIAL (ML) INJECTION AS NEEDED
Status: DISCONTINUED | OUTPATIENT
Start: 2022-05-11 | End: 2022-05-11 | Stop reason: HOSPADM

## 2022-05-11 RX ORDER — NEOSTIGMINE METHYLSULFATE 0.5 MG/ML
INJECTION, SOLUTION INTRAVENOUS AS NEEDED
Status: DISCONTINUED | OUTPATIENT
Start: 2022-05-11 | End: 2022-05-11 | Stop reason: SURG

## 2022-05-11 RX ORDER — ACETAMINOPHEN 325 MG/1
325 TABLET ORAL EVERY 4 HOURS PRN
Status: DISCONTINUED | OUTPATIENT
Start: 2022-05-11 | End: 2022-05-12 | Stop reason: HOSPADM

## 2022-05-11 RX ORDER — FENTANYL CITRATE 50 UG/ML
50 INJECTION, SOLUTION INTRAMUSCULAR; INTRAVENOUS
Status: DISCONTINUED | OUTPATIENT
Start: 2022-05-11 | End: 2022-05-11 | Stop reason: HOSPADM

## 2022-05-11 RX ORDER — ATENOLOL 25 MG/1
25 TABLET ORAL NIGHTLY
Status: DISCONTINUED | OUTPATIENT
Start: 2022-05-11 | End: 2022-05-12 | Stop reason: HOSPADM

## 2022-05-11 RX ORDER — PREGABALIN 75 MG/1
150 CAPSULE ORAL ONCE
Status: COMPLETED | OUTPATIENT
Start: 2022-05-11 | End: 2022-05-11

## 2022-05-11 RX ORDER — DIPHENHYDRAMINE HYDROCHLORIDE 50 MG/ML
12.5 INJECTION INTRAMUSCULAR; INTRAVENOUS
Status: DISCONTINUED | OUTPATIENT
Start: 2022-05-11 | End: 2022-05-11 | Stop reason: HOSPADM

## 2022-05-11 RX ORDER — SODIUM CHLORIDE 0.9 % (FLUSH) 0.9 %
10 SYRINGE (ML) INJECTION AS NEEDED
Status: DISCONTINUED | OUTPATIENT
Start: 2022-05-11 | End: 2022-05-11 | Stop reason: HOSPADM

## 2022-05-11 RX ORDER — SODIUM CHLORIDE, SODIUM LACTATE, POTASSIUM CHLORIDE, CALCIUM CHLORIDE 600; 310; 30; 20 MG/100ML; MG/100ML; MG/100ML; MG/100ML
9 INJECTION, SOLUTION INTRAVENOUS CONTINUOUS PRN
Status: DISCONTINUED | OUTPATIENT
Start: 2022-05-11 | End: 2022-05-12 | Stop reason: HOSPADM

## 2022-05-11 RX ORDER — ROCURONIUM BROMIDE 10 MG/ML
INJECTION, SOLUTION INTRAVENOUS AS NEEDED
Status: DISCONTINUED | OUTPATIENT
Start: 2022-05-11 | End: 2022-05-11 | Stop reason: SURG

## 2022-05-11 RX ADMIN — CEFAZOLIN SODIUM 2 G: 2 INJECTION, SOLUTION INTRAVENOUS at 20:16

## 2022-05-11 RX ADMIN — GLYCOPYRROLATE 0.4 MG: 0.2 INJECTION INTRAMUSCULAR; INTRAVENOUS at 14:26

## 2022-05-11 RX ADMIN — EPHEDRINE SULFATE 10 MG: 50 INJECTION INTRAVENOUS at 12:22

## 2022-05-11 RX ADMIN — ATENOLOL 25 MG: 25 TABLET ORAL at 22:06

## 2022-05-11 RX ADMIN — LIDOCAINE HYDROCHLORIDE 80 MG: 20 INJECTION, SOLUTION INFILTRATION; PERINEURAL at 12:08

## 2022-05-11 RX ADMIN — HYDROMORPHONE HYDROCHLORIDE 0.5 MG: 1 INJECTION, SOLUTION INTRAMUSCULAR; INTRAVENOUS; SUBCUTANEOUS at 16:02

## 2022-05-11 RX ADMIN — MIDAZOLAM 1 MG: 1 INJECTION INTRAMUSCULAR; INTRAVENOUS at 11:11

## 2022-05-11 RX ADMIN — FENTANYL CITRATE 50 MCG: 0.05 INJECTION, SOLUTION INTRAMUSCULAR; INTRAVENOUS at 12:08

## 2022-05-11 RX ADMIN — PHENYLEPHRINE HYDROCHLORIDE 100 MCG: 10 INJECTION, SOLUTION INTRAVENOUS at 14:18

## 2022-05-11 RX ADMIN — SODIUM CHLORIDE, POTASSIUM CHLORIDE, SODIUM LACTATE AND CALCIUM CHLORIDE 9 ML/HR: 600; 310; 30; 20 INJECTION, SOLUTION INTRAVENOUS at 11:54

## 2022-05-11 RX ADMIN — SODIUM CHLORIDE, POTASSIUM CHLORIDE, SODIUM LACTATE AND CALCIUM CHLORIDE: 600; 310; 30; 20 INJECTION, SOLUTION INTRAVENOUS at 13:55

## 2022-05-11 RX ADMIN — TRANEXAMIC ACID 1000 MG: 1 INJECTION, SOLUTION INTRAVENOUS at 12:29

## 2022-05-11 RX ADMIN — MELOXICAM 15 MG: 15 TABLET ORAL at 10:55

## 2022-05-11 RX ADMIN — ROCURONIUM BROMIDE 40 MG: 50 INJECTION INTRAVENOUS at 12:08

## 2022-05-11 RX ADMIN — EPHEDRINE SULFATE 10 MG: 50 INJECTION INTRAVENOUS at 12:37

## 2022-05-11 RX ADMIN — FENTANYL CITRATE 50 MCG: 0.05 INJECTION, SOLUTION INTRAMUSCULAR; INTRAVENOUS at 15:31

## 2022-05-11 RX ADMIN — TRANEXAMIC ACID 1000 MG: 1 INJECTION, SOLUTION INTRAVENOUS at 14:33

## 2022-05-11 RX ADMIN — HYDROCODONE BITARTRATE AND ACETAMINOPHEN 1 TABLET: 7.5; 325 TABLET ORAL at 20:16

## 2022-05-11 RX ADMIN — POVIDONE-IODINE 2 APPLICATION: 10 SWAB TOPICAL at 10:55

## 2022-05-11 RX ADMIN — SODIUM CHLORIDE, POTASSIUM CHLORIDE, SODIUM LACTATE AND CALCIUM CHLORIDE 500 ML: 600; 310; 30; 20 INJECTION, SOLUTION INTRAVENOUS at 11:06

## 2022-05-11 RX ADMIN — NEOSTIGMINE METHYLSULFATE 3.5 MG: 0.5 INJECTION INTRAVENOUS at 14:26

## 2022-05-11 RX ADMIN — PREGABALIN 150 MG: 75 CAPSULE ORAL at 10:55

## 2022-05-11 RX ADMIN — ACETAMINOPHEN 975 MG: 325 TABLET ORAL at 10:55

## 2022-05-11 RX ADMIN — FENTANYL CITRATE 50 MCG: 0.05 INJECTION, SOLUTION INTRAMUSCULAR; INTRAVENOUS at 12:43

## 2022-05-11 RX ADMIN — CEFAZOLIN SODIUM 2 G: 2 INJECTION, SOLUTION INTRAVENOUS at 11:54

## 2022-05-11 RX ADMIN — LIDOCAINE HYDROCHLORIDE 1 EACH: 40 SOLUTION TOPICAL at 12:12

## 2022-05-11 RX ADMIN — FENTANYL CITRATE 50 MCG: 0.05 INJECTION, SOLUTION INTRAMUSCULAR; INTRAVENOUS at 13:50

## 2022-05-11 RX ADMIN — DEXAMETHASONE SODIUM PHOSPHATE 8 MG: 10 INJECTION INTRAMUSCULAR; INTRAVENOUS at 12:26

## 2022-05-11 RX ADMIN — PROPOFOL 150 MG: 10 INJECTION, EMULSION INTRAVENOUS at 12:08

## 2022-05-11 RX ADMIN — VANCOMYCIN HYDROCHLORIDE 1000 MG: 1 INJECTION, SOLUTION INTRAVENOUS at 10:41

## 2022-05-11 NOTE — OP NOTE
Operative Report        Facility:   Patient Name: Sakina HURST  YOB: 1965  Date: 5/11/2022  Medical Record Number: [id number]       Preoperative diagnosis: right  Hip Osteoarthritis     Postoperative diagnosis:  right Hip Osteoarthritis     Surgery performed: right Total hip arthroplasty     Implants:    Implant Name Type Inv. Item Serial No.  Lot No. LRB No. Used Action   DEV CONTRL TISS STRATAFIX SPIRAL MNCRYL UD 3/0 PLS 30CM - [id number] Implant DEV CONTRL TISS STRATAFIX SPIRAL MNCRYL UD 3/0 PLS 30CM  [id number] Right 1 Implanted   SHLL ACET OSSEOTI G7 3H SPRING 52MM - [id number] Implant SHLL ACET OSSEOTI G7 3H SPRING 52MM  [employer] [id number] Right 1 Implanted   SCRW ACET HEENA TRILOGY S/TAP 6.5X35 - [id number] Implant SCRW ACET HEENA TRILOGY S/TAP 6.5X35  [employer] [id number] Right 1 Implanted   LINER ACET G7 NTRL E1 SPRING 36MM - [id number] Implant LINER ACET G7 NTRL E1 SPRING 36MM  [employer] [id number] Right 1 Implanted   STEM FEM/HIP AVENIRCOMPLETE COLAR STFF MCKENZIE SZ3 - [id number] Implant STEM FEM/HIP AVENIRCOMPLETE COLAR STFF MCKENZIE SZ3  [employer] [id number] Right 1 Implanted   HD FEM/HIP BIOLOX/DELTA CERAM 12/[id number] MIN3.5MM - [id number] Implant HD FEM/HIP BIOLOX/DELTA CERAM 12/[id number] MIN3.5MM  [employer] [id number] Right 1 Implanted           Surgeon: Maciel Luis MD      Assistant: Cristina King     Assistants were needed for the procedure to help with patient positioning, prepping and draping, retraction throughout the procedure and closure at the end of the case. Their assistance was vital to the success of this case.      Anesthesia: General     Estimated blood loss: 100 cc     Drains: None     Complications: None    Specimens: None     Disposition: The patient will be transferred back to the [facility] floor postoperatively and will be weightbearing as tolerated.  The patient will be started on aspirin for DVT prophylaxis  and will mobilize with physical therapy likely tomorrow.  Once medically stable patient will be discharged home or to an outpatient facility per the primary team and family.     Indications for procedure: This is a pleasant 56 yo female with longstanding hip pain due to severe osteoarthritis.  Patient is failed conservative management including anti-inflammatories, physical therapy, activity modification, and gait assistance.  Patient wishes to proceed with a right total hip arthroplasty.  I discussed the risks in detail preoperatively risk  including but are not limited to bleeding, infection, fracture, dislocation, blood clots, damage nearby nerves or blood vessels, death, loosening, leg length inequality, infection from a blood transfusion, pneumonia, heart attack, stroke, liver or kidney failure, possible future procedures surgeries the patient  wish to proceed with surgery all questions were answered.     Description of procedure:     After identification in the holding area, consent was signed and the right hip was marked.  The patient was brought to the operating theater.  After induction of anesthesia, the patient received preoperative antibiotics and tranexamic acid.  The patient was placed in a [place] table in a supine position and the right lower extremity was prepped and draped free.  A timeout was performed identifying correct patient, surgical site, surgical procedure, antibiotics given and implants available.  Standard anterior approach to the hip was performed.  I used a clean knife to incise the tensor fascia.  I coagulated the circumflex vessels with the Aquamantys.  I retracted the rectus muscle medially coming down to the anterior capsule.  An inverted T-capsulotomy was made and the capsule was incised, tagged and saved for later repair.  I then continued with a femoral neck osteotomy with a sagittal saw which was made based upon preoperative templating and intraoperative measurements for leg  length.  A corkscrew was inserted into the femoral head and the head was removed which showed to have severe arthritic changes and deformity of the head itself.  The head ball measured about 47 mm.  Attention was then turned to the acetabulum which was that exposed.  The foveal and labral contents were excised.  The C-arm was brought in in order to ream under x-ray guidance to ensure appropriate depth and position.  The acetabulum was reamed with hemispherical reamers to size 51 mm.  This brought me down to good bleeding cancellous bone. The acetabulum was copiously irrigated normal sterile saline to remove any remaining debris.    A real 52 mm cup was impacted under fluoroscopic guidance in 40 degrees of abduction and 20 degrees of forward flexion/anteversion.  Good press-fit was achieved. 1 Dome screw were placed and position was verified using C arm.  The cup was copiously irrigated with normal sterile saline.  The final neutral liner for a 36 mm head ball was then impacted into place.  Attention was then turned back to the femur.  The hook was placed on the lateral femur.  The leg was externally rotated, extended and adducted.  The piriformis  capsule was released off the femur and the femur was elevated into the wound.  I used a rongeur to remove some lateral cortical neck bone.  I then lateralized with a rasp.  I then began sequentially broaching  up to a size 3 stem.  This brought me down to good cortical contact with rotational stability.  Calcar planar was used as needed.  I then trialed with a standard neck and a -3.5  head ball.  The hip was reduced.  Fluoroscopy confirmed good size/fill, position of the femoral stem, with near equal offset and leg lengths.  I felt we were about 3 mm long.  The hip was dislocated.  I then rebroached with a size 3 was able to sync it down to approximately 3 mm.  The stem was stable.  Trial components were removed.  The final size 3 stem and 36 mm -3.5 head ball were  impacted.  The hip was relocated.  Final x-rays were taken and showed excellent implant position, leg lengths, offset and no intraoperative fracture.  I checked stability by externally rotating to 90 degrees and placed a bone hook around the neck of the stem and the hip cannot be dislocated.  The hip was copiously irrigated with dilute Betadine and pulse lavage with normal sterile saline.  The capsule was then closed with #2 Ethibond suture.  I injected the anterior capsule with about 10 mL of the hip ortho cocktail mix. The circumflex vessels were recoagulated with the Aquamantys. Excellent hemostasis was maintained.  Deep fascia was closed using a running 0 Vicryl suture.  Additional hip Ortho cocktail mix was administered and deep subcutaneous tissues were closed using 2-0 Vicryl and 3-0 strata fix was used for the skin.  Dermabond was placed on the incision.  Sterile dressings were applied prior to drapes being removed.   All sponge, needles, and instrument counts were correct at the conclusion of the procedure. The patient tolerated procedure well and was transferred from the operating room to the PACU vital signs stable.

## 2022-05-11 NOTE — ANESTHESIA POSTPROCEDURE EVALUATION
"Patient: Sakina HURST    Procedure Summary     Date: 05/11/22 Room / Location: [facility] OR [address] [place] [address] MAIN OR    Anesthesia Start: 1200 Anesthesia Stop: 1452    Procedure: Right anterior total hip arthroplasty (Right Hip) Diagnosis:       Primary osteoarthritis of right hip      (Primary osteoarthritis of right hip [M16.11])    Surgeons: Maciel Luis MD Provider: Garcia Ingram MD    Anesthesia Type: general ASA Status: 2          Anesthesia Type: general    Vitals  Vitals Value Taken Time   /55 05/11/22 1546   Temp 37.2 °C (98.9 °F) 05/11/22 1450   Pulse 77 05/11/22 1549   Resp 20 05/11/22 1530   SpO2 96 % 05/11/22 1549   Vitals shown include unvalidated device data.        Post Anesthesia Care and Evaluation    Patient location during evaluation: bedside  Patient participation: complete - patient participated  Level of consciousness: awake  Pain management: adequate  Airway patency: patent  Anesthetic complications: No anesthetic complications    Cardiovascular status: acceptable  Respiratory status: acceptable  Hydration status: acceptable    Comments: /55   Pulse 68   Temp 37.2 °C (98.9 °F) (Oral)   Resp 20   Ht 170.2 cm (67\")   Wt 71.5 kg (157 lb 10.1 oz)   [facility] 11/26/2016   SpO2 95%   BMI 24.69 kg/m²       " PT WOULD LIKE TO SCHEDULE MAMMOGRAM, PLEASE PLACE ORDER    THANK YOU

## 2022-05-11 NOTE — ANESTHESIA PROCEDURE NOTES
Airway  Urgency: elective    Date/Time: 5/11/2022 12:12 PM  Airway not difficult    General Information and Staff    Patient location during procedure: OR  Anesthesiologist: Sami Young MD    Indications and Patient Condition  Indications for airway management: airway protection    Preoxygenated: yes  MILS maintained throughout  Mask difficulty assessment: 1 - vent by mask    Final Airway Details  Final airway type: endotracheal airway      Successful airway: ETT  Cuffed: yes   Successful intubation technique: direct laryngoscopy  Endotracheal tube insertion site: oral  Blade: Zuleyka  Blade size: 3  ETT size (mm): 7.0  Cormack-Lehane Classification: grade IIa - partial view of glottis  Placement verified by: chest auscultation and capnometry   Measured from: lips  ETT/EBT  to lips (cm): 21  Number of attempts at approach: 2  Assessment: lips, teeth, and gum same as pre-op and atraumatic intubation    Additional Comments  Light a bit dim

## 2022-05-11 NOTE — ANESTHESIA PREPROCEDURE EVALUATION
Anesthesia Evaluation     Patient summary reviewed and Nursing notes reviewed   no history of anesthetic complications:  NPO Solid Status: > 8 hours  NPO Liquid Status: > 2 hours           Airway   Mallampati: II  TM distance: >3 FB  Neck ROM: full  no difficulty expected  Dental - normal exam     Pulmonary - negative pulmonary ROS and normal exam    breath sounds clear to auscultation  Cardiovascular - normal exam    Rhythm: regular  Rate: normal    (+) dysrhythmias Tachycardia,       Neuro/Psych- negative ROS  GI/Hepatic/Renal/Endo    (+)   thyroid problem     Musculoskeletal (-) negative ROS    Abdominal  - normal exam   Substance History - negative use     OB/GYN negative ob/gyn ROS         Other - negative ROS                         Anesthesia Plan    ASA 2     general     intravenous induction     Anesthetic plan, all risks, benefits, and alternatives have been provided, discussed and informed consent has been obtained with: patient.

## 2022-05-12 ENCOUNTER — HOME HEALTH ADMISSION (OUTPATIENT)
Dept: HOME HEALTH SERVICES | Facility: HOME HEALTHCARE | Age: 57
End: 2022-05-12

## 2022-05-12 VITALS
OXYGEN SATURATION: 92 % | SYSTOLIC BLOOD PRESSURE: 106 MMHG | HEART RATE: 71 BPM | BODY MASS INDEX: 24.74 KG/M2 | RESPIRATION RATE: 16 BRPM | DIASTOLIC BLOOD PRESSURE: 68 MMHG | HEIGHT: 67 IN | TEMPERATURE: 97.7 F | WEIGHT: 157.63 LBS

## 2022-05-12 LAB
HCT VFR BLD AUTO: 32.4 % (ref 34–46.6)
HGB BLD-MCNC: 10.5 G/DL (ref 12–15.9)

## 2022-05-12 PROCEDURE — 85014 HEMATOCRIT: CPT | Performed by: ORTHOPAEDIC SURGERY

## 2022-05-12 PROCEDURE — 97110 THERAPEUTIC EXERCISES: CPT

## 2022-05-12 PROCEDURE — 99024 POSTOP FOLLOW-UP VISIT: CPT | Performed by: ORTHOPAEDIC SURGERY

## 2022-05-12 PROCEDURE — 97161 PT EVAL LOW COMPLEX 20 MIN: CPT

## 2022-05-12 PROCEDURE — G0378 HOSPITAL OBSERVATION PER HR: HCPCS

## 2022-05-12 PROCEDURE — 25010000002 CEFAZOLIN IN DEXTROSE 2-4 GM/100ML-% SOLUTION: Performed by: ORTHOPAEDIC SURGERY

## 2022-05-12 PROCEDURE — 85018 HEMOGLOBIN: CPT | Performed by: ORTHOPAEDIC SURGERY

## 2022-05-12 RX ORDER — POLYETHYLENE GLYCOL 3350 17 G/17G
17 POWDER, FOR SOLUTION ORAL DAILY
Qty: 238 G | Refills: 0 | Status: SHIPPED | OUTPATIENT
Start: 2022-05-12 | End: 2022-05-26

## 2022-05-12 RX ORDER — ONDANSETRON 4 MG/1
4 TABLET, FILM COATED ORAL EVERY 6 HOURS PRN
Qty: 10 TABLET | Refills: 0 | Status: SHIPPED | OUTPATIENT
Start: 2022-05-12 | End: 2022-10-21

## 2022-05-12 RX ORDER — MELOXICAM 15 MG/1
15 TABLET ORAL DAILY
Qty: 14 TABLET | Refills: 0 | Status: SHIPPED | OUTPATIENT
Start: 2022-05-12 | End: 2022-05-26

## 2022-05-12 RX ORDER — PSEUDOEPHEDRINE HCL 30 MG
100 TABLET ORAL 2 TIMES DAILY
Qty: 60 CAPSULE | Refills: 0 | Status: SHIPPED | OUTPATIENT
Start: 2022-05-12 | End: 2022-10-21

## 2022-05-12 RX ORDER — ASPIRIN 81 MG/1
TABLET ORAL
Qty: 60 TABLET | Refills: 0 | Status: SHIPPED | OUTPATIENT
Start: 2022-05-12 | End: 2022-10-21

## 2022-05-12 RX ORDER — HYDROCODONE BITARTRATE AND ACETAMINOPHEN 7.5; 325 MG/1; MG/1
TABLET ORAL
Qty: 42 TABLET | Refills: 0 | Status: SHIPPED | OUTPATIENT
Start: 2022-05-12 | End: 2022-05-25 | Stop reason: SDUPTHER

## 2022-05-12 RX ADMIN — DOCUSATE SODIUM 100 MG: 100 CAPSULE, LIQUID FILLED ORAL at 08:54

## 2022-05-12 RX ADMIN — PAROXETINE HYDROCHLORIDE HEMIHYDRATE 30 MG: 30 TABLET, FILM COATED ORAL at 08:54

## 2022-05-12 RX ADMIN — HYDROCODONE BITARTRATE AND ACETAMINOPHEN 1 TABLET: 7.5; 325 TABLET ORAL at 00:39

## 2022-05-12 RX ADMIN — ASPIRIN 81 MG: 81 TABLET, COATED ORAL at 08:54

## 2022-05-12 RX ADMIN — HYDROCODONE BITARTRATE AND ACETAMINOPHEN 1 TABLET: 7.5; 325 TABLET ORAL at 08:53

## 2022-05-12 RX ADMIN — CEFAZOLIN SODIUM 2 G: 2 INJECTION, SOLUTION INTRAVENOUS at 03:56

## 2022-05-12 RX ADMIN — POLYETHYLENE GLYCOL 3350 17 G: 17 POWDER, FOR SOLUTION ORAL at 08:54

## 2022-05-12 RX ADMIN — CYCLOSPORINE 1 DROP: 0.5 EMULSION OPHTHALMIC at 08:54

## 2022-05-12 RX ADMIN — HYDROCODONE BITARTRATE AND ACETAMINOPHEN 1 TABLET: 7.5; 325 TABLET ORAL at 04:36

## 2022-05-12 RX ADMIN — MELOXICAM 15 MG: 15 TABLET ORAL at 08:54

## 2022-05-12 NOTE — PROGRESS NOTES
Orthopedic Progress Note      Patient: Sakina HURST  Date of Admission: 5/11/2022  YOB: 1965  Medical Record Number: [id number]    POD # :  1 Day Post-Op Procedure(s) (LRB):  Right anterior total hip arthroplasty (Right)    Patient seen and examined this morning.  She was tolerating her oral diet and pain is controlled.  States that she has been up walking in her hip pain is much improved.  No complaints.    Physical Exam:  57 y.o.  female  Vitals:  Temp:  [97.2 °F (36.2 °C)-98.9 °F (37.2 °C)] 97.7 °F (36.5 °C)  Heart Rate:  [63-85] 71  Resp:  [16-20] 16  BP: ()/(54-68) 95/60  alert and oriented    Ext: NV intact. ROM appropriate. Calf is soft and nontender. Negative Homans Sn.  2+ pedal pulses  Skin: Incision clean dry and intact w/out signs or  symptoms of infection.    Activity: Mobilizing Per P.T.   Weight Bearing: As Tolerated    Data Review     Admission on 05/11/2022   Component Date Value Ref Range Status   • Hemoglobin 05/12/2022 10.5 (A) 12.0 - 15.9 g/dL Final   • Hematocrit 05/12/2022 32.4 (A) 34.0 - 46.6 % Final       No results found.    Medications:  aspirin, 81 mg, Oral, Q12H  atenolol, 25 mg, Oral, Nightly  cycloSPORINE, 1 drop, Both Eyes, Q12H  docusate sodium, 100 mg, Oral, BID  meloxicam, 15 mg, Oral, Daily  PARoxetine, 30 mg, Oral, Daily  polyethylene glycol, 17 g, Oral, BID      •  acetaminophen  •  bisacodyl  •  HYDROcodone-acetaminophen  •  HYDROcodone-acetaminophen  •  ketorolac  •  lactated ringers  •  magnesium hydroxide  •  ondansetron **OR** ondansetron      Imaging: Postoperative imaging shows status post right total hip arthroplasty with implant in satisfactory position no acute complications noted.      Assessment:  Doing well POD  # 1 Day Post-Op Procedure(s) (LRB):  Right anterior total hip arthroplasty (Right)  Problems Addressed this Visit        Musculoskeletal and Injuries    * (Principal) Primary osteoarthritis of right hip - Primary    Relevant Medications     povidone-iodine 10 % swab (Completed)    lactated ringers bolus 500 mL (Completed)    ceFAZolin in dextrose (ANCEF) IVPB solution 2 g (Completed)    vancomycin (VANCOCIN) in iso-osmotic dextrose IVPB 1 g (premix) 200 mL (Completed)    acetaminophen (TYLENOL) tablet 975 mg (Completed)    meloxicam (MOBIC) tablet 15 mg (Completed)    pregabalin (LYRICA) capsule 150 mg (Completed)    HYDROcodone-acetaminophen (NORCO) 7.5-325 MG per tablet    Other Relevant Orders    Commode Chair    Walker    Ambulatory Referral to Home Health      Diagnoses       Codes Comments    Primary osteoarthritis of right hip    -  Primary ICD-10-CM: M16.11  ICD-9-CM: 715.15         Labs include hemoglobin stable.  Vital signs been stable.    Plan:  Anterior hip precautions  Continue efforts to mobilize  Continue Pain Control Measures  Continue incisional Care  DVT prophylaxis    Discharge Plan:Home today     Discharge instructions have been placed.  Please call with any questions or concerns thank you    Maciel Luis MD    Date: 5/12/2022  Time: 08:01 EDT

## 2022-05-12 NOTE — PLAN OF CARE
Goal Outcome Evaluation:  Plan of Care Reviewed With: patient        Progress: improving  Outcome Evaluation: Pt remains stable. Will be evaluated by physical therapy today, ambulated in fatima with walker use and 1 assist. Voiding per BRP. KIA is cdi. Pain well controlled. No comorbidities to discuss. Plans to dc home today. LONNY.

## 2022-05-12 NOTE — CONSULTS
Patient Name:  Sakina HURST  YOB: 1965  MRN:  [id number]  Date of Admission:  5/11/2022  Date of Consult:  5/11/2022  Patient Care Team:  Peter Muir MD as PCP - General (Cardiology)  Mckenzie Durand MD as Consulting Physician (Obstetrics and Gynecology)    Inpatient Hospitalist Consult  Consult performed by: Satnam Wood MD  Consult ordered by: Maciel Luis MD  Reason for consult: Evaluate status and make recommendations regarding treatment for history of NSVT        Subjective   History of Present Illness  Ms. HURST is a 57 y.o. female that has been admitted to [facility] following elective Right anterior total hip arthroplasty.  She has been admitted to an orthopedic floor following surgery and we were asked to see and assist with her medical problems.  At the time of my visit she denies any chest pain, SOA, nausea, vomiting or diarrhea.  She has tolerated a diet.  She does complain of expected postoperative discomfort.  She reports being in a normal state of health leading up to surgery.      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 GHASSAN COLE   • Left ovarian cyst 08/2006    FOLLICLE CYST   • Lesion of left plantar nerve    • Menopause    • Neuroma of foot 5 years    Surgery to correct   • Onychomycosis 03/18/2014   • Primary insomnia    • Rheumatic fever     AS A CHILD   • Right hip pain    • Syncope 02/07/2018    SEEN AT [facility]   • Thrombosed hemorrhoids 09/26/2017    SAW DR. FAUZIA GIRON   • Ventricular tachycardia (HCC)      Past Surgical History:   Procedure Laterality Date   •  BREAST SURGERY Bilateral 2005    AUGMENTATION MAMMOPLASTY, DR. TONY FERGUSON AT [facility]   •  SECTION N/A    • CHOLECYSTECTOMY N/A 2016    DONE IN [place]   • COLONOSCOPY N/A 2017    ENTIRE COLON WNL, RESCOPE IN 5 YRS, DR. FAUZIA GIRON AT [facility]   • COLPOSCOPY W/ BIOPSY / CURETTAGE N/A 2007    BX OF PERIANAL LESION, PATH: SEVERE DYSPLASIA, CARCINOMA IN SITU, DR.REBECCA MICHELLE AT [facility]   • ENDOMETRIAL ABLATION N/A 2006    WITH EUA, LYSIS OF ADHESIONS, AND ASPIRATION OF LEFT OVARIAN FOLLICLE CYST, DR. TONY MICHELLE AT [facility]   • FOOT SURGERY Left 2020    plate and pen removal    • NEUROMA SURGERY Bilateral 02/10/2016    right third intermetatarsal space; [facility]; Lee James DPM     Family History   Problem Relation Age of Onset   • Heart disease Mother    • Cancer Mother    • Hypertension Mother    • Depression Mother    • Colon polyps Mother    • Colon cancer Mother    • Mental illness Mother    • Heart disease Father    • Depression Father    • Mental illness Father    • Cancer Father    • No Known Problems Daughter    • No Known Problems Son    • Malig Hyperthermia Neg Hx      Social History     Tobacco Use   • Smoking status: Never Smoker   • Smokeless tobacco: Never Used   • Tobacco comment: Caffeine - Yes   Vaping Use   • Vaping Use: Never used   Substance Use Topics   • Alcohol use: No   • Drug use: No     Medications Prior to Admission   Medication Sig Dispense Refill Last Dose   • atenolol (TENORMIN) 25 MG tablet Take 1 tablet by mouth Daily. 90 tablet 3 5/10/2022 at 2100   • Chlorhexidine Gluconate 2 % pads Apply  topically. As directed pre op   2022 at 0900   • cycloSPORINE (RESTASIS) 0.05 % ophthalmic emulsion Administer 1 drop to both eyes Every 12 (Twelve) Hours.   2022 at 0800   • PARoxetine (PAXIL) 30 MG tablet Take 1 tablet by mouth Every Morning. 90 tablet 1 2022 at 0800   • meloxicam (MOBIC) 15 MG tablet Take 1 tablet by mouth Daily As Needed for  Moderate Pain . Take as directed with food. (Patient taking differently: Take 15 mg by mouth Daily. Take as directed with food..TO HOLD 1 WEEK BEFORE SURGERY) 30 tablet 2 5/4/2022   • vitamin D3 125 MCG (5000 UT) capsule capsule Take 2,000 Units by mouth Daily.   4/27/2022   • zolpidem (AMBIEN) 5 MG tablet Take 1 tablet by mouth At Night As Needed for Sleep. 30 tablet 2 5/4/2022     Allergies:  Patient has no known allergies.    Review of Systems   Constitutional: Negative.    HENT: Negative.    Respiratory: Negative.    Cardiovascular: Negative.    Gastrointestinal: Negative.    Endocrine: Negative.    Genitourinary: Negative.    Musculoskeletal: Negative.         Expected postoperative pain   Skin: Negative.    Neurological: Negative.    Hematological: Negative.    Psychiatric/Behavioral: Negative.        Objective      Vital Signs  Temp:  [97.2 °F (36.2 °C)-98.9 °F (37.2 °C)] 97.2 °F (36.2 °C)  Heart Rate:  [63-85] 85  Resp:  [18-20] 20  BP: ()/(54-64) 93/58  Body mass index is 24.69 kg/m².    Physical Exam  Constitutional:       General: She is not in acute distress.     Appearance: Normal appearance. She is not toxic-appearing.   HENT:      Head: Normocephalic and atraumatic.      Nose: Nose normal.   Eyes:      General: No scleral icterus.     Extraocular Movements: Extraocular movements intact.      Conjunctiva/sclera: Conjunctivae normal.   Cardiovascular:      Rate and Rhythm: Normal rate and regular rhythm.   Pulmonary:      Effort: Pulmonary effort is normal.      Breath sounds: Normal breath sounds.   Abdominal:      General: Bowel sounds are normal.      Palpations: Abdomen is soft.      Tenderness: There is no abdominal tenderness.   Musculoskeletal:         General: Tenderness present.      Cervical back: Normal range of motion and neck supple.      Comments: Surgical wound dressed, ROM not tested   Skin:     General: Skin is warm and dry.      Findings: No rash.   Neurological:      Mental  Status: She is alert and oriented to person, place, and time. Mental status is at baseline.   Psychiatric:         Mood and Affect: Mood normal.         Thought Content: Thought content normal.         Results Review:   I reviewed the patient's new clinical results.  I reviewed the patient's new imaging results and agree with the interpretation.  I reviewed the patient's other test results and agree with the interpretation         Assessment/Plan     Active Hospital Problems    Diagnosis  POA   • **Primary osteoarthritis of right hip [M16.11]  Unknown   • Anxiety [F41.9]  Unknown   • Ventricular tachycardia, non-sustained (HCC) [I47.2]  Yes      Resolved Hospital Problems   No resolved problems to display.       Ms. HURST is a 57 y.o. female who is s/p Right anterior total hip arthroplasty.    · She seems to be doing very well thus far postoperatively.   · Most recent BP is 92/55.  She is on a low-dose atenolol for palpitations and history and SVT.  She generally takes it in the evenings and last took it last night.  Will reorder for this evening and monitor her BP closely.  · She takes Paxil for anxiety which will be reinitiated.      Thank you very much for asking A to be involved in this patient's care.  She seems very stable and will see again upon request.      Satnam Wood MD  [facility]  05/11/22  20:04 EDT

## 2022-05-12 NOTE — PLAN OF CARE
Goal Outcome Evaluation:  Plan of Care Reviewed With: patient, daughter        Progress: improving  Outcome Evaluation: Pt seen for PT this am. She is s/p R anerior THR and presents w expected post op pain and weakness. Pt doing well, ready to go home. She was able to tolerate all hip exercises without difficulty. She ambulated approx 180 ft w Rwx and SBA. reviewed anterior hip precautions. Pt verbalizes understanding. She is safe to DC home from PT standpoint.

## 2022-05-12 NOTE — DISCHARGE SUMMARY
Patient Name: Sakina HURST  Patient YOB: 1965    Date of Admission:  5/11/2022  Date of Discharge:  5/12/2022  Discharge Diagnosis: MD TOTAL HIP ARTHROPLASTY [[id number]] (Right anterior total hip arthroplasty)  Presenting Problem/History of Present Illness: Primary osteoarthritis of right hip [M16.11]  Admitting Physician: Dr Maciel Luis  Consults:   Consults     Date and Time Order Name Status Description    5/11/2022  5:03 PM Inpatient Hospitalist Consult Completed           DETAILS OF HOSPITAL STAY:  Patient is a 57 y.o. female was admitted to the floor following the above procedure and underwent an uncomplicated hospital stay.  Patient did well with physical therapy and was ambulating well without problems.  On the day of discharge the wound was clean, dry and intact and calf was soft and nontender and Homans sign was negative.  Patient was tolerating  without problems.  Patient will be discharged home.    Condition on Discharge:  Stable    Vital Signs  Temp:  [97.2 °F (36.2 °C)-98.9 °F (37.2 °C)] 97.7 °F (36.5 °C)  Heart Rate:  [63-85] 71  Resp:  [16-20] 16  BP: ()/(54-68) 106/68    LABS:   Admission on 05/11/2022   Component Date Value Ref Range Status   • Hemoglobin 05/12/2022 10.5 (A) 12.0 - 15.9 g/dL Final   • Hematocrit 05/12/2022 32.4 (A) 34.0 - 46.6 % Final   Lab on 05/10/2022   Component Date Value Ref Range Status   • COVID19 05/10/2022 Not Detected  Not Detected - Ref. Range Final       No results found.        Discharge Medications     Discharge Medications      New Medications      Instructions Start Date   Aspirin Low Dose 81 MG EC tablet  Generic drug: aspirin   Take 1 tablet by mouth every 12 hours for 2 weeks, then 1 tablet daily for 4 weeks      docusate sodium 100 MG capsule  Commonly known as: COLACE   100 mg, Oral, 2 Times Daily      HYDROcodone-acetaminophen 7.5-325 MG per tablet  Commonly known as: NORCO   Take 1 to 2 tablets by mouth every 4 hours as needed for pain       ondansetron 4 MG tablet  Commonly known as: ZOFRAN   4 mg, Oral, Every 6 Hours PRN      polyethylene glycol 17 GM/SCOOP powder  Commonly known as: MIRALAX   Mix one capful (17gm) in liquid and take by mouth Daily for 7 days.         Changes to Medications      Instructions Start Date   meloxicam 15 MG tablet  Commonly known as: MOBIC  What changed:   · when to take this  · reasons to take this  · additional instructions   Take 1 tablet by mouth Daily for 14 doses.         Continue These Medications      Instructions Start Date   atenolol 25 MG tablet  Commonly known as: TENORMIN   25 mg, Oral, Daily      cycloSPORINE 0.05 % ophthalmic emulsion  Commonly known as: RESTASIS   1 drop, Both Eyes, Every 12 Hours      PARoxetine 30 MG tablet  Commonly known as: PAXIL   30 mg, Oral, Every Morning      vitamin D3 125 MCG (5000 UT) capsule capsule   2,000 Units, Oral, Daily      zolpidem 5 MG tablet  Commonly known as: AMBIEN   5 mg, Oral, Nightly PRN         Stop These Medications    Chlorhexidine Gluconate 2 % pads            Activity at Discharge:     Discharge Instructions:   1)  Patient is to continue with physical therapy exercises daily and continue working with the physical therapist as ordered.  2)  Follow Anterior hip precautions.   3)  Patient may weight bear as tolerated.   4)  Apply ice regularly. You may ice for long periods of time as long as ice is not directly on the skin. Patient instructed on frequent calf pumping exercises.  Patient also instructed on incentive spirometer during hospitalization and encouraged to continue to use at home regularly.   5)  The dressing should be left in place. If waterproof dressing is intact the patient may shower immediately following discharge. If the dressing becomes disloged or saturated it should be changed. Please refer to the [facility] information sheet if you have any questions about the dressing.  If you have a home health nurse or therapist they can be contacted to  assist with dressing change or repair. You may also call the [facility] dressing hotline for questions related to the dressing ([phone]). If there are still other problems or questions related to the dressing despite these measures then you can contact Brenna at our office [phone].   6)  Follow up appointment in 2 weeks - patient to call the office at [phone] to schedule. 7)  Patient will be discharged on aspirin 81 mg twice daily for 2 weeks followed by daily for 4 weeks    Complete Discharge Diagnosis:    Patient Active Problem List   Diagnosis   • Hammer toe   • Insomnia   • Hemorrhoids, external   • Menopause syndrome   • Fear of flying   • Primary osteoarthritis of right hip   • Palpitations   • Ventricular tachycardia, non-sustained (HCC)   • History of rheumatic fever as a child   • Anxiety           Follow-up Appointments  Future Appointments   Date Time Provider Department Center   5/25/2022 10:20 AM Maciel Luis MD [facility] [address] [place]              Maciel Luis MD  05/12/22  11:34 EDT

## 2022-05-12 NOTE — PLAN OF CARE
See below.    Problem: Adult Inpatient Plan of Care  Goal: Plan of Care Review  Outcome: Met  Flowsheets  Taken 5/12/2022 1131 by Rocky Santiago, RN  Progress: improving  Outcome Evaluation: 57/F POD#1 right NICOLASA.  ALOx4, RA, lungs clear but diminished, BS audible and normoactive, voiding independently.  Up standby BRP with walker/gait belt.  2+ pedal pulses noted bilaterally, no c/o numbness/tingling in operative extremity, dressing CDI.  Pain well-controlled with PO pain meds only.  PIV removed.  D/C home with HH today.  Taken 5/12/2022 1125 by Amy Mendoza, PT  Plan of Care Reviewed With:   patient   daughter

## 2022-05-12 NOTE — THERAPY EVALUATION
Patient Name: Sakina HURST  : 1965    MRN: [id number]                              Today's Date: 2022       Admit Date: 2022    Visit Dx:     ICD-10-CM ICD-9-CM   1. 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 GHASSAN COLE   • Left ovarian cyst 2006    FOLLICLE CYST   • Lesion of left plantar nerve    • Menopause    • Neuroma of foot 5 years    Surgery to correct   • Onychomycosis 2014   • Primary insomnia    • Rheumatic fever     AS A CHILD   • Right hip pain    • Syncope 2018    SEEN AT [facility]   • Thrombosed hemorrhoids 2017    SAW DR. FAUZIA GIRON   • Ventricular tachycardia (HCC)      Past Surgical History:   Procedure Laterality Date   • BREAST SURGERY Bilateral 2005    AUGMENTATION MAMMOPLASTY, DR. TONY FERGUSON AT [facility]   •  SECTION N/A    • CHOLECYSTECTOMY N/A     DONE IN [place]   • COLONOSCOPY N/A 2017    ENTIRE COLON WNL, RESCOPE IN 5 YRS, DR. FAUZIA GIRON AT [facility]   • COLPOSCOPY W/ BIOPSY / CURETTAGE N/A 2007    BX OF PERIANAL LESION, PATH: SEVERE DYSPLASIA, CARCINOMA IN SITU, DR.REBECCA MICHELLE AT [facility]   • ENDOMETRIAL ABLATION N/A 2006    WITH EUA, LYSIS OF ADHESIONS, AND ASPIRATION OF LEFT OVARIAN FOLLICLE  DR. TONY PAPPAS AT [facility]   • FOOT SURGERY Left 2020    plate and pen removal    • NEUROMA SURGERY Bilateral 02/10/2016    right third intermetatarsal space; [facility]; Lee James DPM   • TOTAL HIP ARTHROPLASTY Right 5/11/2022    Procedure: Right anterior total hip arthroplasty;  Surgeon: Maciel Luis MD;  Location: [facility] MAIN OR;  Service: Orthopedics;  Laterality: Right;      General Information     Row Name 05/12/22 1124          Physical Therapy Time and Intention    Document Type evaluation  -EJ     Mode of Treatment physical therapy  -EJ     Row Name 05/12/22 1124          General Information    Patient Profile Reviewed yes  -EJ     Prior Level of Function independent:;ADL's;all household mobility;community mobility  -EJ     Existing Precautions/Restrictions hip, anterior;right  -EJ     Barriers to Rehab none identified  -EJ     Row Name 05/12/22 1124          Living Environment    People in Home child(solange), adult  -EJ     Row Name 05/12/22 1124          Home Main Entrance    Number of Stairs, Main Entrance one  -EJ     Row Name 05/12/22 1124          Stairs Within Home, Primary    Number of Stairs, Within Home, Primary none  -EJ     Row Name 05/12/22 1124          Cognition    Orientation Status (Cognition) oriented x 3  -EJ     Row Name 05/12/22 1124          Safety Issues, Functional Mobility    Impairments Affecting Function (Mobility) pain;strength  -EJ           User Key  (r) = Recorded By, (t) = Taken By, (c) = Cosigned By    Initials Name Provider Type    EJ Amy Mendoza, PT Physical Therapist               Mobility     Row Name 05/12/22 1125          Bed Mobility    Bed Mobility supine-sit;sit-supine  -EJ     Supine-Sit [place] (Bed Mobility) standby assist  -EJ     Sit-Supine [place] (Bed Mobility) standby assist  -EJ     Assistive Device (Bed Mobility) head of bed elevated;bed rails  -EJ     Row Name 05/12/22 1125          Sit-Stand Transfer    Sit-Stand  [place] (Transfers) verbal cues;standby assist  -EJ     Assistive Device (Sit-Stand Transfers) walker, front-wheeled  -EJ     Row Name 05/12/22 1125          Gait/Stairs (Locomotion)    [place] Level (Gait) verbal cues;standby assist  -EJ     Assistive Device (Gait) walker, front-wheeled  -EJ     Distance in Feet (Gait) 180  -EJ     Deviations/Abnormal Patterns (Gait) brielle decreased;stride length decreased;antalgic  -EJ           User Key  (r) = Recorded By, (t) = Taken By, (c) = Cosigned By    Initials Name Provider Type    Amy Castaneda, PT Physical Therapist               Obj/Interventions     Row Name 05/12/22 1125          Range of Motion Comprehensive    General Range of Motion no range of motion deficits identified  -EJ     Comment, General Range of Motion x R hip  -EJ     Row Name 05/12/22 1125          Strength Comprehensive (MMT)    Comment, General Manual Muscle Testing (MMT) Assessment post op weakness  -EJ     Row Name 05/12/22 1125          Motor Skills    Therapeutic Exercise --  R THR protocol x 10 reps  -EJ           User Key  (r) = Recorded By, (t) = Taken By, (c) = Cosigned By    Initials Name Provider Type    Amy Castaneda, PT Physical Therapist               Goals/Plan    No documentation.                Clinical Impression     Row Name 05/12/22 1125          Pain    Pretreatment Pain Rating 5/10  -EJ     Posttreatment Pain Rating 5/10  -EJ     Pain Location - Side/Orientation Right  -EJ     Pain Location - hip  -EJ     Pain Intervention(s) Repositioned;Ambulation/increased activity  -EJ     Row Name 05/12/22 1125          Plan of Care Review    Plan of Care Reviewed With patient;daughter  -EJ     Progress improving  -EJ     Outcome Evaluation Pt seen for PT this am. She is s/p R anerior THR and presents w expected post op pain and weakness. Pt doing well, ready to go home. She was able to tolerate all hip exercises without difficulty. She ambulated approx 180 ft w  Rwx and SBA. reviewed anterior hip precautions. Pt verbalizes understanding. She is safe to DC home from PT standpoint.  -     Row Name 05/12/22 1125          Therapy Assessment/Plan (PT)    Patient/Family Therapy Goals Statement (PT) go home  -EJ     Therapy Frequency (PT) evaluation only  -     Row Name 05/12/22 1125          Positioning and Restraints    Pre-Treatment Position in bed  -EJ     Post Treatment Position bathroom  -EJ     Bathroom notified nsg;sitting;call light within reach;encouraged to call for assist;with family/caregiver  -EJ           User Key  (r) = Recorded By, (t) = Taken By, (c) = Cosigned By    Initials Name Provider Type    Amy Castaneda, PT Physical Therapist               Outcome Measures     Row Name 05/12/22 1127 05/12/22 0727       How much help from another person do you currently need...    Turning from your back to your side while in flat bed without using bedrails? 4  -EJ 4  -SOL    Moving from lying on back to sitting on the side of a flat bed without bedrails? 3  -EJ 3  -SOL    Moving to and from a bed to a chair (including a wheelchair)? 3  -EJ 3  -SOL    Standing up from a chair using your arms (e.g., wheelchair, bedside chair)? 3  -EJ 3  -SOL    Climbing 3-5 steps with a railing? 3  -EJ 3  -SOL    To walk in hospital room? 3  -EJ 3  -SOL    AM-PAC 6 Clicks Score (PT) 19  -EJ 19  -SOL    Highest level of mobility 6 --> Walked 10 steps or more  -EJ 6 --> Walked 10 steps or more  -SOL          User Key  (r) = Recorded By, (t) = Taken By, (c) = Cosigned By    Initials Name Provider Type    Amy Castaneda, PT Physical Therapist    Rocky Mason RN Registered Nurse                               PT Recommendation and Plan     Plan of Care Reviewed With: patient, daughter  Progress: improving  Outcome Evaluation: Pt seen for PT this am. She is s/p R anerior THR and presents w expected post op pain and weakness. Pt doing well, ready to go home. She was able to tolerate  all hip exercises without difficulty. She ambulated approx 180 ft w Rwx and SBA. reviewed anterior hip precautions. Pt verbalizes understanding. She is safe to DC home from PT standpoint.     Time Calculation:    PT Charges     Row Name 05/12/22 1128             Time Calculation    Start Time 1109  -EJ      Stop Time 1124  -EJ      Time Calculation (min) 15 min  -EJ      PT Received On 05/12/22  -EJ              Time Calculation- PT    Total Timed Code Minutes- PT 10 minute(s)  -EJ            User Key  (r) = Recorded By, (t) = Taken By, (c) = Cosigned By    Initials Name Provider Type    EJ Amy Mendoza, PT Physical Therapist              Therapy Charges for Today     Code Description Service Date Service Provider Modifiers Qty    57164293472 HC PT EVAL LOW COMPLEXITY 2 5/12/2022 Amy Mendoza, PT GP 1    43137059177 HC PT THER PROC EA 15 MIN 5/12/2022 Amy Mendoza, PT GP 1          PT G-Codes  AM-PAC 6 Clicks Score (PT): 19    Amy Mendoza, PT  5/12/2022

## 2022-05-12 NOTE — PROGRESS NOTES
Patient is discharging today . Face sheet information is correct and patient is agreeable to home health . Orders in [facility] for home health PT . Thank you !

## 2022-05-12 NOTE — PROGRESS NOTES
Continued Stay Note  [facility]     Patient Name: Sakina HURST  MRN: [id number]  Today's Date: 5/12/2022    Admit Date: 5/11/2022     Discharge Plan     Row Name 05/12/22 1222       Plan    Plan [facility]    Patient/Family in Agreement with Plan yes    Plan Comments Spoke with pt, verified correct information on facesheet and explained the role of CCP. Pt would like to d/c home with [facility], referral sent in Epic to [facility]. Plan will be to d/c home with [facility] and family support. No other needs identified.    Final Discharge Disposition Code 06 - home with home health care    Final Note Pt d/c'ed home with [facility]               Discharge Codes    No documentation.               Expected Discharge Date and Time     Expected Discharge Date Expected Discharge Time    May 12, 2022             An Anthony RN

## 2022-05-12 NOTE — DISCHARGE PLACEMENT REQUEST
"Darek HURST (57 y.o. Female)             Date of Birth   1965    Social Security Number       Address   [address] [place] [address]    Home Phone   [phone]    MRN   [id number]       Anabaptism   None    Marital Status   Single                            Admission Date   5/11/22    Admission Type   Elective    Admitting Provider   Maciel Luis MD    Attending Provider   Maciel Luis MD    Department, Room/Bed   [address], P776/1       Discharge Date       Discharge Disposition   Home or Self Care    Discharge Destination                               Attending Provider: Maciel Luis MD    Allergies: No Known Allergies    Isolation: None   Infection: None   Code Status: Not on file   Advance Care Planning Activity    Ht: 170.2 cm (67\")   Wt: 71.5 kg (157 lb 10.1 oz)    Admission Cmt: None   Principal Problem: Primary osteoarthritis of right hip [M16.11]                 Active Insurance as of 5/11/2022     Primary Coverage     Payor Plan Insurance Group Employer/Plan Group    ANTHEM MEDICAID ANTH MEDICAID [id number]     Payor Plan Address Payor Plan Phone Number Payor Plan Fax Number Effective Dates    [address] [address] [phone]  1/1/2022 - None Entered    [facility] [address]       Subscriber Name Subscriber Birth Date Member ID       DAREK HURST 1965 [id number]                 Emergency Contacts      (Rel.) Home Phone Work Phone Mobile Phone    DANIEL CHADWICK (Daughter) [phone] -- --          "

## 2022-05-13 ENCOUNTER — HOME CARE VISIT (OUTPATIENT)
Dept: HOME HEALTH SERVICES | Facility: HOME HEALTHCARE | Age: 57
End: 2022-05-13

## 2022-05-13 VITALS
DIASTOLIC BLOOD PRESSURE: 58 MMHG | SYSTOLIC BLOOD PRESSURE: 108 MMHG | OXYGEN SATURATION: 96 % | RESPIRATION RATE: 18 BRPM | TEMPERATURE: 98 F | HEART RATE: 76 BPM

## 2022-05-13 PROCEDURE — G0151 HHCP-SERV OF PT,EA 15 MIN: HCPCS

## 2022-05-14 NOTE — HOME HEALTH
REASON FOR REFERRAL: 57  year old (female) presents with c/o right  hip pain following NICOLASA    DIAGNOSIS:  aftercare R NICOLASA - anterior approach Dr. Luis     SURGICAL PROCEDURE:   R NICOLASA 5/11      PERTINENT MEDICAL HISTORY:  Hammer toe   Insomnia   Hemorrhoids, external   Menopause syndrome   Primary osteoarthritis of right hip   Palpitations   Ventricular tachycardia, non-sustained    History of rheumatic fever as a child   Anxiety     PRIOR LEVEL OF FUNCTION: I all mobility and self care; works in clothing bout[employer]     SUBJECTIVE: Im doing pretty well    DATE OF NEXT APPOINTMENT WITH DOCTOR: 5/25/22 Dr Marshall  ____________    PLAN FOR NEXT VISIT:   MEDICAL NECESSITY FOR ONGOING SKILLED THERAPY:skilled physical therapy medically necessary for treatment of: pain, gait deficits and weakness following recent NICOLASA.              .Requires instruction in appropriate progression of exercises; education in fall prevention/pain/edema management; gait training to reduce reliance on assistive device; balance retraining to prevent falls.  Without skilled physical therapy, patient at risk for: falls , increased reliance on caregivers; chronic pain and gait deficits    SPECIFIC INTERVENTIONS AND GOALS TO ADDRESS ON NEXT VISIT:  - instruct in HEP - progress to standing exercises  - gait training with RW ; progress to cane when appropriate  - review pain/edema management techniques    FREQUENCY AND DURATION: 1 week1; 2 week 2    ANY OTHER FOLLOW UP NEEDED: none    REASSESSMENT DUE DATE: 30 day 6/12/22

## 2022-05-16 ENCOUNTER — HOME CARE VISIT (OUTPATIENT)
Dept: HOME HEALTH SERVICES | Facility: HOME HEALTHCARE | Age: 57
End: 2022-05-16

## 2022-05-16 VITALS
RESPIRATION RATE: 17 BRPM | DIASTOLIC BLOOD PRESSURE: 70 MMHG | TEMPERATURE: 97.3 F | HEART RATE: 84 BPM | SYSTOLIC BLOOD PRESSURE: 108 MMHG | OXYGEN SATURATION: 99 %

## 2022-05-16 PROCEDURE — G0151 HHCP-SERV OF PT,EA 15 MIN: HCPCS

## 2022-05-17 NOTE — HOME HEALTH
SUBJECTIVE: Im doing great.  THis is the first day I haven't had pain pills.      DATE OF NEXT APPOINTMENT WITH DOCTOR: 5/25/22 Dr Marshall   ____________   PLAN FOR NEXT VISIT:   MEDICAL NECESSITY FOR ONGOING SKILLED THERAPY:skilled physical therapy medically necessary for treatment of: pain, gait deficits and weakness following recent NICOLASA. .Requires instruction in appropriate progression of exercises; education in fall prevention/pain/edema management; gait training to reduce reliance on assistive device; balance retraining to prevent falls. Without skilled physical therapy, patient at risk for: falls , increased reliance on caregivers; chronic pain and gait deficits     SPECIFIC INTERVENTIONS AND GOALS TO ADDRESS ON NEXT VISIT:   - instruct in HEP - progress  standing exercises   - gait training with RW ; continue gait training with cane      FREQUENCY AND DURATION: 1 week1; 2 week 2   ANY OTHER FOLLOW UP NEEDED: none   REASSESSMENT DUE DATE: 30 day 6/12/22

## 2022-05-18 ENCOUNTER — HOME CARE VISIT (OUTPATIENT)
Dept: HOME HEALTH SERVICES | Facility: HOME HEALTHCARE | Age: 57
End: 2022-05-18

## 2022-05-18 PROCEDURE — G0151 HHCP-SERV OF PT,EA 15 MIN: HCPCS

## 2022-05-19 VITALS
DIASTOLIC BLOOD PRESSURE: 60 MMHG | SYSTOLIC BLOOD PRESSURE: 96 MMHG | OXYGEN SATURATION: 96 % | TEMPERATURE: 97.7 F | HEART RATE: 53 BPM

## 2022-05-19 NOTE — HOME HEALTH
"SUBJECTIVE: I didnt take any pain pills yesterday or today yet.  I walked outside yesterday  with the walker and did fine.  Patient declines outpatient PT at this time.  States has been able to get out of the house a few times and plans to \"go out with the girls on Friday\"    DATE OF NEXT APPOINTMENT WITH DOCTOR: 5/25/22 Dr Marshall     ASSESSMENT: Patient with continued improvment in pain control.  I transfers.  slowly progressing from use of RW to SPC.  d/c from agency due to no longer homebound and goals met at this time."

## 2022-05-25 ENCOUNTER — OFFICE VISIT (OUTPATIENT)
Dept: ORTHOPEDIC SURGERY | Facility: CLINIC | Age: 57
End: 2022-05-25

## 2022-05-25 VITALS — WEIGHT: 161 LBS | BODY MASS INDEX: 25.27 KG/M2 | TEMPERATURE: 98 F | HEIGHT: 67 IN

## 2022-05-25 DIAGNOSIS — M16.11 PRIMARY OSTEOARTHRITIS OF RIGHT HIP: ICD-10-CM

## 2022-05-25 DIAGNOSIS — Z96.641 STATUS POST TOTAL REPLACEMENT OF RIGHT HIP: Primary | ICD-10-CM

## 2022-05-25 PROCEDURE — 99024 POSTOP FOLLOW-UP VISIT: CPT | Performed by: ORTHOPAEDIC SURGERY

## 2022-05-25 PROCEDURE — 73501 X-RAY EXAM HIP UNI 1 VIEW: CPT | Performed by: ORTHOPAEDIC SURGERY

## 2022-05-25 RX ORDER — HYDROCODONE BITARTRATE AND ACETAMINOPHEN 7.5; 325 MG/1; MG/1
TABLET ORAL
Qty: 30 TABLET | Refills: 0 | Status: SHIPPED | OUTPATIENT
Start: 2022-05-25 | End: 2022-10-21

## 2022-05-25 NOTE — PROGRESS NOTES
Sakina HURST : 1965 MRN: [id number] DATE: 2022    DIAGNOSIS: 2 week follow up right total hip     SUBJECTIVE:Patient returns today for 2 week follow up of right total hip replacement. Patient reports doing well with no unusual complaints. Appears to be progressing appropriately.    OBJECTIVE:   Exam:. The incision is healing appropriately. No sign of infection. Range of motion is progressing as expected. The calf is soft and nontender with a negative Homans sign.    DIAGNOSTIC STUDIES  Xrays: 2 views of the right hip (AP pelvis and lateral right hip) were ordered and reviewed for evaluation of recent hip replacement. They demonstrate a well positioned, well aligned hip replacement without complicating factors noted. In comparison with previous films there has been interval implant placement.    ASSESSMENT: 2 week status post right hip replacement.    PLAN: 1) Dressing removed and steri strips applied   2) PT exercises   3) Continue ice PRN   4) WBAT   5) aspirin 81 mg orally every day for 4 weeks   6) Follow up in 4 weeks with repeat Xrays of right hip (2views)    2-week total hip information packet given and discussed including dental and GI procedure antibiotic prophylaxis.     Maciel Luis MD  2022

## 2022-06-27 ENCOUNTER — OFFICE VISIT (OUTPATIENT)
Dept: ORTHOPEDIC SURGERY | Facility: CLINIC | Age: 57
End: 2022-06-27

## 2022-06-27 VITALS — TEMPERATURE: 97.8 F | WEIGHT: 159.2 LBS | HEIGHT: 67 IN | BODY MASS INDEX: 24.99 KG/M2

## 2022-06-27 DIAGNOSIS — R52 PAIN: ICD-10-CM

## 2022-06-27 DIAGNOSIS — Z96.641 STATUS POST TOTAL REPLACEMENT OF RIGHT HIP: Primary | ICD-10-CM

## 2022-06-27 PROCEDURE — 73502 X-RAY EXAM HIP UNI 2-3 VIEWS: CPT | Performed by: ORTHOPAEDIC SURGERY

## 2022-06-27 PROCEDURE — 99024 POSTOP FOLLOW-UP VISIT: CPT | Performed by: ORTHOPAEDIC SURGERY

## 2022-06-27 NOTE — PROGRESS NOTES
Sakina HURST : 1965 MRN: [id number] DATE: 2022    DIAGNOSIS: 6 week follow up right total hip     SUBJECTIVE:Patient returns today for 6 week follow up of right total hip replacement. Patient reports doing well with no unusual complaints. Appears to be progressing appropriately and is off a cane.  States she says some anterior pain when gets up but different than previous overall the pain she had prior to surgery is much improved she is mobilizing much better.    OBJECTIVE:   Exam:. The incision is healed. No sign of infection. Range of motion is progressing as expected. The calf is soft and nontender with a negative Homans sign. Strength progressing    DIAGNOSTIC STUDIES  Xrays: 2 views of the right  hip (AP pelvis and lateral right hip) were ordered and reviewed for evaluation of recent hip replacement. They demonstrate a well positioned, well aligned hip replacement without complicating factors noted. In comparison with previous films there has been interval implant placement.    ASSESSMENT: 6 week status post right  hip replacement.    PLAN: 1) Activity as tolerated   2) Continue hip strengthening exercises    3) ok to stop DVT ppx    4) Follow up in 8 weeks with repeat Xrays of right hip (2views AP Pelvis and lateral  right hip)    Antibiotics before dental and GI procedures discussed.     Maciel Luis MD  2022

## 2022-08-15 ENCOUNTER — DOCUMENTATION (OUTPATIENT)
Dept: SURGERY | Facility: CLINIC | Age: 57
End: 2022-08-15

## 2022-08-15 RX ORDER — CEPHALEXIN 500 MG/1
500 CAPSULE ORAL EVERY 12 HOURS
Qty: 10 CAPSULE | Refills: 0 | Status: CANCELLED | OUTPATIENT
Start: 2022-08-15

## 2022-08-15 RX ORDER — CEPHALEXIN 500 MG/1
CAPSULE ORAL
Qty: 4 CAPSULE | Refills: 2 | Status: SHIPPED | OUTPATIENT
Start: 2022-08-15 | End: 2022-10-21

## 2022-08-29 ENCOUNTER — OFFICE VISIT (OUTPATIENT)
Dept: ORTHOPEDIC SURGERY | Facility: CLINIC | Age: 57
End: 2022-08-29

## 2022-08-29 VITALS — HEIGHT: 67 IN | WEIGHT: 163 LBS | BODY MASS INDEX: 25.58 KG/M2 | TEMPERATURE: 97.9 F | RESPIRATION RATE: 16 BRPM

## 2022-08-29 DIAGNOSIS — Z96.641 STATUS POST TOTAL REPLACEMENT OF RIGHT HIP: Primary | ICD-10-CM

## 2022-08-29 DIAGNOSIS — R52 PAIN: ICD-10-CM

## 2022-08-29 PROCEDURE — 99213 OFFICE O/P EST LOW 20 MIN: CPT | Performed by: ORTHOPAEDIC SURGERY

## 2022-08-29 PROCEDURE — 73502 X-RAY EXAM HIP UNI 2-3 VIEWS: CPT | Performed by: ORTHOPAEDIC SURGERY

## 2022-08-29 NOTE — PROGRESS NOTES
Sakina HURST : 1965 MRN: [id number] DATE: 2022    DIAGNOSIS: Right total hip arthroplasty    SUBJECTIVE:Patient returns today for 3.5 month follow up of right total hip replacement. Patient reports doing well with no unusual complaints. Appears to be progressing appropriately and is off a cane.  She occasionally has some anterior pain with bending forward but overall that has decreased and she feels she continues to improve.    OBJECTIVE:   Exam:. The incision is healed. No sign of infection. Range of motion is progressing as expected. The calf is soft and nontender with a negative Homans sign. Strength progressing    DIAGNOSTIC STUDIES  Xrays: 2 views of the right hip (AP pelvis and lateral right hip) were ordered and reviewed for evaluation of recent hip replacement. They demonstrate a well positioned, well aligned hip replacement without complicating factors noted. In comparison with previous films there has been interval implant placement.  No significant changes compared to previous films.    ASSESSMENT: 3.5 months status post right hip replacement.    PLAN: 1) Activity as tolerated   2) Continue hip strengthening exercises    3) Follow up 1 year post-op with repeat Xrays of right hip (2views AP Pelvis and lateral right hip)    Discussed antibiotic prophylaxis prior to any dental procedures or GI procedures.    Maciel Luis MD  2022

## 2022-09-13 ENCOUNTER — TELEPHONE (OUTPATIENT)
Dept: SURGERY | Facility: CLINIC | Age: 57
End: 2022-09-13

## 2022-09-22 ENCOUNTER — OFFICE VISIT (OUTPATIENT)
Dept: ORTHOPEDIC SURGERY | Facility: CLINIC | Age: 57
End: 2022-09-22

## 2022-09-22 VITALS — WEIGHT: 163 LBS | TEMPERATURE: 97.8 F | BODY MASS INDEX: 25.58 KG/M2 | HEIGHT: 67 IN

## 2022-09-22 DIAGNOSIS — R52 PAIN: ICD-10-CM

## 2022-09-22 DIAGNOSIS — Q66.6 PES PLANOVALGUS: Primary | ICD-10-CM

## 2022-09-22 PROCEDURE — 73610 X-RAY EXAM OF ANKLE: CPT | Performed by: ORTHOPAEDIC SURGERY

## 2022-09-22 PROCEDURE — 99214 OFFICE O/P EST MOD 30 MIN: CPT | Performed by: ORTHOPAEDIC SURGERY

## 2022-09-22 NOTE — PROGRESS NOTES
General Exam    Patient: Sakina HURST    YOB: 1965    Medical Record Number: [id number]    Chief Complaints: Left ankle pain and swelling    History of Present Illness:     57 y.o. female patient who presents for evaluation and treatment of left ankle pain and swelling.  Patient states she has had ankle swelling for the past year no trauma.  Swelling is noticed after a long day when she is up on her feet or with increased activity.  Seems to have gotten a little worse regards to pain over the past few months she notices throbbing type pain especially at night.  Pain swelling is located in the anterior lateral aspect of her ankle.    Denies any numbness or tingling.  Denies any fevers, cough or shortness of breath.    Allergies: No Known Allergies    Home Medications:      Current Outpatient Medications:   •  aspirin 81 MG EC tablet, Take 1 tablet by mouth every 12 hours for 2 weeks, then 1 tablet daily for 4 weeks (Patient taking differently: Take 1 tablet by mouth every 12 hours for 2 weeks, then 1 tablet daily for 4 weeks), Disp: 60 tablet, Rfl: 0  •  atenolol (TENORMIN) 25 MG tablet, Take 1 tablet by mouth Daily., Disp: 90 tablet, Rfl: 3  •  cephalexin (KEFLEX) 500 MG capsule, Take 4 tablets 1 hour prior to dental cleaning or procedure, Disp: 4 capsule, Rfl: 2  •  cycloSPORINE (RESTASIS) 0.05 % ophthalmic emulsion, Administer 1 drop to both eyes Every 12 (Twelve) Hours., Disp: , Rfl:   •  docusate sodium 100 MG capsule, Take 1 capsule by mouth 2 (Two) Times a Day., Disp: 60 capsule, Rfl: 0  •  HYDROcodone-acetaminophen (NORCO) 7.5-325 MG per tablet, Take 1  tablet by mouth every 6 hours as needed for pain, Disp: 30 tablet, Rfl: 0  •  ondansetron (ZOFRAN) 4 MG tablet, Take 1 tablet by mouth Every 6 (Six) Hours As Needed for Nausea or Vomiting., Disp: 10 tablet, Rfl: 0  •  PARoxetine (PAXIL) 30 MG tablet, Take 1 tablet by mouth Every Morning., Disp: 90 tablet, Rfl: 1  •  vitamin D3 125 MCG (5000 UT)  capsule capsule, Take 2,000 Units by mouth Daily., Disp: , Rfl:   •  zolpidem (AMBIEN) 5 MG tablet, Take 1 tablet by mouth At Night As Needed for Sleep., Disp: 30 tablet, Rfl: 2  No current facility-administered medications for this visit.    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 GHASSAN COLE   • Left ovarian cyst 2006    FOLLICLE CYST   • Lesion of left plantar nerve    • Menopause    • Neuroma of foot 5 years    Surgery to correct   • Onychomycosis 2014   • Primary insomnia    • Rheumatic fever     AS A CHILD   • Right hip pain    • Syncope 2018    SEEN AT [facility]   • Thrombosed hemorrhoids 2017    SAW DR. FAUZIA GIRON   • Ventricular tachycardia (HCC)        Past Surgical History:   Procedure Laterality Date   • BREAST SURGERY Bilateral 2005    AUGMENTATION MAMMOPLASTY, DR. TONY FERGUSON AT [facility]   •  SECTION N/A    • CHOLECYSTECTOMY N/A     DONE IN [place]   • COLONOSCOPY N/A 2017    ENTIRE COLON WNL, RESCOPE IN 5 YRS, DR. FAUZIA GIRON AT [facility]   • COLPOSCOPY W/ BIOPSY / CURETTAGE N/A 2007    BX OF PERIANAL LESION, PATH: SEVERE DYSPLASIA, CARCINOMA IN SITU, DR.REBECCA MICHELLE AT [facility]   • ENDOMETRIAL ABLATION N/A 2006    WITH EUA, LYSIS OF ADHESIONS, AND ASPIRATION OF LEFT OVARIAN FOLLICLE CYST, DR. TONY MICHELLE AT [facility]   • FOOT SURGERY Left 2020    plate and pen removal    • NEUROMA SURGERY Bilateral 02/10/2016     "right third intermetatarsal space; [facility]; Lee ZUHAIR James   • TOTAL HIP ARTHROPLASTY Right 5/11/2022    Procedure: Right anterior total hip arthroplasty;  Surgeon: Maciel Luis MD;  Location: [facility] OR;  Service: Orthopedics;  Laterality: Right;       Social History     Occupational History   • Not on file   Tobacco Use   • Smoking status: Never Smoker   • Smokeless tobacco: Never Used   • Tobacco comment: Caffeine - Yes   Vaping Use   • Vaping Use: Never used   Substance and Sexual Activity   • Alcohol use: No   • Drug use: No   • Sexual activity: Defer      Social History     Social History Narrative   • Not on file       Family History   Problem Relation Age of Onset   • Heart disease Mother    • Cancer Mother    • Hypertension Mother    • Depression Mother    • Colon polyps Mother    • Colon cancer Mother    • Mental illness Mother    • Heart disease Father    • Depression Father    • Mental illness Father    • Cancer Father    • No Known Problems Daughter    • No Known Problems Son    • Malig Hyperthermia Neg Hx        Review of Systems:      Constitutional: Denies fever, shaking or chills     All other pertinent positives and negatives as noted above in HPI.    Physical Exam: 57 y.o. female    Vitals:    09/22/22 1108   Temp: 97.8 °F (36.6 °C)   TempSrc: Temporal   Weight: 73.9 kg (163 lb)   Height: 170.2 cm (67\")       General:  Patient is awake and alert.  Appears in no acute distress or discomfort.        Musculoskeletal/Extremities:    Left lower extremity evaluated there is positive swelling of the left ankle compared to the right.  Ankle range of motion is full and painless.  Ankle is stable to varus valgus stress.  Negative anterior drawer posterior drawer.  Negative heel squeeze.  Appropriate strength with dorsiflexion, plantarflexion, eversion and inversion.  There is some tenderness palpation along the anterior lateral aspect of the ankle joint.  With standing the patient does " appear to be flat-footed and ankles are in valgus.         Radiology:       3 views left ankle AP oblique and lateral taken reviewed to evaluate the patient's complaint/s.    Patient does not show any overt bony or osseous abnormality.  Ankle joint space appears to be appropriate.     No imaging for comparison.    Assessment: Left ankle pes planovalgus/flexible flatfoot      Plan:      Discussed the findings with the patient and given her increased symptoms I think would be best if we had my foot and ankle partner evaluate her for additional recommendations.  During the interim I will go ahead and send her to a physical therapy.  She may take anti-inflammatories and use ice for any pain or swelling.           We will plan for follow up as needed.    All questions were answered.  Patient understands and agrees with the plan.    Maciel Luis MD    09/22/2022    CC to Ainsley Patton MD

## 2022-10-11 ENCOUNTER — OFFICE VISIT (OUTPATIENT)
Dept: OBSTETRICS AND GYNECOLOGY | Facility: CLINIC | Age: 57
End: 2022-10-11

## 2022-10-11 ENCOUNTER — PROCEDURE VISIT (OUTPATIENT)
Dept: OBSTETRICS AND GYNECOLOGY | Facility: CLINIC | Age: 57
End: 2022-10-11

## 2022-10-11 ENCOUNTER — APPOINTMENT (OUTPATIENT)
Dept: WOMENS IMAGING | Facility: HOSPITAL | Age: 57
End: 2022-10-11

## 2022-10-11 VITALS
HEIGHT: 67 IN | BODY MASS INDEX: 25.43 KG/M2 | SYSTOLIC BLOOD PRESSURE: 96 MMHG | DIASTOLIC BLOOD PRESSURE: 65 MMHG | WEIGHT: 162 LBS

## 2022-10-11 DIAGNOSIS — Z12.31 VISIT FOR SCREENING MAMMOGRAM: Primary | ICD-10-CM

## 2022-10-11 DIAGNOSIS — Z78.0 POSTMENOPAUSAL STATUS: ICD-10-CM

## 2022-10-11 DIAGNOSIS — Z01.419 WELL WOMAN EXAM WITH ROUTINE GYNECOLOGICAL EXAM: Primary | ICD-10-CM

## 2022-10-11 DIAGNOSIS — Z12.31 BREAST CANCER SCREENING BY MAMMOGRAM: ICD-10-CM

## 2022-10-11 DIAGNOSIS — Z12.4 CERVICAL CANCER SCREENING: ICD-10-CM

## 2022-10-11 PROCEDURE — 77067 SCR MAMMO BI INCL CAD: CPT | Performed by: STUDENT IN AN ORGANIZED HEALTH CARE EDUCATION/TRAINING PROGRAM

## 2022-10-11 PROCEDURE — 77067 SCR MAMMO BI INCL CAD: CPT | Performed by: RADIOLOGY

## 2022-10-11 PROCEDURE — 99386 PREV VISIT NEW AGE 40-64: CPT | Performed by: STUDENT IN AN ORGANIZED HEALTH CARE EDUCATION/TRAINING PROGRAM

## 2022-10-11 PROCEDURE — 77063 BREAST TOMOSYNTHESIS BI: CPT | Performed by: RADIOLOGY

## 2022-10-11 PROCEDURE — 3008F BODY MASS INDEX DOCD: CPT | Performed by: STUDENT IN AN ORGANIZED HEALTH CARE EDUCATION/TRAINING PROGRAM

## 2022-10-11 PROCEDURE — 2014F MENTAL STATUS ASSESS: CPT | Performed by: STUDENT IN AN ORGANIZED HEALTH CARE EDUCATION/TRAINING PROGRAM

## 2022-10-11 PROCEDURE — 77063 BREAST TOMOSYNTHESIS BI: CPT | Performed by: STUDENT IN AN ORGANIZED HEALTH CARE EDUCATION/TRAINING PROGRAM

## 2022-10-11 NOTE — PROGRESS NOTES
GYN Annual Exam     CC- Here for annual exam.     Sakina Guerrier is a 57 y.o. female who presents for annual well woman exam. She is postmenopausal. She denies vaginal bleeding or vasomotor symptoms. She has no complaints today.     OB History        2    Para   2    Term                AB        Living           SAB        IAB        Ectopic        Molar        Multiple        Live Births                G1- C/S   G2-      Menopause- 53 years old   Current contraception: post menopausal status  History of abnormal Pap smear: yes - 3 years ago- HPV positive and underwent colposcopy  Family history of uterine, colon or ovarian cancer: yes - maternal grandfather diagnosed with colon cancer in his 60s  History of abnormal mammogram: no  Family history of breast cancer: yes - maternal aunt diagnosed at 50  Last Pap : 10/2021- normal   Last mammogram: 10/2021- normal   Last colonoscopy: scheduled for one later this year.   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 GHASSAN COLE   • Left ovarian cyst 2006    FOLLICLE CYST   • Lesion of left plantar nerve    • Menopause    • Neuroma of foot 5 years    Surgery to correct   • Onychomycosis 2014   • Primary insomnia    • Rheumatic fever     AS A CHILD   • Right hip pain    • Syncope 2018    SEEN AT [facility]   • Thrombosed hemorrhoids 2017    SAW DR. FAUZIA GIRON   • Ventricular tachycardia        Past Surgical History:   Procedure Laterality Date   • BREAST SURGERY Bilateral 2005    AUGMENTATION  MAMMOPLASTY, DR. TONY FERGUSON AT [facility]   •  SECTION N/A    • CHOLECYSTECTOMY N/A 2016    DONE IN [place]   • COLONOSCOPY N/A 2017    ENTIRE COLON WNL, RESCOPE IN 5 YRS, DR. FAUZIA GIRON AT [facility]   • COLPOSCOPY W/ BIOPSY / CURETTAGE N/A 2007    BX OF PERIANAL LESION, PATH: SEVERE DYSPLASIA, CARCINOMA IN SITU, DR.REBECCA MICHELLE AT [facility]   • ENDOMETRIAL ABLATION N/A 2006    WITH EUA, LYSIS OF ADHESIONS, AND ASPIRATION OF LEFT OVARIAN FOLLICLE CYST, DR. TONY MICHELLE AT [facility]   • FOOT SURGERY Left     plate and pen removal    • NEUROMA SURGERY Bilateral 02/10/2016    right third intermetatarsal space; [facility]; Lee James DPM   • TOTAL HIP ARTHROPLASTY Right 2022    Procedure: Right anterior total hip arthroplasty;  Surgeon: Maciel Luis MD;  Location: [facility];  Service: Orthopedics;  Laterality: Right;         Current Outpatient Medications:   •  atenolol (TENORMIN) 25 MG tablet, Take 1 tablet by mouth Daily., Disp: 90 tablet, Rfl: 3  •  cycloSPORINE (RESTASIS) 0.05 % ophthalmic emulsion, Administer 1 drop to both eyes Every 12 (Twelve) Hours., Disp: , Rfl:   •  PARoxetine (PAXIL) 30 MG tablet, Take 1 tablet by mouth Every Morning., Disp: 90 tablet, Rfl: 1  •  vitamin D3 125 MCG (5000 UT) capsule capsule, Take 2,000 Units by mouth Daily., Disp: , Rfl:   •  zolpidem (AMBIEN) 5 MG tablet, Take 1 tablet by mouth At Night As Needed for Sleep., Disp: 30 tablet, Rfl: 2  •  aspirin 81 MG EC tablet, Take 1 tablet by mouth every 12 hours for 2 weeks, then 1 tablet daily for 4 weeks (Patient taking differently: Take 1 tablet by mouth every 12 hours for 2 weeks, then 1 tablet daily for 4 weeks), Disp: 60 tablet, Rfl: 0  •  cephalexin (KEFLEX) 500 MG capsule, Take 4 tablets 1 hour prior to dental cleaning or procedure, Disp: 4 capsule, Rfl: 2  •  docusate sodium 100 MG capsule, Take 1 capsule by mouth 2 (Two) Times a Day., Disp: 60 capsule, Rfl: 0  •   "HYDROcodone-acetaminophen (NORCO) 7.5-325 MG per tablet, Take 1  tablet by mouth every 6 hours as needed for pain, Disp: 30 tablet, Rfl: 0  •  ondansetron (ZOFRAN) 4 MG tablet, Take 1 tablet by mouth Every 6 (Six) Hours As Needed for Nausea or Vomiting., Disp: 10 tablet, Rfl: 0  No current facility-administered medications for this visit.    Facility-Administered Medications Ordered in Other Visits:   •  Chlorhexidine Gluconate Cloth 2 % pads, , Apply externally, Take As Directed, Peter Muir MD    No Known Allergies    Social History     Tobacco Use   • Smoking status: Never   • Smokeless tobacco: Never   • Tobacco comments:     Caffeine - Yes   Vaping Use   • Vaping Use: Never used   Substance Use Topics   • Alcohol use: No   • Drug use: No       Family History   Problem Relation Age of Onset   • Heart disease Father    • Depression Father    • Mental illness Father    • Cancer Father    • Heart disease Mother    • Cancer Mother    • Hypertension Mother    • Depression Mother    • Colon polyps Mother    • Mental illness Mother    • No Known Problems Son    • No Known Problems Daughter    • Colon cancer Maternal Grandfather    • Breast cancer Maternal Aunt    • Malig Hyperthermia Neg Hx        Review of Systems   All other systems reviewed and are negative.      BP 96/65   Ht 170.2 cm (67.01\")   Wt 73.5 kg (162 lb)   LMP 11/26/2016   BMI 25.37 kg/m²     Physical Exam  Vitals reviewed. Exam conducted with a chaperone present.   Constitutional:       General: She is not in acute distress.  HENT:      Head: Normocephalic and atraumatic.      Right Ear: External ear normal.      Left Ear: External ear normal.   Eyes:      Extraocular Movements: Extraocular movements intact.      Pupils: Pupils are equal, round, and reactive to light.   Cardiovascular:      Rate and Rhythm: Normal rate and regular rhythm.   Pulmonary:      Effort: Pulmonary effort is normal. No respiratory distress.   Chest:   Breasts:     " Breasts are symmetrical.      Right: No swelling, bleeding, inverted nipple, mass, nipple discharge, skin change or tenderness.      Left: No swelling, bleeding, inverted nipple, mass, nipple discharge, skin change or tenderness.   Abdominal:      General: There is no distension.      Palpations: Abdomen is soft.      Tenderness: There is no abdominal tenderness. There is no guarding or rebound.   Genitourinary:     General: Normal vulva.      Exam position: Lithotomy position.      Labia:         Right: No rash, tenderness, lesion or injury.         Left: No rash, tenderness, lesion or injury.       Urethra: No prolapse or urethral swelling.      Vagina: No vaginal discharge, erythema, tenderness, bleeding or lesions.      Cervix: Normal.      Uterus: Not enlarged, not fixed and not tender.       Adnexa:         Right: No mass, tenderness or fullness.          Left: No mass, tenderness or fullness.     Musculoskeletal:         General: No deformity. Normal range of motion.      Cervical back: Normal range of motion and neck supple.   Lymphadenopathy:      Upper Body:      Right upper body: No supraclavicular or axillary adenopathy.      Left upper body: No supraclavicular or axillary adenopathy.      Lower Body: No right inguinal adenopathy. No left inguinal adenopathy.   Skin:     General: Skin is warm and dry.   Neurological:      General: No focal deficit present.      Mental Status: She is alert and oriented to person, place, and time.   Psychiatric:         Mood and Affect: Mood normal.         Behavior: Behavior normal.       Assessment     1) GYN annual well woman exam.   2) Postmenopausal status   3) Cervical cancer screening   4) Breast cancer screening      Plan     1) Breast Health - Clinical breast exam & mammogram yearly, Self breast awareness monthly. Mammogram performed today for breast cancer screening.   2) Pap - Collected pap smear with HPV cotesting for cervical cancer screening.   3) Smoking  status- non-smoker   4) Colon health - screening colonoscopy recommended if not up to date  5) Bone health - Weight bearing exercise, dietary calcium recommendations and vitamin D reviewed.   6) Activity recommends - Adult 150-300 min/week of multi-component physical activities that include balance training, aerobic and physical strengthening.    7) Follow up prn and one year      Landy Horne MD

## 2022-10-13 ENCOUNTER — PATIENT ROUNDING (BHMG ONLY) (OUTPATIENT)
Dept: OBSTETRICS AND GYNECOLOGY | Facility: CLINIC | Age: 57
End: 2022-10-13

## 2022-10-13 ENCOUNTER — PATIENT MESSAGE (OUTPATIENT)
Dept: OBSTETRICS AND GYNECOLOGY | Facility: CLINIC | Age: 57
End: 2022-10-13

## 2022-10-13 NOTE — PROGRESS NOTES
My chart message has been sent to the patient for PATIENT ROUNDING with [facility].

## 2022-10-16 LAB
CYTOLOGIST CVX/VAG CYTO: NORMAL
CYTOLOGY CVX/VAG DOC CYTO: NORMAL
CYTOLOGY CVX/VAG DOC THIN PREP: NORMAL
DX ICD CODE: NORMAL
HIV 1 & 2 AB SER-IMP: NORMAL
HPV I/H RISK 4 DNA CVX QL PROBE+SIG AMP: NEGATIVE
OTHER STN SPEC: NORMAL
STAT OF ADQ CVX/VAG CYTO-IMP: NORMAL

## 2022-10-21 ENCOUNTER — OFFICE VISIT (OUTPATIENT)
Dept: INTERNAL MEDICINE | Facility: CLINIC | Age: 57
End: 2022-10-21

## 2022-10-21 VITALS
HEIGHT: 67 IN | BODY MASS INDEX: 25.62 KG/M2 | DIASTOLIC BLOOD PRESSURE: 70 MMHG | WEIGHT: 163.2 LBS | TEMPERATURE: 98 F | HEART RATE: 100 BPM | SYSTOLIC BLOOD PRESSURE: 92 MMHG | OXYGEN SATURATION: 97 %

## 2022-10-21 DIAGNOSIS — Z00.00 ANNUAL PHYSICAL EXAM: Primary | ICD-10-CM

## 2022-10-21 DIAGNOSIS — E78.49 OTHER HYPERLIPIDEMIA: Chronic | ICD-10-CM

## 2022-10-21 DIAGNOSIS — R00.2 PALPITATIONS: Chronic | ICD-10-CM

## 2022-10-21 DIAGNOSIS — F51.04 PSYCHOPHYSIOLOGICAL INSOMNIA: Chronic | ICD-10-CM

## 2022-10-21 DIAGNOSIS — Z86.79 HISTORY OF RHEUMATIC FEVER AS A CHILD: ICD-10-CM

## 2022-10-21 DIAGNOSIS — F41.9 ANXIETY: Chronic | ICD-10-CM

## 2022-10-21 PROBLEM — F40.243 FEAR OF FLYING: Status: RESOLVED | Noted: 2018-09-14 | Resolved: 2022-10-21

## 2022-10-21 PROCEDURE — 99396 PREV VISIT EST AGE 40-64: CPT | Performed by: NURSE PRACTITIONER

## 2022-10-21 PROCEDURE — 99214 OFFICE O/P EST MOD 30 MIN: CPT | Performed by: NURSE PRACTITIONER

## 2022-10-21 RX ORDER — PAROXETINE HYDROCHLORIDE 40 MG/1
40 TABLET, FILM COATED ORAL EVERY MORNING
Qty: 90 TABLET | Refills: 1 | Status: SHIPPED | OUTPATIENT
Start: 2022-10-21

## 2022-10-21 RX ORDER — ZOLPIDEM TARTRATE 5 MG/1
5 TABLET ORAL NIGHTLY PRN
Qty: 30 TABLET | Refills: 2 | Status: SHIPPED | OUTPATIENT
Start: 2022-10-21

## 2022-10-21 NOTE — ASSESSMENT & PLAN NOTE
Recommended 150-300 minutes weekly exercise.   Continue with healthy diet choices according to USDA food guidance.   Labs ordered.   Mammo/Pap smear UTD.   Continue with monthly self breast examinations.   Anticipatory guidance given regarding health prevention/wellness, diet/exercise, tobacco/alcohol/drug education, exercise and wellbeing, covid 19 guidance, and sexual health/STD education.

## 2022-10-21 NOTE — PROGRESS NOTES
"Chief Complaint  Med Refill, Establish Care, and Anxiety    Subjective        Sakina Guerrier presents to [facility]  History of Present Illness  This is a 56 y/o female presenting to office to establish care and for anxiety. Patient is currently  and has 2 adult children. 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     Patient reports no current exercise. Patient reports following healthy diet.     Patient denies any tobacco use. Patient denies alcohol use.     Patient reports following with gynecology-- Dr. Horne; Pap smear completed-- negative. Mammogram completed too- negative. Patient is currently post menopausal.     Patient reports right hip replacement back in May 2022 under Dr. Luis. Patient reports she has recovered well from this.       Patient continues on paxil for anxiety and depression. Patient reports she is having some increased anxiety/depression. Denies suicidal ideation. Patient is interested in increasing dose to 40mg daily. Patient denies seeing a mental health therapist at this time.     Patient reports she uses ambien sparingly for when she travels back and forth to [place]. Patient was following with RANDA Sterling, for family medicine. Patient reports her provider no longer takes insurance. Last fill of ambien was back in June 2022.     Patient reports following with cardiology for hx of palpitations and hx of rheumatic fever as child. Patient has been taking atenolol to treat and prevent heart palpitations. Patient denies any current issues or concerns at this time.       Objective   Vital Signs:  BP 92/70 (BP Location: Left arm, Patient Position: Sitting, Cuff Size: Adult)   Pulse 100   Temp 98 °F (36.7 °C) (Infrared)   Ht 170.2 cm (67.01\")   Wt 74 kg (163 lb 3.2 oz)   SpO2 97%   BMI 25.55 kg/m²   Estimated body mass index is 25.55 kg/m² as calculated from the following:    Height as of this encounter: 170.2 cm (67.01\").    Weight as of this " encounter: 74 kg (163 lb 3.2 oz).          Physical Exam  Constitutional:       General: She is awake.      Appearance: Normal appearance.   HENT:      Head: Normocephalic and atraumatic.      Right Ear: Hearing and external ear normal.      Left Ear: Hearing and external ear normal.      Nose: Nose normal.      Mouth/Throat:      Lips: Pink.      Mouth: Mucous membranes are moist.      Pharynx: Oropharynx is clear.   Eyes:      Extraocular Movements: Extraocular movements intact.      Pupils: Pupils are equal, round, and reactive to light.   Cardiovascular:      Rate and Rhythm: Normal rate and regular rhythm.      Pulses: Normal pulses.      Heart sounds: Normal heart sounds. No murmur heard.    No friction rub. No gallop.   Pulmonary:      Effort: Pulmonary effort is normal.      Breath sounds: Normal breath sounds.   Abdominal:      General: Abdomen is flat. Bowel sounds are normal.      Palpations: Abdomen is soft.   Musculoskeletal:         General: Normal range of motion.      Cervical back: Normal range of motion and neck supple.   Skin:     General: Skin is warm and dry.      Capillary Refill: Capillary refill takes less than 2 seconds.   Neurological:      General: No focal deficit present.      Mental Status: She is alert and oriented to person, place, and time. Mental status is at baseline.      Motor: Motor function is intact.      Coordination: Coordination is intact.      Gait: Gait is intact.      Deep Tendon Reflexes: Reflexes are normal and symmetric.   Psychiatric:         Attention and Perception: Attention normal.         Mood and Affect: Mood normal.         Speech: Speech normal.         Behavior: Behavior normal. Behavior is cooperative.         Thought Content: Thought content normal.         Cognition and Memory: Cognition normal.         Judgment: Judgment normal.        Result Review :                Assessment and Plan   Diagnoses and all orders for this visit:    1. Annual physical exam  (Primary)  Assessment & Plan:  Recommended 150-300 minutes weekly exercise.   Continue with healthy diet choices according to USDA food guidance.   Labs ordered.   Mammo/Pap smear UTD.   Continue with monthly self breast examinations.   Anticipatory guidance given regarding health prevention/wellness, diet/exercise, tobacco/alcohol/drug education, exercise and wellbeing, covid 19 guidance, and sexual health/STD education.       Orders:  -     CBC & Differential; Future  -     Comprehensive metabolic panel; Future  -     TSH Rfx On Abnormal To Free T4; Future  -     Hemoglobin A1c; Future    2. Palpitations  Assessment & Plan:  Continues on atenolol.   Follows with cardiology      3. Anxiety  Assessment & Plan:  Increase paxil to 40mg daily.       Orders:  -     PARoxetine (Paxil) 40 MG tablet; Take 1 tablet by mouth Every Morning.  Dispense: 90 tablet; Refill: 1    4. Psychophysiological insomnia  Assessment & Plan:  Uses sparingly for travel.   Risks versus benefits discussed.   PDMP reviewed.   Contract signed today.   Refill 30 tablets.     Orders:  -     zolpidem (AMBIEN) 5 MG tablet; Take 1 tablet by mouth At Night As Needed for Sleep.  Dispense: 30 tablet; Refill: 2    5. History of rheumatic fever as a child  Assessment & Plan:  Follows with cardiology       6. Other hyperlipidemia  Assessment & Plan:  Lipid abnormalities are unchanged.  Nutritional counseling was provided.  Lipids will be reassessed in 6 months.    Orders:  -     Lipid panel; Future           Follow Up   Return in about 6 months (around 4/21/2023) for Recheck chronic conditions.  Patient was given instructions and counseling regarding her condition or for health maintenance advice. Please see specific information pulled into the AVS if appropriate.

## 2022-10-21 NOTE — ASSESSMENT & PLAN NOTE
Uses sparingly for travel.   Risks versus benefits discussed.   PDMP reviewed.   Contract signed today.   Refill 30 tablets.

## 2022-10-24 ENCOUNTER — HOSPITAL ENCOUNTER (OUTPATIENT)
Dept: PHYSICAL THERAPY | Facility: HOSPITAL | Age: 57
Setting detail: THERAPIES SERIES
Discharge: HOME | End: 2022-10-24

## 2022-10-24 DIAGNOSIS — M79.672 LEFT FOOT PAIN: ICD-10-CM

## 2022-10-24 DIAGNOSIS — G89.29 CHRONIC PAIN OF LEFT ANKLE: Primary | ICD-10-CM

## 2022-10-24 DIAGNOSIS — M25.572 CHRONIC PAIN OF LEFT ANKLE: Primary | ICD-10-CM

## 2022-10-24 PROCEDURE — 97161 PT EVAL LOW COMPLEX 20 MIN: CPT

## 2022-10-24 PROCEDURE — 97110 THERAPEUTIC EXERCISES: CPT

## 2022-10-24 NOTE — THERAPY EVALUATION
Outpatient Physical Therapy Ortho Initial Evaluation  [facility]     Patient Name: Sakina Guerrier  : 1965  MRN: [id number]  Today's Date: 10/24/2022      Visit Date: 10/24/2022    Patient Active Problem List   Diagnosis   • Insomnia   • Hemorrhoids, external   • Menopause syndrome   • Primary osteoarthritis of right hip   • Palpitations   • Ventricular tachycardia, non-sustained   • History of rheumatic fever as a child   • Anxiety   • Annual physical exam   • Other hyperlipidemia        Past Medical History:   Diagnosis Date   • Acne    • Acquired hypothyroidism    • Anxiety    • 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 GHASSAN COLE   • Left ovarian cyst 2006    FOLLICLE CYST   • Lesion of left plantar nerve    • Menopause    • Neuroma of foot 5 years    Surgery to correct   • Onychomycosis 2014   • Primary insomnia    • Rheumatic fever     AS A CHILD   • Right hip pain    • Syncope 2018    SEEN AT [facility]   • Thrombosed hemorrhoids 2017    SAW DR. FAUZIA GIRON   • Ventricular tachycardia         Past Surgical History:   Procedure Laterality Date   • BREAST SURGERY Bilateral 2005    AUGMENTATION MAMMOPLASTY, DR. TONY FERGUSON AT [facility]   •  SECTION N/A    • CHOLECYSTECTOMY N/A     DONE IN [place]   • COLONOSCOPY N/A 2017    ENTIRE COLON WNL, RESCOPE IN 5 YRS, DR. FAUZIA GIRON AT [facility]   • COLPOSCOPY W/ BIOPSY / CURETTAGE N/A 2007    BX OF PERIANAL LESION, PATH: SEVERE DYSPLASIA, CARCINOMA IN SITU, DR.REBECCA MICHELLE AT [facility]   • ENDOMETRIAL ABLATION N/A 2006    WITH EUA, LYSIS OF ADHESIONS, AND ASPIRATION OF LEFT OVARIAN FOLLICLE CYST, DR. TONY MICHELLE AT [facility]   • FOOT  SURGERY Left 2020    plate and pen removal    • NEUROMA SURGERY Bilateral 02/10/2016    right third intermetatarsal space; [facility]; Lee James DPM   • TOTAL HIP ARTHROPLASTY Right 5/11/2022    Procedure: Right anterior total hip arthroplasty;  Surgeon: Maciel Luis MD;  Location: [facility] OR;  Service: Orthopedics;  Laterality: Right;       Visit Dx:     ICD-10-CM ICD-9-CM   1. Chronic pain of left ankle  M25.572 719.47    G89.29 338.29   2. Left foot pain  M79.672 729.5          Patient History     Row Name 10/24/22 1100             History    Chief Complaint Pain  -LB      Type of Pain Ankle pain  -LB      Date Current Problem(s) Began 10/24/21  -LB      Brief Description of Current Complaint Pt reports 1 year hx of increasing L ankle/foot pain that is primarily superior aspect of L foot. She had NICOLASA on R 5/2022 that went well. 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 She mostly wears tennis shoes. She feels pain and experiences swelling that increases with standing and walking. She initially denies previous injury but after assessing L foot, noted 2 scars. Pt states she had neuroma removed 2 years ago and then broke 5th metatarsal and required pinning that were later removed.  -LB      Previous treatment for THIS PROBLEM Medication  -LB      Hand Dominance right-handed  -LB      Occupation/sports/leisure activities works in retail  -LB      Patient seeing anyone else for problem(s)? yes  -LB      How has patient tried to help current problem? Aleve  -LB      History of Previous Related Injuries hx of neuroma removal and 5th metatarsal pinning and subsequent hardware removal  -LB         Pain     Pain Location Ankle  -LB      Pain at Present 4  -LB      Pain at Best 4  -LB      Pain at Worst 8  -LB      Pain Frequency Intermittent  -LB      Pain Description Sore;Aching  -LB      What Performance Factors Make the Current Problem(s) WORSE? standing/walking  -LB       What Performance Factors Make the Current Problem(s) BETTER? icing/elevating  -LB      Tolerance Time- Standing inc pain  -LB      Tolerance Time- Sitting occasional swelling  -LB      Tolerance Time- Walking inc pain  -LB      Tolerance Time- Lying no change  -LB      Is your sleep disturbed? No  -LB      What position do you sleep in? Supine  -LB      Difficulties at work? Inc pain with workday.  -LB      Difficulties with ADL's? Able to perform.  -LB         Fall Risk Assessment    Any falls in the past year: No  -LB         Services    Prior Rehab/Home Health Experiences No  -LB      Are you currently receiving Home Health services No  -LB      Do you plan to receive Home Health services in the near future No  -LB         Daily Activities    Primary Language English  -LB      Pt Participated in POC and Goals Yes  -LB         Safety    Are you being hurt, hit, or frightened by anyone at home or in your life? No  -LB      Are you being neglected by a caregiver No  -LB      Have you had any of the following issues with N/A  -LB            User Key  (r) = Recorded By, (t) = Taken By, (c) = Cosigned By    Initials Name Provider Type    Lay Arana PT Physical Therapist                 PT Ortho     Row Name 10/24/22 1200       Subjective Comments    Subjective Comments I have an appt with Dr. Pride next week.  -LB       Subjective Pain    Able to rate subjective pain? yes  -LB    Pre-Treatment Pain Level 4  -LB       Posture/Observations    Posture/Observations Comments inc supination L foot  -LB       Left Lower Ext    Lt Ankle Dorsiflexion AROM 10 deg  -LB    Lt Ankle Plantarflexion AROM 60 deg  -LB    Lt Ankle Inversion AROM 30 deg  -LB    Lt Ankle Eversion AROM 15 deg  -LB       MMT Left Lower Ext    Lt Ankle Plantarflexion MMT, Gross Movement (4-/5) good minus  -LB    Lt Ankle Dorsiflexion MMT, Gross Movement (4/5) good  -LB    Lt Ankle Subtalar Inversion MMT, Gross Movement (4+/5) good plus  -LB    Lt  Ankle Subtalar Eversion MMT, Gross Movement (4+/5) good plus  -LB       Sensation    Sensation WNL? WNL  -LB       Gait/Stairs (Locomotion)    Daggett Level (Gait) independent  -LB          User Key  (r) = Recorded By, (t) = Taken By, (c) = Cosigned By    Initials Name Provider Type    Lay Arana PT Physical Therapist                            Therapy Education  Education Details: discussed need for arch support, supportive foot wear, issued HEP: [id number]  Program: New  How Provided: Verbal, Demonstration, Written  Provided to: Patient  Level of Understanding: Teach back education performed, Verbalized, Demonstrated      PT OP Goals     Row Name 10/24/22 1200          PT Short Term Goals    STG Date to Achieve 11/07/22  -LB     STG 1 Pt will demonstrate understanding and compliance with initial HEP.  -LB     STG 1 Progress New  -LB     STG 2 Pt will acquire appropriately fitted arch support and improved footwear for work day.  -LB     STG 2 Progress New  -LB     STG 3 Pt will demonstrate L foot SLS 15 seconds or greater.  -LB     STG 3 Progress New  -LB        Long Term Goals    LTG Date to Achieve 11/23/22  -LB     LTG 1 Pt will demonstrate L foot SLS time 30 seconds or better.  -LB     LTG 1 Progress New  -LB     LTG 2 Pt will report 50% improvement in tolerance to workday.  -LB     LTG 2 Progress New  -LB     LTG 3 Pt will be independent with advanced HEP and management of condition.  -LB     LTG 3 Progress New  -LB        Time Calculation    PT Goal Re-Cert Due Date 01/22/23  -LB           User Key  (r) = Recorded By, (t) = Taken By, (c) = Cosigned By    Initials Name Provider Type    Lay Arana PT Physical Therapist                 PT Assessment/Plan     Row Name 10/24/22 1237          PT Assessment    Functional Limitations Impaired gait;Impaired locomotion;Limitations in community activities;Limitations in functional capacity and performance;Performance in leisure activities;Performance in  work activities  -LB     Impairments Balance;Edema;Endurance;Gait;Impaired muscle endurance;Joint mobility;Muscle strength;Pain;Range of motion;Sensation  -LB     Assessment Comments Pt is 57 y.o. female referred to outpatient physical therapy for evaluation and treatment of  evolving  left ankle pain that has worsened over last year and increases with standing/walking.  Patient presents with L foot pronation, dec arch height, arch collapse with WBing. PMHx consistent with L foot neuroma removal and 5th metatarsal pinning and hardware removal. Personal factors affecting her care include standing on her feet for inc time for work duties. Pt demonstrates signs and symptoms  consistent with referring diagnosis.  Pt scored 18% disability on the FAAM. Pt is limited in their ability to participate in work tasks, long distance walking, standing. She will benefit from continued skilled PT services to address functional deficits. Thank you for this referral.  -LB     Rehab Potential Good  -LB     Patient/caregiver participated in establishment of treatment plan and goals Yes  -LB     Patient would benefit from skilled therapy intervention Yes  -LB        PT Plan    PT Frequency 1x/week  -LB     Predicted Duration of Therapy Intervention (PT) 4-6 visits  -LB     Planned CPT's? PT EVAL LOW COMPLEXITY: 97462;PT RE-EVAL: 43801;PT THER PROC EA 15 MIN: 84452;PT THER ACT EA 15 MIN: 56009;PT MANUAL THERAPY EA 15 MIN: 31922;PT NEUROMUSC RE-EDUCATION EA 15 MIN: 51413;PT GAIT TRAINING EA 15 MIN: 56485  -LB     PT Plan Comments focus on intrinsic strengthening, peroneal strengthening, balance, arch support? MD follow up?, footwear?, consider on/off blue foam, tandem gait, 4 way tband  -LB           User Key  (r) = Recorded By, (t) = Taken By, (c) = Cosigned By    Initials Name Provider Type    Lay Arana, PT Physical Therapist                   OP Exercises     Row Name 10/24/22 1200             Subjective Comments    Subjective  Comments I have an appt with Dr. Pride next week.  -LB         Subjective Pain    Able to rate subjective pain? yes  -LB      Pre-Treatment Pain Level 4  -LB         Total Minutes    07084 - PT Therapeutic Exercise Minutes 15  -LB         Exercise 1    Exercise Name 1 seated TR  -LB      Sets 1 2  -LB      Reps 1 10  -LB         Exercise 2    Exercise Name 2 toe yoga  -LB      Sets 2 2  -LB      Reps 2 10  -LB         Exercise 3    Exercise Name 3 arch doming  -LB      Reps 3 10  -LB      Time 3 5  -LB         Exercise 4    Exercise Name 4 SLS  -LB      Reps 4 3  -LB      Time 4 20  -LB            User Key  (r) = Recorded By, (t) = Taken By, (c) = Cosigned By    Initials Name Provider Type    LB Lay Brown, PT Physical Therapist                              Outcome Measure Options: FAAM  FAAM  FAAM: 18% disability      Time Calculation:     Start Time: 1130  Stop Time: 1215  Time Calculation (min): 45 min  Total Timed Code Minutes- PT: 15 minute(s)  Timed Charges  54794 - PT Therapeutic Exercise Minutes: 15  Total Minutes  Timed Charges Total Minutes: 15   Total Minutes: 15     Therapy Charges for Today     Code Description Service Date Service Provider Modifiers Qty    77967638491 HC PT THER PROC EA 15 MIN 10/24/2022 Lay Brown, PT GP 1    97175107659 HC PT EVAL LOW COMPLEXITY 2 10/24/2022 Lay Brown, PT GP 1          PT G-Codes  Outcome Measure Options: ROSS Brown PT  10/24/2022

## 2022-10-31 ENCOUNTER — OFFICE VISIT (OUTPATIENT)
Dept: ORTHOPEDIC SURGERY | Facility: CLINIC | Age: 57
End: 2022-10-31

## 2022-10-31 VITALS — HEIGHT: 67 IN | TEMPERATURE: 96.8 F | BODY MASS INDEX: 26.12 KG/M2 | WEIGHT: 166.4 LBS

## 2022-10-31 DIAGNOSIS — M21.40 PES PLANUS, UNSPECIFIED LATERALITY: ICD-10-CM

## 2022-10-31 DIAGNOSIS — M76.829 POSTERIOR TIBIAL TENDON DYSFUNCTION: Primary | ICD-10-CM

## 2022-10-31 DIAGNOSIS — M79.672 PAIN ASSOCIATED WITH ACCESSORY NAVICULAR BONE OF FOOT, LEFT: ICD-10-CM

## 2022-10-31 DIAGNOSIS — Q74.2 PAIN ASSOCIATED WITH ACCESSORY NAVICULAR BONE OF FOOT, LEFT: ICD-10-CM

## 2022-10-31 DIAGNOSIS — R52 PAIN: ICD-10-CM

## 2022-10-31 PROCEDURE — 73630 X-RAY EXAM OF FOOT: CPT | Performed by: ORTHOPAEDIC SURGERY

## 2022-10-31 PROCEDURE — 99214 OFFICE O/P EST MOD 30 MIN: CPT | Performed by: ORTHOPAEDIC SURGERY

## 2022-11-29 ENCOUNTER — HOSPITAL ENCOUNTER (OUTPATIENT)
Dept: MRI IMAGING | Facility: HOSPITAL | Age: 57
Discharge: HOME | End: 2022-11-29
Admitting: ORTHOPAEDIC SURGERY

## 2022-11-29 DIAGNOSIS — M76.829 POSTERIOR TIBIAL TENDON DYSFUNCTION: ICD-10-CM

## 2022-11-29 DIAGNOSIS — Q74.2 PAIN ASSOCIATED WITH ACCESSORY NAVICULAR BONE OF FOOT, LEFT: ICD-10-CM

## 2022-11-29 DIAGNOSIS — M79.672 PAIN ASSOCIATED WITH ACCESSORY NAVICULAR BONE OF FOOT, LEFT: ICD-10-CM

## 2022-11-29 DIAGNOSIS — M21.40 PES PLANUS, UNSPECIFIED LATERALITY: ICD-10-CM

## 2022-11-29 PROCEDURE — 73721 MRI JNT OF LWR EXTRE W/O DYE: CPT

## 2022-12-15 ENCOUNTER — OFFICE VISIT (OUTPATIENT)
Dept: ORTHOPEDIC SURGERY | Facility: CLINIC | Age: 57
End: 2022-12-15

## 2022-12-15 VITALS — BODY MASS INDEX: 26.18 KG/M2 | WEIGHT: 166.8 LBS | HEIGHT: 67 IN | TEMPERATURE: 97.9 F

## 2022-12-15 DIAGNOSIS — M25.872 IMPINGEMENT SYNDROME OF LEFT ANKLE: ICD-10-CM

## 2022-12-15 DIAGNOSIS — M76.829 POSTERIOR TIBIAL TENDON DYSFUNCTION: ICD-10-CM

## 2022-12-15 DIAGNOSIS — M25.572 SINUS TARSITIS OF LEFT FOOT: Primary | ICD-10-CM

## 2022-12-15 PROCEDURE — 99213 OFFICE O/P EST LOW 20 MIN: CPT | Performed by: ORTHOPAEDIC SURGERY

## 2022-12-15 PROCEDURE — 20605 DRAIN/INJ JOINT/BURSA W/O US: CPT | Performed by: ORTHOPAEDIC SURGERY

## 2022-12-15 RX ORDER — METHYLPREDNISOLONE ACETATE 40 MG/ML
80 INJECTION, SUSPENSION INTRA-ARTICULAR; INTRALESIONAL; INTRAMUSCULAR; SOFT TISSUE
Status: COMPLETED | OUTPATIENT
Start: 2022-12-15 | End: 2022-12-15

## 2022-12-15 RX ORDER — LIDOCAINE HYDROCHLORIDE 10 MG/ML
3 INJECTION, SOLUTION EPIDURAL; INFILTRATION; INTRACAUDAL; PERINEURAL
Status: COMPLETED | OUTPATIENT
Start: 2022-12-15 | End: 2022-12-15

## 2022-12-15 RX ADMIN — LIDOCAINE HYDROCHLORIDE 3 ML: 10 INJECTION, SOLUTION EPIDURAL; INFILTRATION; INTRACAUDAL; PERINEURAL at 11:53

## 2022-12-15 RX ADMIN — METHYLPREDNISOLONE ACETATE 80 MG: 40 INJECTION, SUSPENSION INTRA-ARTICULAR; INTRALESIONAL; INTRAMUSCULAR; SOFT TISSUE at 11:53

## 2022-12-15 NOTE — PROGRESS NOTES
Ankle Follow Up      Patient: Sakina Guerrier    YOB: 1965 57 y.o. female    Chief Complaints: Ankle is still sore    History of Present Illness:Patient was seen on 10/31/2022 reporting a 6-month history of some increased pain and swelling in the left hindfoot more at the sinus tarsi than medial hindfoot.  She reported that she had always been somewhat flat-footed and felt that this may have progressed to some degree but not dramatically.  She had been doing some physical therapy as ordered by Dr. Luis for this and also therapy for her hip which she had replaced by   in May.      She also reported history of previous left fifth metatarsal fracture treated operatively and had subsequent hardware removal.  She also had bilateral third interdigital neuroma excisions all the surgeries were performed by podiatry.        She is felt to have some exacerbation of pes planus with possible posterior tib tendon dysfunction with sinus tarsi impingement and symptomatic accessory navicular.    She is instructed on heel cord stretching exercises and fitted with a PT TD brace and sent for MRI.    She is seen back today reporting that she still has some pain in the ankle mostly in the sinus tarsi more so than any medially.   HPI    ROS: ankle pain  Past Medical History:   Diagnosis Date   • Acne    • Acquired hypothyroidism    • Anxiety    • Cancer (HCC) 09/2007    VULVAR CARCINOMA IN SITU   • Depression    • Endometriosis    • Fear of flying    • Hammertoe of right foot 04/24/2015    4TH RIGHT TOE   • Hemorrhoids    • Hip arthrosis 9 months   • Influenza A 02/06/2018   • Lateral epicondylitis of right elbow 07/26/2013   • Left knee pain 10/19/2017    SAW DR. GHASSAN COLE   • Left ovarian cyst 08/2006    FOLLICLE CYST   • Lesion of left plantar nerve    • Menopause    • Neuroma of foot 5 years    Surgery to correct   • Onychomycosis 03/18/2014   • Primary insomnia    • Rheumatic fever     AS A CHILD   •  "Right hip pain    • Syncope 02/07/2018    SEEN AT [facility]   • Thrombosed hemorrhoids 09/26/2017    SAW DR. FAUZIA GIRON   • Ventricular tachycardia        Physical Exam:   Vitals:    12/15/22 1123   Temp: 97.9 °F (36.6 °C)   Weight: 75.7 kg (166 lb 12.8 oz)   Height: 170.2 cm (67\")     Well developed with normal mood.  On exam she has mild to moderate pes planus left somewhat more so than right.  She had really minimal if any discomfort along the medial ankle at the posterior tib tendon had good inversion strength.  There was little bit of swelling and discomfort in the sinus tarsi.      Radiology: MRI films and report of the left hindfoot dated 11/29/2022 reviewed which showed narrowing of the tarsal sinus posteriorly with loss of fat signal in that area and adjacent mild marrow edema of the calcaneus    Plantar lateral talar cortex abuts the dorsal cortex of the anterior calcaneal process.  Posterior tib tendon maintains normal signal and thickness with no tendon sheath effusion.  There is a 6 mm accessory ossicle within the substance of the distal posterior tib tendon.  Other ankle ligaments and tendons appeared intact at that articular cartilage.      Assessment/Plan: Left pes planus with sinus tarsi impingement  2.  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    We discussed treatment going forward and would not recommend any surgical treatment at this time nor did she desire it (she is leaving in about 5 days to go to [place] for 3 months (and regardless would not recommend any surgical treatment without a trial of nonoperative measures.    We discussed treatment including possible therapy and custom orthotics but again she is leaving in a few days and will be back for several months.    We discussed other measures and after verbal consent and sterile preparation with discussion of risks which can include infection the left sinus tarsi was injected with steroid lidocaine " mixture.    She will continue with heel cord stretching exercises and was fitted with a medial heel wedge and instructed on getting some power step orthotics to help offload the sinus tarsi.    We will see her back in 3 months when she returns with x-rays of her left foot and ankle.  Today how she is doing at that point may get her into some physical therapy and consider custom orthotics prior to any surgical intervention.    Medium Joint Arthrocentesis: L ankle  Date/Time: 12/15/2022 11:53 AM  Consent given by: patient  Site marked: site marked  Timeout: Immediately prior to procedure a time out was called to verify the correct patient, procedure, equipment, support staff and site/side marked as required   Supporting Documentation  Indications: pain   Procedure Details  Location: ankle - L ankle  Preparation: Patient was prepped and draped in the usual sterile fashion  Needle size: 22 G  Approach: anterolateral  Medications administered: 80 mg methylPREDNISolone acetate 40 MG/ML; 3 mL lidocaine PF 1% 1 %  Patient tolerance: patient tolerated the procedure well with no immediate complications

## 2023-02-06 ENCOUNTER — TELEPHONE (OUTPATIENT)
Dept: SURGERY | Facility: CLINIC | Age: 58
End: 2023-02-06
Payer: MEDICAID

## 2023-02-06 NOTE — TELEPHONE ENCOUNTER
Caller: DAREK HURST   Relationship: SELF   Best call back number: [phone]    What form or medical record are you requesting: ALL MED RECS FROM LAST VISIT W/ DR. GIRON     Who is requesting this form or medical record from you: DR. SASCHA SOLOMON'S OFFICE- [phone]    How would you like to receive the form or medical records (pick-up, mail, fax): FAX   If fax, what is the fax number: [phone]    Timeframe paperwork needed: ASAP

## 2023-02-06 NOTE — TELEPHONE ENCOUNTER
Pt has not been seen since 12-5-18, all records are in [facility].  is part of [facility] and has [facility] as well.

## 2023-04-26 ENCOUNTER — OFFICE VISIT (OUTPATIENT)
Dept: INTERNAL MEDICINE | Facility: CLINIC | Age: 58
End: 2023-04-26
Payer: MEDICAID

## 2023-04-26 VITALS
WEIGHT: 164.4 LBS | HEIGHT: 67 IN | DIASTOLIC BLOOD PRESSURE: 71 MMHG | BODY MASS INDEX: 25.8 KG/M2 | OXYGEN SATURATION: 95 % | SYSTOLIC BLOOD PRESSURE: 110 MMHG | HEART RATE: 87 BPM

## 2023-04-26 DIAGNOSIS — F51.04 PSYCHOPHYSIOLOGICAL INSOMNIA: Primary | ICD-10-CM

## 2023-04-26 DIAGNOSIS — F51.04 PSYCHOPHYSIOLOGICAL INSOMNIA: ICD-10-CM

## 2023-04-26 DIAGNOSIS — Z79.899 ENCOUNTER FOR LONG-TERM (CURRENT) USE OF MEDICATIONS: ICD-10-CM

## 2023-04-26 DIAGNOSIS — E78.49 OTHER HYPERLIPIDEMIA: ICD-10-CM

## 2023-04-26 DIAGNOSIS — K21.9 GASTROESOPHAGEAL REFLUX DISEASE WITHOUT ESOPHAGITIS: Primary | ICD-10-CM

## 2023-04-26 DIAGNOSIS — F41.9 ANXIETY: ICD-10-CM

## 2023-04-26 DIAGNOSIS — R00.2 PALPITATIONS: ICD-10-CM

## 2023-04-26 RX ORDER — FAMOTIDINE 20 MG/1
20 TABLET, FILM COATED ORAL 2 TIMES DAILY PRN
Qty: 180 TABLET | Refills: 1 | Status: SHIPPED | OUTPATIENT
Start: 2023-04-26

## 2023-04-26 NOTE — PROGRESS NOTES
"Chief Complaint  Follow-up and Heartburn    Subjective        Sakina Guerrier presents to [facility]  History of Present Illness  This is a 57 y/o female presenting to office for f/u with chronic health conditions and complaints of acid reflux.     Acid reflux-- reports her symptoms restarted about 5 months ago; reports she has been using tums PRN; reports this is not helping. Notices symptoms increase at night. Patient had previously been on pepcid.     Hx anxiety-- continues on paxil; reports mood is stable. Denies SI.     Hx insomnia-- continues on ambien; reports taking this mostly when she travels. Patient denies any issues with medication.     Hx heart palpitations-- continues on atenolol. Reports this is well controlled.       Objective   Vital Signs:  /71 (BP Location: Right arm, Patient Position: Sitting, Cuff Size: Adult)   Pulse 87   Ht 170.2 cm (67\")   Wt 74.6 kg (164 lb 6.4 oz)   SpO2 95%   BMI 25.75 kg/m²   Estimated body mass index is 25.75 kg/m² as calculated from the following:    Height as of this encounter: 170.2 cm (67\").    Weight as of this encounter: 74.6 kg (164 lb 6.4 oz).             Physical Exam  Constitutional:       Appearance: Normal appearance.   HENT:      Head: Normocephalic and atraumatic.      Right Ear: External ear normal.      Left Ear: External ear normal.      Nose: Nose normal.   Eyes:      Conjunctiva/sclera: Conjunctivae normal.      Pupils: Pupils are equal, round, and reactive to light.   Neck:      Vascular: No carotid bruit.   Cardiovascular:      Rate and Rhythm: Normal rate and regular rhythm.      Pulses: Normal pulses.      Heart sounds: Normal heart sounds. No murmur heard.    No gallop.   Pulmonary:      Effort: Pulmonary effort is normal. No respiratory distress.      Breath sounds: Normal breath sounds. No wheezing or rales.   Musculoskeletal:      Cervical back: Normal range of motion and neck supple.   Skin:     General: " Skin is warm and dry.      Capillary Refill: Capillary refill takes less than 2 seconds.   Neurological:      General: No focal deficit present.      Mental Status: She is alert and oriented to person, place, and time. Mental status is at baseline.   Psychiatric:         Mood and Affect: Mood normal.         Thought Content: Thought content normal.         Judgment: Judgment normal.        Result Review :  The following data was reviewed by: RANDA Stanford on 04/26/2023:  Common labs        5/12/2022    05:06 11/7/2022    10:11   Common Labs   Glucose  97     BUN  14     Creatinine  0.78     Sodium  139     Potassium  4.6     Chloride  104     Calcium  9.1     Total Protein  6.9     Albumin  4.20     Total Bilirubin  0.2     Alkaline Phosphatase  91     AST (SGOT)  19     ALT (SGPT)  16     WBC  7.29     Hemoglobin 10.5   12.9     Hematocrit 32.4   38.8     Platelets  240     Total Cholesterol  175     Triglycerides  54     HDL Cholesterol  56     LDL Cholesterol   108     Hemoglobin A1C  5.70                    Assessment and Plan   Diagnoses and all orders for this visit:    1. Gastroesophageal reflux disease without esophagitis (Primary)  Assessment & Plan:  Start famotidine 20mg daily.   Can use 2nd dose at night if needed.   Stop food intake 2-3 hours before bedtime.   Handout provided.     Orders:  -     famotidine (Pepcid) 20 MG tablet; Take 1 tablet by mouth 2 (Two) Times a Day As Needed for Heartburn.  Dispense: 180 tablet; Refill: 1    2. Palpitations  Assessment & Plan:  Continues on atenolol.       3. Other hyperlipidemia  Assessment & Plan:  Lipid abnormalities are improving with lifestyle modifications.  Nutritional counseling was provided.  Lipids will be reassessed in 6 months.      4. Psychophysiological insomnia  Assessment & Plan:  Continues on ambien sparingly   Contract [facility] today     Orders:  -     Cancel: Compliance Drug Analysis, Ur - Urine, Clean Catch    5. Anxiety  Assessment &  Plan:  Continues on paxil.   Reports stable             Follow Up   Return in about 6 months (around 10/26/2023) for Annual physical.  Patient was given instructions and counseling regarding her condition or for health maintenance advice. Please see specific information pulled into the AVS if appropriate.

## 2023-04-26 NOTE — ASSESSMENT & PLAN NOTE
Start famotidine 20mg daily.   Can use 2nd dose at night if needed.   Stop food intake 2-3 hours before bedtime.   Handout provided.

## 2023-04-28 DIAGNOSIS — F41.9 ANXIETY: Chronic | ICD-10-CM

## 2023-04-28 RX ORDER — PAROXETINE HYDROCHLORIDE 40 MG/1
TABLET, FILM COATED ORAL
Qty: 90 TABLET | Refills: 1 | Status: SHIPPED | OUTPATIENT
Start: 2023-04-28

## 2023-04-28 NOTE — TELEPHONE ENCOUNTER
Rx Refill Note  Requested Prescriptions     Pending Prescriptions Disp Refills   • PARoxetine (PAXIL) 40 MG tablet [Pharmacy Med Name: PARoxetine HCL 40 MG TABLET] 90 tablet 1     Sig: TAKE ONE TABLET BY MOUTH EVERY MORNING      Last office visit with prescribing clinician: 4/26/2023   Last telemedicine visit with prescribing clinician: Visit date not found   Next office visit with prescribing clinician: 11/8/2023                         Would you like a call back once the refill request has been completed: [] Yes [] No    If the office needs to give you a call back, can they leave a voicemail: [] Yes [] No    Tiffanie Marques  04/28/23, 10:23 EDT

## 2023-05-02 LAB — DRUGS UR: NORMAL

## 2023-08-14 ENCOUNTER — OFFICE VISIT (OUTPATIENT)
Dept: ORTHOPEDIC SURGERY | Facility: CLINIC | Age: 58
End: 2023-08-14
Payer: MEDICAID

## 2023-08-14 VITALS — HEIGHT: 67 IN | WEIGHT: 167.6 LBS | BODY MASS INDEX: 26.3 KG/M2 | TEMPERATURE: 97.3 F

## 2023-08-14 DIAGNOSIS — M25.872 IMPINGEMENT SYNDROME OF LEFT ANKLE: ICD-10-CM

## 2023-08-14 DIAGNOSIS — M76.829 POSTERIOR TIBIAL TENDON DYSFUNCTION: ICD-10-CM

## 2023-08-14 DIAGNOSIS — M25.572 SINUS TARSITIS OF LEFT FOOT: Primary | ICD-10-CM

## 2023-08-14 DIAGNOSIS — R52 PAIN: ICD-10-CM

## 2023-08-14 RX ADMIN — LIDOCAINE HYDROCHLORIDE 3 ML: 10 INJECTION, SOLUTION EPIDURAL; INFILTRATION; INTRACAUDAL; PERINEURAL at 11:38

## 2023-08-14 RX ADMIN — METHYLPREDNISOLONE ACETATE 80 MG: 80 INJECTION, SUSPENSION INTRA-ARTICULAR; INTRALESIONAL; INTRAMUSCULAR; SOFT TISSUE at 11:38

## 2023-08-14 NOTE — PROGRESS NOTES
Ankle Follow Up      Patient: Sakina Guerrier    YOB: 1965 58 y.o. female    Chief Complaints: Ankle is sore again    History of Present Illness::Patient was seen on 10/31/2022 reporting a 6-month history of some increased pain and swelling in the left hindfoot more at the sinus tarsi than medial hindfoot.  She reported that she had always been somewhat flat-footed and felt that this may have progressed to some degree but not dramatically.  She had been doing some physical therapy as ordered by Dr. Luis for this and also therapy for her hip which she had replaced by   in May.      She also reported history of previous left fifth metatarsal fracture treated operatively and had subsequent hardware removal.  She also had bilateral third interdigital neuroma excisions all the surgeries were performed by podiatry.         She was felt to have some exacerbation of pes planus with possible posterior tib tendon dysfunction with sinus tarsi impingement and symptomatic accessory navicular.     She is instructed on heel cord stretching exercises and fitted with a PT TD brace and sent for MRI.     Patient was seen on 12/15/2022 reporting that she still has some pain in the ankle mostly in the sinus tarsi more so than any medially.    We reviewed her MRI and she was felt to have lateral impingement in the sinus tarsi with a sensory navicular with some posterior tib tendon dysfunction.  She did not desire any surgical treatment nor was any recommended at that time.  She was leaving in a few days to go to [place] for 3 months.  We decided to hold off on therapy or custom orthotics with her leaving soon and discussed other measures.  She is instructed on obtaining power step orthotics and fitted with a medial heel wedge and sinus tarsi was injected.  She was to been seen back 3 months thereafter    She has not been seen back until today but returned from [place] in April.  She has been busy over the  "summer.  She has been using the power step orthotics and will wedge some but only doing any heel cord stretching exercises.  She reports that the injection did help but was not long-lasting.  HPI    ROS: ankle pain  Past Medical History:   Diagnosis Date    Acne     Acquired hypothyroidism     Anxiety     Cancer 09/2007    VULVAR CARCINOMA IN SITU    Depression     Endometriosis     Fear of flying     Hammertoe of right foot 04/24/2015    4TH RIGHT TOE    Hemorrhoids     Hip arthrosis 9 months    Influenza A 02/06/2018    Lateral epicondylitis of right elbow 07/26/2013    Left knee pain 10/19/2017    SAW DR. GHASSAN COLE    Left ovarian cyst 08/2006    FOLLICLE CYST    Lesion of left plantar nerve     Menopause     Neuroma of foot 5 years    Surgery to correct    Onychomycosis 03/18/2014    Primary insomnia     Rheumatic fever     AS A CHILD    Right hip pain     Syncope 02/07/2018    SEEN AT [facility]    Thrombosed hemorrhoids 09/26/2017    SAW DR. FAUZIA GIRON    Ventricular tachycardia        Physical Exam:   Vitals:    08/14/23 1108   Temp: 97.3 øF (36.3 øC)   Weight: 76 kg (167 lb 9.6 oz)   Height: 170.2 cm (67\")   PainSc:   4     Well developed with normal mood.  She is in sandals she has moderate pes planus.  There is moderate discomfort along the sinus tarsi and only mild discomfort of the medial aspect of the ankle.  She had decent inversion strength.      Radiology: 3 views of the left ankle including lateral view of the foot and 2 views left foot ordered evaluate pain and alignment reviewed and compared to previous x-rays I do not see any gross change in alignment compared with previous x-rays there remains a small accessory navicular and overall talus is relatively well covered with only be 15% uncovering medially.  No obvious fracture there is some mild arthritis to the midfoot at the second TMT joint.  Ankle shows good alignment of the talus within the mortise there is some " loss of subtalar joint space with some lateral hindfoot impingement.    MRI films and report of the left hindfoot dated 11/29/2022 reviewed which showed narrowing of the tarsal sinus posteriorly with loss of fat signal in that area and adjacent mild marrow edema of the calcaneus     Plantar lateral talar cortex abuts the dorsal cortex of the anterior calcaneal process.  Posterior tib tendon maintains normal signal and thickness with no tendon sheath effusion.  There is a 6 mm accessory ossicle within the substance of the distal posterior tib tendon.  Other ankle ligaments and tendons appeared intact at that articular cartilage.      Assessment/Plan:  Left pes planus with sinus tarsi impingement  2.  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    Although injection to give some relief she has had recurrent exacerbation of symptoms.  We discussed treatment going forward and she does not desire any surgical treatment nor do recommend any at this time    She was instructed on increasing her heel cord stretching exercises and discussed etiology of heel cord tightness with associated pes planus    She will continue with her power step orthotic with medial heel wedge more regularly.  We discussed custom orthotics which she wants to hold off on at this time    She was prescribed compounding cream and referred to see Augusto [facility] for physical therapy    She requested additional injection which I think is reasonable    After verbal consent and sterile preparation with discussion of risks which can include infection the area of the left sinus tarsi was injected with steroid lidocaine mixture    We will see her back in 6 to 8 weeks.    Medium Joint Arthrocentesis: L ankle  Date/Time: 8/14/2023 11:38 AM  Consent given by: patient  Site marked: site marked  Timeout: Immediately prior to procedure a time out was called to verify the correct patient, procedure, equipment, support staff and site/side  marked as required   Supporting Documentation  Indications: pain   Procedure Details  Location: ankle - L ankle  Preparation: Patient was prepped and draped in the usual sterile fashion  Needle size: 22 G  Approach: anterolateral  Medications administered: 80 mg methylPREDNISolone acetate 80 MG/ML; 3 mL lidocaine PF 1% 1 %  Patient tolerance: patient tolerated the procedure well with no immediate complications

## 2023-08-15 ENCOUNTER — TELEPHONE (OUTPATIENT)
Dept: ORTHOPEDIC SURGERY | Facility: CLINIC | Age: 58
End: 2023-08-15
Payer: MEDICAID

## 2023-08-15 NOTE — TELEPHONE ENCOUNTER
89 year old female from Assisted Living facility wit a medical history of CHF, Hypertension, Peripheral vascular disease ( on eliquis ) s/p LAKA, s/p Cardiac pacemaker placement and chronic right leg ulcers.  Patient was sent tot he ED from her Nursing home with complaints of SOB and increasing right leg swelling.   Patient reports being SOB for the past couple of days with mucous production, along with a decrease in her appetite and diarrhea.  She denied any accompanying abdominal pain, chest pain, nausea, vomiting, fevers and chills.       Please advise - message from the HUB:  [facility] DOESN'T TAKE PATIENT'S INSURANCE FOR ORTHOTICS. IS PT OKAY?

## 2023-08-16 RX ORDER — LIDOCAINE HYDROCHLORIDE 10 MG/ML
3 INJECTION, SOLUTION EPIDURAL; INFILTRATION; INTRACAUDAL; PERINEURAL
Status: COMPLETED | OUTPATIENT
Start: 2023-08-14 | End: 2023-08-14

## 2023-08-16 RX ORDER — METHYLPREDNISOLONE ACETATE 80 MG/ML
80 INJECTION, SUSPENSION INTRA-ARTICULAR; INTRALESIONAL; INTRAMUSCULAR; SOFT TISSUE
Status: COMPLETED | OUTPATIENT
Start: 2023-08-14 | End: 2023-08-14

## 2023-09-13 ENCOUNTER — HOSPITAL ENCOUNTER (OUTPATIENT)
Dept: PHYSICAL THERAPY | Facility: HOSPITAL | Age: 58
Discharge: HOME | End: 2023-09-13
Admitting: ORTHOPAEDIC SURGERY
Payer: MEDICAID

## 2023-09-13 DIAGNOSIS — G47.8 DIFFICULTY WAKING: ICD-10-CM

## 2023-09-13 DIAGNOSIS — M25.572 CHRONIC PAIN OF LEFT ANKLE: Primary | ICD-10-CM

## 2023-09-13 DIAGNOSIS — M79.672 LEFT FOOT PAIN: ICD-10-CM

## 2023-09-13 DIAGNOSIS — G89.29 CHRONIC PAIN OF LEFT ANKLE: Primary | ICD-10-CM

## 2023-09-13 PROCEDURE — 97162 PT EVAL MOD COMPLEX 30 MIN: CPT

## 2023-09-13 PROCEDURE — 97110 THERAPEUTIC EXERCISES: CPT

## 2023-09-13 NOTE — THERAPY EVALUATION
Outpatient Physical Therapy Ortho Initial Evaluation  [facility]     Patient Name: Sakina Guerrier  : 1965  MRN: [id number]  Today's Date: 2023      Visit Date: 2023    Patient Active Problem List   Diagnosis    Insomnia    Hemorrhoids, external    Menopause syndrome    Primary osteoarthritis of right hip    Palpitations    Ventricular tachycardia, non-sustained    History of rheumatic fever as a child    Anxiety    Annual physical exam    Other hyperlipidemia    Pain associated with accessory navicular bone of foot, left    Posterior tibial tendon dysfunction    Pes planus    Impingement syndrome of left ankle    Sinus tarsitis of left foot    Gastroesophageal reflux disease without esophagitis        Past Medical History:   Diagnosis Date    Acne     Acquired hypothyroidism     Anxiety     Cancer 2007    VULVAR CARCINOMA IN SITU    Depression     Endometriosis     Fear of flying     Hammertoe of right foot 2015    4TH RIGHT TOE    Hemorrhoids     Hip arthrosis 9 months    Influenza A 2018    Lateral epicondylitis of right elbow 2013    Left knee pain 10/19/2017    SAW DR. GHASSAN COLE    Left ovarian cyst 2006    FOLLICLE CYST    Lesion of left plantar nerve     Menopause     Neuroma of foot 5 years    Surgery to correct    Onychomycosis 2014    Primary insomnia     Rheumatic fever     AS A CHILD    Right hip pain     Syncope 2018    SEEN AT [facility]    Thrombosed hemorrhoids 2017    SAW DR. FAUZIA GIRON    Ventricular tachycardia         Past Surgical History:   Procedure Laterality Date    BREAST SURGERY Bilateral 2005    AUGMENTATION MAMMOPLASTY, DR. TONY FERGUSON AT [facility]     SECTION N/A     CHOLECYSTECTOMY N/A 2016    DONE IN [place]    COLONOSCOPY N/A 2017    ENTIRE COLON WNL, RESCOPE IN 5 YRS, DR. FAUZIA GIRON AT [facility]    COLPOSCOPY W/ BIOPSY / CURETTAGE N/A 2007    BX OF PERIANAL LESION, PATH: SEVERE DYSPLASIA,  CARCINOMA IN SITU, DR.REBECCA MICHELLE AT [facility]    ENDOMETRIAL ABLATION N/A 08/01/2006    WITH EUA, LYSIS OF ADHESIONS, AND ASPIRATION OF LEFT OVARIAN FOLLICLE CYST, DR. TONY MICHELLE AT [facility]    FOOT SURGERY Left 2020    plate and pen removal     NEUROMA SURGERY Bilateral 02/10/2016    right third intermetatarsal space; [facility]; Lee James DPM    TOTAL HIP ARTHROPLASTY Right 5/11/2022    Procedure: Right anterior total hip arthroplasty;  Surgeon: Maciel Luis MD;  Location: [facility];  Service: Orthopedics;  Laterality: Right;       Visit Dx:     ICD-10-CM ICD-9-CM   1. Chronic pain of left ankle  M25.572 719.47    G89.29 338.29   2. Left foot pain  M79.672 729.5   3. Difficulty waking  G47.8 780.59          Patient History       Row Name 09/13/23 1100             History    Chief Complaint Pain  -CC      Type of Pain Ankle pain;Foot pain  -CC      Brief Description of Current Complaint Sakina Guerrier is a 58 y.o. female who presents today with chronic L foot and ankle pain. Pt reports pain ongoing for over a year. Pt reports hx of 5th metatarsal fx about 3.5 years ago, treated operatively and had subsequent hardware removal.  She also had bilateral third interdigital neuroma excisions all the surgeries were performed by podiatry. Reports following her fracture and initially surgery had a lot of personal issues going on and couldn't perform adequate physical therapy at the time. Was seen her for eval for same issue last year, but unable to continue PT at the time as she went to [place] for the winter. Sakina Guerrier reports difficulty/increased pain with standing for long time at work, walking/running. Pain relieving factors include steroid injection and gastroc stretch against the wall. Dr. Pride gave pt an aircase to wear one work days and wears over-the-counter orthotics. Pt is wearing [employer] tennis shoes today that are only about 4 months old, reports she wears these to work. PMH includes R NICOLASA about 1  year ago.  -CC      Hand Dominance right-handed  -CC      Occupation/sports/leisure activities works in retail  -CC      Patient seeing anyone else for problem(s)? yes  -CC      What clinical tests have you had for this problem? X-ray;MRI  -CC      Results of Clinical Tests MRI from 11/2022: Subtalar impingement morphology around the tarsal sinus. The  tibialis posterior tendon appears morphologically normal  -CC      History of Previous Related Injuries She also reported history of previous left fifth metatarsal fracture treated operatively and had subsequent hardware removal.  She also had bilateral third interdigital neuroma excisions all the surgeries were performed by podiatry.  -CC         Pain     Pain Location Ankle  -CC      Pain at Present 6  -CC      Pain at Best 1  -CC      Pain at Worst 8  -CC      Tolerance Time- Standing has to stand for 6 hours at work, but instant onset of pain when standing  -CC         Fall Risk Assessment    Any falls in the past year: Yes  -CC      Number of falls reported in the last 12 months 1  about a week ago  -CC      Factors that contributed to the fall: Lost balance  feels it was related to her L ankle  -CC         Daily Activities    Primary Language English  -CC      Does patient have problems with the following? None  -CC      Barriers to learning None  -CC      Pt Participated in POC and Goals Yes  -CC                User Key  (r) = Recorded By, (t) = Taken By, (c) = Cosigned By      Initials Name Provider Type    CC Halima Harp PT Physical Therapist                     PT Ortho       Row Name 09/13/23 1100       Posture/Observations    Foot pronation Left:;Severe;Right:;Moderate;Standing posture  walking  -CC    Pes Planus Right:;Mild;Left:;Moderate  -CC       Foot/Ankle Palpation    Foot/Ankle Palpation? No Tenderness/Abnormality  -CC       General ROM    RT Lower Ext Rt Hip External Rotation;Rt Hip Internal Rotation;Rt Knee Extension/Flexion;Rt Ankle  Dorsiflexion;Rt Ankle Plantarflexion;Rt Ankle Inversion;Rt Ankle Eversion  -CC    LT Lower Ext Lt Hip External Rotation;Lt Hip Internal Rotation;Lt Knee Extension/Flexion;Lt Ankle Dorsiflexion;Lt Ankle Plantarflexion;Lt Ankle Inversion;Lt Ankle Eversion  -CC       Right Lower Ext    Rt Hip External Rotation AROM WFL  -CC    Rt Hip Internal Rotation AROM WFL  -CC    Rt Knee Extension/Flexion AROM WFL  -CC    Rt Ankle Dorsiflexion AROM 10  -CC    Rt Ankle Plantarflexion AROM 50  -CC    Rt Ankle Inversion AROM 40  -CC    Rt Ankle Eversion AROM 20  -CC       Left Lower Ext    Lt Hip External Rotation AROM WFL  -CC    Lt Hip Internal Rotation AROM WFL  -CC    Lt Knee Extension/Flexion AROM WFL  -CC    Lt Ankle Dorsiflexion AROM 10  -CC    Lt Ankle Plantarflexion AROM 50  pain  -CC    Lt Ankle Inversion AROM 40  -CC    Lt Ankle Eversion AROM 20  -CC       MMT (Manual Muscle Testing)    Rt Lower Ext Rt Hip Flexion;Rt Hip Extension;Rt Hip ABduction;Rt Knee Extension;Rt Knee Flexion;Rt Ankle Plantarflexion;Rt Ankle Dorsiflexion;Rt Ankle Subtalar Inversion;Rt Ankle Subtalar Eversion;Rt MTP Extension  -CC    Lt Lower Ext Lt Hip Flexion;Lt Hip Extension;Lt Hip ABduction;Lt Knee Extension;Lt Knee Flexion;Lt Ankle Plantarflexion;Lt Ankle Dorsiflexion;Lt Ankle Subtalar Inversion;Lt Ankle Subtalar Eversion;Lt MTP Extension  -CC       MMT Right Lower Ext    Rt Hip Flexion MMT, Gross Movement (5/5) normal  -CC    Rt Hip Extension MMT, Gross Movement (3+/5) fair plus  -CC    Rt Hip ABduction MMT, Gross Movement (3+/5) fair plus  -CC    Rt Knee Extension MMT, Gross Movement (5/5) normal  -CC    Rt Knee Flexion MMT, Gross Movement (4/5) good  -CC    Rt Ankle Plantarflexion MMT, Gross Movement (4+/5) good plus  -CC    Rt Ankle Dorsiflexion MMT, Gross Movement (5/5) normal  -CC    Rt Ankle Subtalar Inversion MT, Gross Movement (5/5) normal  -CC    Rt Ankle Subtalar Eversion MMT, Gross Movement (5/5) normal  -CC    Rt MTP Extension MMT,  Gross Movement (4+/5) good plus  -CC    Rt Lower Extremity Comments  R ankle WB DF 4 inches from wall  -CC       MMT Left Lower Ext    Lt Hip Flexion MMT, Gross Movement (5/5) normal  -CC    Lt Hip Extension MMT, Gross Movement (3+/5) fair plus  -CC    Lt Hip ABduction MMT, Gross Movement (3+/5) fair plus  -CC    Lt Knee Extension MMT, Gross Movement (5/5) normal  -CC    Lt Knee Flexion MMT, Gross Movement (4/5) good  -CC    Lt Ankle Plantarflexion MMT, Gross Movement (4-/5) good minus  -CC    Lt Ankle Dorsiflexion MMT, Gross Movement (5/5) normal  -CC    Lt Ankle Subtalar Inversion MMT, Gross Movement (4/5) good  -CC    Lt Ankle Subtalar Eversion MMT, Gross Movement (4/5) good  -CC    Lt MTP Extension MMT, Gross Movement (4-/5) good minus  -CC    Lt Lower Extremity Comments  L ankle WB DF 4 inches from wall  -CC       Sensation    Sensation WNL? WNL  -CC       Lower Extremity Flexibility    Hip External Rotators Bilateral:;Mildly limited  -CC    Gastrocnemius WNL  -CC    Soleus WNL  -CC       Pathomechanics    Pathomechanics Comments DL squat x5: nida genu valgus severe, knees over toes, feet ER nida  -CC       Balance Skills Training    SLS L: unable after multiple attempts, hopping strategy; R: 9 sec on second attempt with mild hip strategy  -CC       Gait/Stairs (Locomotion)    Comment, (Gait/Stairs) nida hyperpronation L>R; feet nida ER L>R  -CC              User Key  (r) = Recorded By, (t) = Taken By, (c) = Cosigned By      Initials Name Provider Type    Halima Adler, PT Physical Therapist                                Therapy Education  Education Details: Access Code [email or web address]; eval findings, POC, recommendation for custom orthotics  Given: HEP, Symptoms/condition management, Pain management, Posture/body mechanics  Program: New  How Provided: Verbal, Demonstration, Written  Provided to: Patient  Level of Understanding: Teach back education performed, Verbalized, Demonstrated      PT OP Goals       Row  Name 09/13/23 1200          PT Short Term Goals    STG Date to Achieve 10/13/23  -     STG 1 Pt will demonstrate understanding and compliance with initial HEP.  -CC     STG 1 Progress New  -     STG 2 Pt will acquire appropriately fitted arch support and improved footwear for work day.  -CC     STG 2 Progress New  -     STG 3 Pt will demonstrate L foot SLS 10 seconds or greater.  -     STG 3 Progress New  -        Long Term Goals    LTG Date to Achieve 11/12/23  -     LTG 1 Pt will demo L ankle eversion and inversion strength and L great toe ext strength at least 4+/5 MMT to improve ankle stability in standing.  -CC     LTG 1 Progress New  -     LTG 2 Pt will report 50% improvement in tolerance to workday.  -CC     LTG 2 Progress New  -     LTG 3 Pt will be independent with advanced HEP and management of condition.  -CC     LTG 3 Progress New  -     LTG 4 Pt will demo nida hip strength at least 4/5 to improve proximal stability for gait activities.  -     LTG 4 Progress New  -        Time Calculation    PT Goal Re-Cert Due Date 12/12/23  -               User Key  (r) = Recorded By, (t) = Taken By, (c) = Cosigned By      Initials Name Provider Type     Halima Harp, PT Physical Therapist                     PT Assessment/Plan       Row Name 09/13/23 1200          PT Assessment    Functional Limitations Impaired gait;Impaired locomotion;Limitations in community activities;Limitations in functional capacity and performance;Performance in leisure activities;Performance in work activities  -     Impairments Balance;Edema;Endurance;Gait;Impaired muscle endurance;Joint mobility;Muscle strength;Pain;Range of motion;Sensation  -     Assessment Comments Sakina Guerrier is a 58 y.o. female referred to physical therapy for chronic L foot/ankle pain. She presents with a stable clinical presentation, along with no remarkable comorbidities and personal factors of hx of R NICOLASA,  and multiple L foot  surgeries  that may impact her progress in the plan of care. Pt presents today with nida pes planus, increased nida foot pronation L>R in standing and ambulation, decreased L SLS, decreased nida hip strength, and decreased L ankle eversion/inversion/PF and L great toe extension strength . Her signs and symptoms are consistent with referring diagnosis. The previous impairments limit her ability to walk or stand for long periods of time and complete job duties in retail. Pt will benefit from skilled PT to address the previous impairments and return to [facility].  -CC     Please refer to paper survey for additional self-reported information Yes  -CC     Rehab Potential Good  -CC     Patient/caregiver participated in establishment of treatment plan and goals Yes  -CC     Patient would benefit from skilled therapy intervention Yes  -CC        PT Plan    PT Frequency 1x/week;2x/week  -CC     Predicted Duration of Therapy Intervention (PT) 6-10 visits  -CC     Planned CPT's? PT EVAL MOD COMPLELITY: 40767;PT RE-EVAL: 87829;PT THER PROC EA 15 MIN: 22752;PT THER ACT EA 15 MIN: 55587;PT MANUAL THERAPY EA 15 MIN: 54857;PT NEUROMUSC RE-EDUCATION EA 15 MIN: 49964;PT GAIT TRAINING EA 15 MIN: 16245;PT SELF CARE/HOME MGMT/TRAIN EA 15: 13335;PT HOT OR COLD PACK TREAT MCARE;PT ELECTRICAL STIM UNATTEND:   -CC     PT Plan Comments Next visit: bike, response to HEP, add arch doming in seated and standing (?), s/l clam, SL bridge, heel raise. Follow up if pt reaached out to podiatrist about custom orthotics ([facility] does not accept her insurance).  -CC               User Key  (r) = Recorded By, (t) = Taken By, (c) = Cosigned By      Initials Name Provider Type    Halima Adler, PT Physical Therapist                       OP Exercises       Row Name 09/13/23 1200             Total Minutes    45684 - PT Therapeutic Exercise Minutes 10  -CC      90292 - PT Therapeutic Activity Minutes 5  -CC         Exercise 1    Exercise Name 1  bike  -CC      Additional Comments next visit  -CC         Exercise 2    Exercise Name 2 ankle eversion, long sitting  -CC      Cueing 2 Verbal  -CC      Reps 2 10  -CC      Time 2 RTB  -CC         Exercise 3    Exercise Name 3 ankle inversion, long sitting  -CC      Cueing 3 Verbal  -CC      Reps 3 10  -CC      Time 3 RTB  -CC         Exercise 4    Exercise Name 4 s/l hip abd  -CC      Cueing 4 Verbal  -CC      Sets 4 2 nida  -CC      Reps 4 10  -CC         Exercise 5    Exercise Name 5 great toe ext, seated  -CC      Cueing 5 Verbal  -CC      Reps 5 10  -CC         Exercise 6    Exercise Name 6 lesser toe ext, seated  -CC      Cueing 6 Verbal  -CC      Reps 6 10  -CC         Exercise 7    Exercise Name 7 towel scrunch  -CC      Cueing 7 Verbal  -CC      Reps 7 20  -CC         Exercise 8    Exercise Name 8 discussed recommendation for custom orthotics, [facility] does not take her insurance, so discussed reaching out to her podiatrist to see if they do custom orthotics vs. contacting [facility] to discuss self-pay options.  -CC                User Key  (r) = Recorded By, (t) = Taken By, (c) = Cosigned By      Initials Name Provider Type    CC Halima Harp, PT Physical Therapist                                  Outcome Measure Options: FAAM  FAAM  FAAM: 77/84      Time Calculation:     Start Time: 1139  Stop Time: 1217  Time Calculation (min): 38 min  Total Timed Code Minutes- PT: 15 minute(s)  Timed Charges  02857 - PT Therapeutic Exercise Minutes: 10  28518 - PT Therapeutic Activity Minutes: 5  Untimed Charges  PT Eval/Re-eval Minutes: 23  Total Minutes  Timed Charges Total Minutes: 15  Untimed Charges Total Minutes: 23   Total Minutes: 38     Therapy Charges for Today       Code Description Service Date Service Provider Modifiers Qty    22583612266 HC PT THER PROC EA 15 MIN 9/13/2023 Halima Harp, PT GP 1    52574073444 HC PT EVAL MOD COMPLEXITY 2 9/13/2023 Halima Harp, PT GP 1             PT G-Codes  Outcome Measure Options: ROSS Harp, PT  9/13/2023

## 2023-09-15 DIAGNOSIS — F51.04 PSYCHOPHYSIOLOGICAL INSOMNIA: Chronic | ICD-10-CM

## 2023-09-15 RX ORDER — ZOLPIDEM TARTRATE 5 MG/1
TABLET ORAL
Qty: 30 TABLET | Refills: 0 | Status: SHIPPED | OUTPATIENT
Start: 2023-09-15

## 2023-09-15 NOTE — TELEPHONE ENCOUNTER
Rx Refill Note  Requested Prescriptions     Pending Prescriptions Disp Refills    zolpidem (AMBIEN) 5 MG tablet [Pharmacy Med Name: ZOLPIDEM TARTRATE 5 MG TABLET] 30 tablet      Sig: TAKE ONE TABLET BY MOUTH EVERY NIGHT AT BEDTIME AS NEEDED FOR SLEEP      Last office visit with prescribing clinician: 4/26/2023   Last telemedicine visit with prescribing clinician: Visit date not found   Next office visit with prescribing clinician: 11/8/2023                         Would you like a call back once the refill request has been completed: [] Yes [] No    If the office needs to give you a call back, can they leave a voicemail: [] Yes [] No    Tiffanie Marques  09/15/23, 12:11 EDT

## 2023-09-29 DIAGNOSIS — F51.04 PSYCHOPHYSIOLOGICAL INSOMNIA: Chronic | ICD-10-CM

## 2023-09-29 RX ORDER — ZOLPIDEM TARTRATE 5 MG/1
5 TABLET ORAL NIGHTLY PRN
Qty: 30 TABLET | Refills: 0 | Status: SHIPPED | OUTPATIENT
Start: 2023-09-29

## 2023-10-03 DIAGNOSIS — Z96.641 STATUS POST TOTAL REPLACEMENT OF RIGHT HIP: Primary | ICD-10-CM

## 2023-10-03 RX ORDER — CEPHALEXIN 500 MG/1
CAPSULE ORAL
Qty: 4 CAPSULE | Refills: 2 | Status: SHIPPED | OUTPATIENT
Start: 2023-10-03

## 2023-10-06 ENCOUNTER — LAB REQUISITION (OUTPATIENT)
Dept: LAB | Facility: HOSPITAL | Age: 58
End: 2023-10-06
Payer: MEDICAID

## 2023-10-06 ENCOUNTER — OUTSIDE FACILITY SERVICE (OUTPATIENT)
Dept: GASTROENTEROLOGY | Facility: CLINIC | Age: 58
End: 2023-10-06
Payer: MEDICAID

## 2023-10-06 DIAGNOSIS — K21.9 GASTROESOPHAGEAL REFLUX DISEASE WITHOUT ESOPHAGITIS: ICD-10-CM

## 2023-10-06 PROCEDURE — 88305 TISSUE EXAM BY PATHOLOGIST: CPT | Performed by: INTERNAL MEDICINE

## 2023-10-06 RX ORDER — OMEPRAZOLE 40 MG/1
40 CAPSULE, DELAYED RELEASE ORAL
Qty: 60 CAPSULE | Refills: 1 | Status: SHIPPED | OUTPATIENT
Start: 2023-10-06 | End: 2023-12-05

## 2023-10-09 LAB
LAB AP CASE REPORT: NORMAL
PATH REPORT.FINAL DX SPEC: NORMAL
PATH REPORT.GROSS SPEC: NORMAL

## 2023-10-10 NOTE — PROGRESS NOTES
Mild nonspecific inflammation seen on stomach biopsy.    Pantoprazole twice daily x 8 weeks then once daily  Schedule 6-8 week f/u

## 2023-10-11 ENCOUNTER — TELEPHONE (OUTPATIENT)
Dept: GASTROENTEROLOGY | Facility: CLINIC | Age: 58
End: 2023-10-11
Payer: MEDICAID

## 2023-10-11 NOTE — TELEPHONE ENCOUNTER
----- Message from Lay Platt MD sent at 10/10/2023  3:54 PM EDT -----  Mild nonspecific inflammation seen on stomach biopsy.    Pantoprazole twice daily x 8 weeks then once daily  Schedule 6-8 week f/u

## 2023-10-11 NOTE — TELEPHONE ENCOUNTER
It is noted that this has seen their message from Dr Platt  My chart message to pt to schedule fu

## 2023-11-08 ENCOUNTER — OFFICE VISIT (OUTPATIENT)
Dept: INTERNAL MEDICINE | Facility: CLINIC | Age: 58
End: 2023-11-08
Payer: MEDICAID

## 2023-11-08 VITALS
DIASTOLIC BLOOD PRESSURE: 60 MMHG | HEIGHT: 67 IN | SYSTOLIC BLOOD PRESSURE: 102 MMHG | BODY MASS INDEX: 26.37 KG/M2 | HEART RATE: 68 BPM | WEIGHT: 168 LBS | OXYGEN SATURATION: 97 %

## 2023-11-08 DIAGNOSIS — K64.4 HEMORRHOIDS, EXTERNAL: Chronic | ICD-10-CM

## 2023-11-08 DIAGNOSIS — K21.9 GASTROESOPHAGEAL REFLUX DISEASE WITHOUT ESOPHAGITIS: Chronic | ICD-10-CM

## 2023-11-08 DIAGNOSIS — Z00.00 ANNUAL PHYSICAL EXAM: Primary | ICD-10-CM

## 2023-11-08 DIAGNOSIS — F51.04 PSYCHOPHYSIOLOGICAL INSOMNIA: Chronic | ICD-10-CM

## 2023-11-08 DIAGNOSIS — R00.2 PALPITATIONS: Chronic | ICD-10-CM

## 2023-11-08 DIAGNOSIS — E78.49 OTHER HYPERLIPIDEMIA: Chronic | ICD-10-CM

## 2023-11-08 DIAGNOSIS — F41.9 ANXIETY: Chronic | ICD-10-CM

## 2023-11-08 PROCEDURE — 99396 PREV VISIT EST AGE 40-64: CPT | Performed by: NURSE PRACTITIONER

## 2023-11-08 PROCEDURE — 1160F RVW MEDS BY RX/DR IN RCRD: CPT | Performed by: NURSE PRACTITIONER

## 2023-11-08 PROCEDURE — 1159F MED LIST DOCD IN RCRD: CPT | Performed by: NURSE PRACTITIONER

## 2023-11-08 RX ORDER — TERBINAFINE HYDROCHLORIDE 250 MG/1
250 TABLET ORAL DAILY
COMMUNITY

## 2023-11-08 RX ORDER — OMEPRAZOLE 40 MG/1
40 CAPSULE, DELAYED RELEASE ORAL 2 TIMES DAILY
COMMUNITY

## 2023-11-08 NOTE — PROGRESS NOTES
"Chief Complaint  Annual Exam    Subjective        Sakina Guerrier presents to [facility]  History of Present Illness  This is 57 y/o female presenting to office for annual exam.     Reports no formal exercise. Trying to follow healthy diet choices.     Denies tobacco use. Deneis alcohol use.     Pap smear UTD 10/2022. Mammogram scheduled this month. Colonoscopy 2017.    Following with GI for GERD- had recent EGD and dilation; continues on omeprazole BID; reports feeling better.    Continues on atenolol for hx heart palpitations; reports stable.     Hx anxiety-- continues on paxil; stable. Denies SI.     Reports completing lamisil for toenail fungus.     UTD with dental exam.     Patient continues using ambien sparingly for travel. Denies any issues with medication. Contract on file; PDMP reviewed.     Objective   Vital Signs:  /60 (BP Location: Left arm, Patient Position: Sitting, Cuff Size: Adult)   Pulse 68   Ht 170.2 cm (67\")   Wt 76.2 kg (168 lb)   SpO2 97%   BMI 26.31 kg/m²   Estimated body mass index is 26.31 kg/m² as calculated from the following:    Height as of this encounter: 170.2 cm (67\").    Weight as of this encounter: 76.2 kg (168 lb).               Physical Exam  Constitutional:       General: She is awake.      Appearance: Normal appearance.   HENT:      Head: Normocephalic and atraumatic.      Right Ear: Hearing, tympanic membrane, ear canal and external ear normal.      Left Ear: Hearing, tympanic membrane, ear canal and external ear normal.      Nose: Nose normal.      Mouth/Throat:      Lips: Pink.      Mouth: Mucous membranes are moist.      Pharynx: Oropharynx is clear.   Eyes:      Extraocular Movements: Extraocular movements intact.      Conjunctiva/sclera: Conjunctivae normal.      Pupils: Pupils are equal, round, and reactive to light.   Neck:      Vascular: No carotid bruit.   Cardiovascular:      Rate and Rhythm: Normal rate and regular rhythm.      " Pulses: Normal pulses.      Heart sounds: Normal heart sounds. No murmur heard.     No gallop.   Pulmonary:      Effort: Pulmonary effort is normal. No respiratory distress.      Breath sounds: Normal breath sounds. No stridor. No wheezing, rhonchi or rales.   Abdominal:      General: Abdomen is flat. Bowel sounds are normal. There is no distension.      Palpations: Abdomen is soft.      Tenderness: There is no abdominal tenderness.   Musculoskeletal:         General: No swelling. Normal range of motion.      Cervical back: Normal range of motion and neck supple.   Skin:     General: Skin is warm and dry.      Capillary Refill: Capillary refill takes less than 2 seconds.      Coloration: Skin is not jaundiced.   Neurological:      General: No focal deficit present.      Mental Status: She is alert and oriented to person, place, and time. Mental status is at baseline.      Motor: Motor function is intact.      Coordination: Coordination is intact.      Gait: Gait is intact.      Deep Tendon Reflexes: Reflexes are normal and symmetric.   Psychiatric:         Attention and Perception: Attention normal.         Mood and Affect: Mood normal.         Speech: Speech normal.         Behavior: Behavior normal. Behavior is cooperative.         Thought Content: Thought content normal.         Cognition and Memory: Cognition normal.         Judgment: Judgment normal.        Result Review :  The following data was reviewed by: RANDA Stanford on 11/08/2023:                 Assessment and Plan   Diagnoses and all orders for this visit:    1. Annual physical exam (Primary)  Assessment & Plan:  Recommended 150-300 minutes weekly exercise.   Continue with healthy diet choices according to USDA food guidance.   Labs ordered.   Mammo/Pap smear UTD.   Continue with monthly self breast examinations.   Anticipatory guidance given regarding health prevention/wellness, diet/exercise, tobacco/alcohol/drug education, exercise and  wellbeing, covid 19 guidance, and sexual health/STD education.     Orders:  -     CBC & Differential; Future  -     Comprehensive metabolic panel; Future    2. Anxiety  Assessment & Plan:  Stable on paxil       3. Psychophysiological insomnia  Assessment & Plan:  PDMP reviewed  Continues on ambien sparingly for travel  Contract on file  UDS UTD        4. Palpitations  Assessment & Plan:  Continues on atenolol daily      5. Other hyperlipidemia  Assessment & Plan:  Lipid abnormalities are unchanged.  Nutritional counseling was provided.  Lipids will be reassessed in 6 months.    Orders:  -     TSH Rfx On Abnormal To Free T4; Future  -     Lipid panel; Future    6. Gastroesophageal reflux disease without esophagitis  Assessment & Plan:  Stable  On omeprazole 40mg BID  Following with GI      7. Hemorrhoids, external  Assessment & Plan:  Using prep H PRN               Follow Up   Return in about 6 months (around 5/8/2024) for Recheck insomnia.  Patient was given instructions and counseling regarding her condition or for health maintenance advice. Please see specific information pulled into the AVS if appropriate.

## 2023-11-13 DIAGNOSIS — K21.9 GASTROESOPHAGEAL REFLUX DISEASE WITHOUT ESOPHAGITIS: ICD-10-CM

## 2023-11-13 DIAGNOSIS — F41.9 ANXIETY: Chronic | ICD-10-CM

## 2023-11-13 RX ORDER — PAROXETINE HYDROCHLORIDE 40 MG/1
TABLET, FILM COATED ORAL
Qty: 90 TABLET | Refills: 1 | Status: SHIPPED | OUTPATIENT
Start: 2023-11-13

## 2023-11-13 RX ORDER — FAMOTIDINE 20 MG/1
20 TABLET, FILM COATED ORAL 2 TIMES DAILY PRN
Qty: 180 TABLET | Refills: 1 | OUTPATIENT
Start: 2023-11-13

## 2023-11-29 ENCOUNTER — PROCEDURE VISIT (OUTPATIENT)
Dept: OBSTETRICS AND GYNECOLOGY | Facility: CLINIC | Age: 58
End: 2023-11-29
Payer: MEDICAID

## 2023-11-29 ENCOUNTER — OFFICE VISIT (OUTPATIENT)
Dept: OBSTETRICS AND GYNECOLOGY | Facility: CLINIC | Age: 58
End: 2023-11-29
Payer: MEDICAID

## 2023-11-29 VITALS
DIASTOLIC BLOOD PRESSURE: 60 MMHG | BODY MASS INDEX: 25.77 KG/M2 | WEIGHT: 164.2 LBS | HEIGHT: 67 IN | SYSTOLIC BLOOD PRESSURE: 98 MMHG

## 2023-11-29 DIAGNOSIS — Z12.31 VISIT FOR SCREENING MAMMOGRAM: Primary | ICD-10-CM

## 2023-11-29 DIAGNOSIS — Z01.419 WELL WOMAN EXAM WITH ROUTINE GYNECOLOGICAL EXAM: Primary | ICD-10-CM

## 2023-11-29 DIAGNOSIS — Z78.0 POSTMENOPAUSAL STATUS: ICD-10-CM

## 2023-11-29 DIAGNOSIS — Z12.31 BREAST CANCER SCREENING BY MAMMOGRAM: ICD-10-CM

## 2023-11-29 NOTE — PROGRESS NOTES
GYN Annual Exam     CC- Here for annual exam.     Sakina Guerrier is a 58 y.o. female who presents for annual well woman exam. She is postmenopausal. She denies vaginal bleeding or vasomotor symptoms. She has no complaints today.      OB History          2    Para   2    Term                AB        Living             SAB        IAB        Ectopic        Molar        Multiple        Live Births                G1- C/S   G2-      Menopause- 53 years old    Current contraception: post menopausal status  History of abnormal Pap smear: yes - 4 years ago - HPV positive and underwent colposcopy   Family history of uterine, colon or ovarian cancer: yes - MGF diagnosed with colon cancer in his 60s  History of abnormal mammogram: no  Family history of breast cancer: yes - maternal aunt diagnosed at 50  Last Pap : 10/11/22- NILM, negative HPV   Last Completed Pap Smear            PAP SMEAR (Every 3 Years) Next due on 10/11/2025      10/11/2022  IGP, Apt HPV,rfx 16 / 18,45    2019  Done                   Last mammogram: 10/11/22- BIRADS-2   Last Completed Mammogram            Ordered - MAMMOGRAM (Every 2 Years) Ordered on 2023      10/11/2022  Mammo Screening Digital Tomosynthesis Bilateral With CAD    2019  Done                   Last colonoscopy:  17- follow up in 10 years   Last Completed Colonoscopy            COLORECTAL CANCER SCREENING (COLONOSCOPY - Every 10 Years) Next due on 2017  Surgical Procedure: COLONOSCOPY    2017  COLONOSCOPY                   Last DEXA: normal 3-4 years ago; Frax score 6.3%, 0.4%   Parental Hip Fracture: denies     Past Medical History:   Diagnosis Date    Acne     Acquired hypothyroidism     Anxiety     Cancer 2007    VULVAR CARCINOMA IN SITU    Cholelithiasis 2016    Gallbladder removed    Colon polyp     Found last colonoscopy    Depression     Endometriosis     Fear of flying     GERD (gastroesophageal reflux  disease)     Hammertoe of right foot 2015    4TH RIGHT TOE    Hemorrhoids     Hip arthrosis 9 months    Influenza A 2018    Lateral epicondylitis of right elbow 2013    Left knee pain 10/19/2017    SAW DR. GHASSAN COLE    Left ovarian cyst 2006    FOLLICLE CYST    Lesion of left plantar nerve     Menopause     Neuroma of foot 5 years    Surgery to correct    Onychomycosis 2014    Primary insomnia     Rheumatic fever     AS A CHILD    Right hip pain     Syncope 2018    SEEN AT [facility]    Thrombosed hemorrhoids 2017    SAW DR. FAUZIA GIRON    Ventricular tachycardia        Past Surgical History:   Procedure Laterality Date    BREAST SURGERY Bilateral 2005    AUGMENTATION MAMMOPLASTY, DR. TONY FERGUSON AT [facility]     SECTION N/A     CHOLECYSTECTOMY N/A     DONE IN [place]    COLONOSCOPY N/A 2017    ENTIRE COLON WNL, RESCOPE IN 5 YRS, DR. FAUZIA GIRON AT [facility]    COLPOSCOPY W/ BIOPSY / CURETTAGE N/A 2007    BX OF PERIANAL LESION, PATH: SEVERE DYSPLASIA, CARCINOMA IN SITU, DR.REBECCA MICHELLE AT [facility]    ENDOMETRIAL ABLATION N/A 2006    WITH EUA, LYSIS OF ADHESIONS, AND ASPIRATION OF LEFT OVARIAN FOLLICLE CYST, DR. TONY MICHELLE AT [facility]    FOOT SURGERY Left 2020    plate and pen removal     NEUROMA SURGERY Bilateral 02/10/2016    right third intermetatarsal space; [facility]; Lee James DPM    TOTAL HIP ARTHROPLASTY Right 2022    Procedure: Right anterior total hip arthroplasty;  Surgeon: Maciel Luis MD;  Location: [facility];  Service: Orthopedics;  Laterality: Right;         Current Outpatient Medications:     atenolol (TENORMIN) 25 MG tablet, TAKE ONE TABLET BY MOUTH DAILY, Disp: 90 tablet, Rfl: 3    cephalexin (KEFLEX) 500 MG capsule, TAKE 4 CAPSULES BY MOUTH 1 HOUR PRIOR TO DENTAL CLEANING OR PROCEDURE, Disp: 4 capsule, Rfl: 2    cycloSPORINE (RESTASIS) 0.05 % ophthalmic emulsion, Administer 1 drop to both eyes Every  "12 (Twelve) Hours., Disp: , Rfl:     omeprazole (priLOSEC) 40 MG capsule, Take 1 capsule by mouth 2 (Two) Times a Day., Disp: , Rfl:     PARoxetine (PAXIL) 40 MG tablet, TAKE ONE TABLET BY MOUTH EVERY MORNING, Disp: 90 tablet, Rfl: 1    vitamin D3 125 MCG (5000 UT) capsule capsule, Take 2,000 Units by mouth Daily., Disp: , Rfl:     zolpidem (AMBIEN) 5 MG tablet, Take 1 tablet by mouth At Night As Needed for Sleep., Disp: 30 tablet, Rfl: 0    Hydrocortisone, Perianal, (Anusol-HC) 2.5 % rectal cream, Insert  into the rectum 2 (Two) Times a Day As Needed for Hemorrhoids. (Patient not taking: Reported on 11/29/2023), Disp: 28 g, Rfl: 3    terbinafine (lamiSIL) 250 MG tablet, Take 1 tablet by mouth Daily. (Patient not taking: Reported on 11/29/2023), Disp: , Rfl:   No current facility-administered medications for this visit.    Facility-Administered Medications Ordered in Other Visits:     Chlorhexidine Gluconate Cloth 2 % pads, , Apply externally, Take As Directed, Peter Muir MD    No Known Allergies    Social History     Tobacco Use    Smoking status: Never    Smokeless tobacco: Never    Tobacco comments:     Caffeine - Yes   Vaping Use    Vaping Use: Never used   Substance Use Topics    Alcohol use: Yes     Comment: rare    Drug use: No       Family History   Problem Relation Age of Onset    Heart disease Father     Depression Father     Mental illness Father     Cancer Father     Heart disease Mother     Cancer Mother     Hypertension Mother     Depression Mother     Colon polyps Mother     Mental illness Mother     No Known Problems Son     No Known Problems Daughter     Colon cancer Maternal Grandfather     Breast cancer Maternal Aunt     Malig Hyperthermia Neg Hx     Ovarian cancer Neg Hx     Uterine cancer Neg Hx        Review of Systems   All other systems reviewed and are negative.      BP 98/60   Ht 170.2 cm (67\")   Wt 74.5 kg (164 lb 3.2 oz)   LMP 11/04/2016   BMI 25.72 kg/m²     Physical " Exam  Vitals reviewed. Exam conducted with a chaperone present.   Constitutional:       General: She is not in acute distress.  HENT:      Head: Normocephalic and atraumatic.      Right Ear: External ear normal.      Left Ear: External ear normal.   Eyes:      Extraocular Movements: Extraocular movements intact.      Pupils: Pupils are equal, round, and reactive to light.   Cardiovascular:      Rate and Rhythm: Normal rate.   Pulmonary:      Effort: Pulmonary effort is normal. No respiratory distress.   Chest:   Breasts:     Right: No swelling, bleeding, inverted nipple, mass, nipple discharge, skin change or tenderness.      Left: No swelling, bleeding, inverted nipple, mass, nipple discharge, skin change or tenderness.      Comments: Bilateral breast implants present   Abdominal:      General: There is no distension.      Palpations: Abdomen is soft.      Tenderness: There is no abdominal tenderness. There is no guarding or rebound.   Genitourinary:     General: Normal vulva.      Exam position: Lithotomy position.      Labia:         Right: No rash, tenderness, lesion or injury.         Left: No rash, tenderness, lesion or injury.       Urethra: No prolapse or urethral swelling.      Vagina: No vaginal discharge, erythema, tenderness, bleeding or lesions.      Cervix: Normal.      Uterus: Not enlarged, not fixed and not tender.       Adnexa:         Right: No mass, tenderness or fullness.          Left: No mass, tenderness or fullness.     Musculoskeletal:         General: No deformity. Normal range of motion.      Cervical back: Normal range of motion and neck supple.   Lymphadenopathy:      Upper Body:      Right upper body: No supraclavicular or axillary adenopathy.      Left upper body: No supraclavicular or axillary adenopathy.      Lower Body: No right inguinal adenopathy. No left inguinal adenopathy.   Skin:     General: Skin is warm and dry.   Neurological:      General: No focal deficit present.       Mental Status: She is alert and oriented to person, place, and time.   Psychiatric:         Mood and Affect: Mood normal.         Behavior: Behavior normal.       Assessment     1) GYN annual well woman exam.   2) Postmenopausal status  3) Breast cancer screening      Plan     1) Breast Health - Clinical breast exam & mammogram yearly, Self breast awareness monthly. Mammogram performed today for breast cancer screening   2) Pap - Up to date. Due in 2027 per ASCCP guidelines.   3) Smoking status- non-smoker.   4) Colon health - screening colonoscopy recommended if not up to date  5) Bone health - Weight bearing exercise, dietary calcium recommendations and vitamin D reviewed.   6) Activity recommends - Adult 150-300 min/week of multi-component physical activities that include balance training, aerobic and physical strengthening.    7) Follow up prn and one year      Landy Horne MD  11/29/2023  13:59 EST

## 2023-12-05 RX ORDER — OMEPRAZOLE 40 MG/1
40 CAPSULE, DELAYED RELEASE ORAL
Qty: 60 CAPSULE | Refills: 3 | Status: SHIPPED | OUTPATIENT
Start: 2023-12-05

## 2023-12-11 ENCOUNTER — OFFICE VISIT (OUTPATIENT)
Dept: ORTHOPEDIC SURGERY | Facility: CLINIC | Age: 58
End: 2023-12-11
Payer: MEDICAID

## 2023-12-11 VITALS — WEIGHT: 167.2 LBS | TEMPERATURE: 96.6 F | HEIGHT: 67 IN | BODY MASS INDEX: 26.24 KG/M2

## 2023-12-11 DIAGNOSIS — M25.572 SINUS TARSITIS OF LEFT FOOT: Primary | ICD-10-CM

## 2023-12-11 DIAGNOSIS — M76.829 POSTERIOR TIBIAL TENDON DYSFUNCTION: ICD-10-CM

## 2023-12-11 DIAGNOSIS — Q74.2 PAIN ASSOCIATED WITH ACCESSORY NAVICULAR BONE OF FOOT, LEFT: ICD-10-CM

## 2023-12-11 DIAGNOSIS — M21.40 PES PLANUS, UNSPECIFIED LATERALITY: ICD-10-CM

## 2023-12-11 DIAGNOSIS — M79.672 PAIN ASSOCIATED WITH ACCESSORY NAVICULAR BONE OF FOOT, LEFT: ICD-10-CM

## 2023-12-11 DIAGNOSIS — M25.872 IMPINGEMENT SYNDROME OF LEFT ANKLE: ICD-10-CM

## 2023-12-11 RX ORDER — LIDOCAINE HYDROCHLORIDE 10 MG/ML
3 INJECTION, SOLUTION EPIDURAL; INFILTRATION; INTRACAUDAL; PERINEURAL
Status: COMPLETED | OUTPATIENT
Start: 2023-12-11 | End: 2023-12-11

## 2023-12-11 RX ORDER — METHYLPREDNISOLONE ACETATE 80 MG/ML
80 INJECTION, SUSPENSION INTRA-ARTICULAR; INTRALESIONAL; INTRAMUSCULAR; SOFT TISSUE
Status: COMPLETED | OUTPATIENT
Start: 2023-12-11 | End: 2023-12-11

## 2023-12-11 RX ADMIN — LIDOCAINE HYDROCHLORIDE 3 ML: 10 INJECTION, SOLUTION EPIDURAL; INFILTRATION; INTRACAUDAL; PERINEURAL at 09:32

## 2023-12-11 RX ADMIN — METHYLPREDNISOLONE ACETATE 80 MG: 80 INJECTION, SUSPENSION INTRA-ARTICULAR; INTRALESIONAL; INTRAMUSCULAR; SOFT TISSUE at 09:32

## 2023-12-11 NOTE — PROGRESS NOTES
Ankle Follow Up      Patient: Sakina Guerrier    YOB: 1965 58 y.o. female    Chief Complaints: Ankle injected helped but getting sore again    History of Present Illness:Patient was seen on 10/31/2022 reporting a 6-month history of some increased pain and swelling in the left hindfoot more at the sinus tarsi than medial hindfoot.  She reported that she had always been somewhat flat-footed and felt that this may have progressed to some degree but not dramatically.  She had been doing some physical therapy as ordered by Dr. Luis for this and also therapy for her hip which she had replaced by   in May.      She also reported history of previous left fifth metatarsal fracture treated operatively and had subsequent hardware removal.  She also had bilateral third interdigital neuroma excisions all the surgeries were performed by podiatry.         She was felt to have some exacerbation of pes planus with possible posterior tib tendon dysfunction with sinus tarsi impingement and symptomatic accessory navicular.     She is instructed on heel cord stretching exercises and fitted with a PT TD brace and sent for MRI.     Patient was seen on 12/15/2022 reporting that she still has some pain in the ankle mostly in the sinus tarsi more so than any medially.     We reviewed her MRI and she was felt to have lateral impingement in the sinus tarsi with a sensory navicular with some posterior tib tendon dysfunction.  She did not desire any surgical treatment nor was any recommended at that time.  She was leaving in a few days to go to [place] for 3 months.  We decided to hold off on therapy or custom orthotics with her leaving soon and discussed other measures.  She is instructed on obtaining power step orthotics and fitted with a medial heel wedge and sinus tarsi was injected.  She was to been seen back 3 months thereafter     Patient was not seen back again until 8/14/2023.  She had returned from [place] in  April.  She had been busy over the summer.  She had been using the power step orthotics and heel wedge some but not really doing any heel cord stretching exercises.  She reported that the injection did help but was not long-lasting.    Although the injection had given her some relief she had recurrent exacerbation of symptoms and she denies any surgical treatment.  She instructed increase heel cord stretching exercises and continue with power step orthotics and medial heel wedge.  We discussed custom orthotics which she wanted to hold off on.  She was prescribed compounding cream and referred to see Augusto at [facility] for therapy.  She requested additional injection which I felt was reasonable in the left sinus tarsi was injected with steroid lidocaine mixture.    Patient is seen back today stating that the injection helped quite a bit after 2 weeks.  She has had decreased swelling and bruising and improvement in pain.  However with increased activity about 2 weeks ago she had some recurrence of symptoms of pain in the sinus tarsi but not as bad as it was before the previous injection.  She has been doing her heel cord stretching exercises and using compounding cream which she said helps.  She did do therapy through [facility] but not with Augusto at [facility].  She is continuing to do home exercises.  She has continued using power step orthotics with medial heel wedge.  HPI    ROS: ankle pain  Past Medical History:   Diagnosis Date    Acne     Acquired hypothyroidism     Anxiety     Cancer 09/2007    VULVAR CARCINOMA IN SITU    Cholelithiasis 9/2016    Gallbladder removed    Colon polyp 2017    Found last colonoscopy    Depression     Endometriosis     Fear of flying     GERD (gastroesophageal reflux disease) 2021    Hammertoe of right foot 04/24/2015    4TH RIGHT TOE    Hemorrhoids     Hip arthrosis 9 months    Influenza A 02/06/2018    Lateral epicondylitis of right elbow 07/26/2013    Left knee pain 10/19/2017  "   SAW DR. GHASSAN COLE    Left ovarian cyst 08/2006    FOLLICLE CYST    Lesion of left plantar nerve     Menopause     Neuroma of foot 5 years    Surgery to correct    Onychomycosis 03/18/2014    Primary insomnia     Rheumatic fever     AS A CHILD    Right hip pain     Syncope 02/07/2018    SEEN AT [facility]    Thrombosed hemorrhoids 09/26/2017    SAW DR. FAUZIA GIRON    Ventricular tachycardia        Physical Exam:   Vitals:    12/11/23 0920   Temp: 96.6 °F (35.9 °C)   Weight: 75.8 kg (167 lb 3.2 oz)   Height: 170.2 cm (67\")   PainSc:   3     Well developed with normal mood.  She has mild tenderness to palpation the left sinus tarsi but no exacerbation of pain with subtalar motion she was nontender over the medial aspect of the hindfoot along the navicular and had good inversion strength.      Radiology:MRI films and report of the left hindfoot dated 11/29/2022 reviewed which showed narrowing of the tarsal sinus posteriorly with loss of fat signal in that area and adjacent mild marrow edema of the calcaneus     Plantar lateral talar cortex abuts the dorsal cortex of the anterior calcaneal process.  Posterior tib tendon maintains normal signal and thickness with no tendon sheath effusion.  There is a 6 mm accessory ossicle within the substance of the distal posterior tib tendon.  Other ankle ligaments and tendons appeared intact at that articular cartilage.      Assessment/Plan:  Left pes planus with sinus tarsi impingement  2.  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    We discussed treatment going forward and she does not desire any surgical treatment nor do recommended at this time.  She has had some exacerbation of previous symptoms but not to the degree that she had prior to her previous injection    We discussed custom orthotics which she still wants to hold off on    She will continue with her home therapy exercises as well as power step orthotic with medial heel wedge.  She " will continue with compounding cream as previously prescribed.    We discussed other treatment options and she requested a repeat injection which I felt was reasonable and that the left sinus tarsi was injected with steroid lidocaine mixture    We had a pleasant visit and she is leaving in February to go to [place] for several months.  At this point plan on seeing her back as needed    Medium Joint Arthrocentesis: L ankle  Date/Time: 12/11/2023 9:32 AM  Consent given by: patient  Site marked: site marked  Timeout: Immediately prior to procedure a time out was called to verify the correct patient, procedure, equipment, support staff and site/side marked as required   Supporting Documentation  Indications: pain   Procedure Details  Location: ankle - L ankle  Preparation: Patient was prepped and draped in the usual sterile fashion  Needle size: 22 G  Approach: anterolateral  Medications administered: 80 mg methylPREDNISolone acetate 80 MG/ML; 3 mL lidocaine PF 1% 1 %  Patient tolerance: patient tolerated the procedure well with no immediate complications

## 2024-01-11 RX ORDER — OMEPRAZOLE 40 MG/1
40 CAPSULE, DELAYED RELEASE ORAL
Qty: 60 CAPSULE | Refills: 3 | Status: CANCELLED | OUTPATIENT
Start: 2024-01-11

## 2024-01-15 ENCOUNTER — TELEPHONE (OUTPATIENT)
Dept: GASTROENTEROLOGY | Facility: CLINIC | Age: 59
End: 2024-01-15

## 2024-01-15 RX ORDER — OMEPRAZOLE 40 MG/1
40 CAPSULE, DELAYED RELEASE ORAL DAILY
Qty: 90 CAPSULE | Refills: 1 | Status: SHIPPED | OUTPATIENT
Start: 2024-01-15

## 2024-03-28 DIAGNOSIS — F51.04 PSYCHOPHYSIOLOGICAL INSOMNIA: Chronic | ICD-10-CM

## 2024-03-28 RX ORDER — ZOLPIDEM TARTRATE 5 MG/1
5 TABLET ORAL NIGHTLY PRN
Qty: 30 TABLET | Refills: 0 | Status: SHIPPED | OUTPATIENT
Start: 2024-03-28

## 2024-04-22 ENCOUNTER — OFFICE VISIT (OUTPATIENT)
Dept: ORTHOPEDIC SURGERY | Facility: CLINIC | Age: 59
End: 2024-04-22
Payer: MEDICAID

## 2024-04-22 VITALS — BODY MASS INDEX: 26.43 KG/M2 | TEMPERATURE: 97.1 F | HEIGHT: 67 IN | WEIGHT: 168.4 LBS

## 2024-04-22 DIAGNOSIS — Q74.2 PAIN ASSOCIATED WITH ACCESSORY NAVICULAR BONE OF FOOT, LEFT: ICD-10-CM

## 2024-04-22 DIAGNOSIS — M25.872 IMPINGEMENT SYNDROME OF LEFT ANKLE: ICD-10-CM

## 2024-04-22 DIAGNOSIS — M25.572 SINUS TARSITIS OF LEFT FOOT: Primary | ICD-10-CM

## 2024-04-22 DIAGNOSIS — M79.672 PAIN ASSOCIATED WITH ACCESSORY NAVICULAR BONE OF FOOT, LEFT: ICD-10-CM

## 2024-04-22 DIAGNOSIS — M76.829 POSTERIOR TIBIAL TENDON DYSFUNCTION: ICD-10-CM

## 2024-04-22 DIAGNOSIS — M21.40 PES PLANUS, UNSPECIFIED LATERALITY: ICD-10-CM

## 2024-04-22 PROCEDURE — 99214 OFFICE O/P EST MOD 30 MIN: CPT | Performed by: ORTHOPAEDIC SURGERY

## 2024-04-22 NOTE — PROGRESS NOTES
Ankle Follow Up      Patient: Sakina Guerrier    YOB: 1965 59 y.o. female    Chief Complaints: Ankle is sore    History of Present Illness::Patient was seen on 10/31/2022 reporting a 6-month history of some increased pain and swelling in the left hindfoot more at the sinus tarsi than medial hindfoot.  She reported that she had always been somewhat flat-footed and felt that this may have progressed to some degree but not dramatically.  She had been doing some physical therapy as ordered by Dr. Luis for this and also therapy for her hip which she had replaced by   in May.      She also reported history of previous left fifth metatarsal fracture treated operatively and had subsequent hardware removal.  She also had bilateral third interdigital neuroma excisions all the surgeries were performed by podiatry.         She was felt to have some exacerbation of pes planus with possible posterior tib tendon dysfunction with sinus tarsi impingement and symptomatic accessory navicular.     She is instructed on heel cord stretching exercises and fitted with a PT TD brace and sent for MRI.     Patient was seen on 12/15/2022 reporting that she still has some pain in the ankle mostly in the sinus tarsi more so than any medially.     We reviewed her MRI and she was felt to have lateral impingement in the sinus tarsi with a sensory navicular with some posterior tib tendon dysfunction.  She did not desire any surgical treatment nor was any recommended at that time.  She was leaving in a few days to go to [place] for 3 months.  We decided to hold off on therapy or custom orthotics with her leaving soon and discussed other measures.  She is instructed on obtaining power step orthotics and fitted with a medial heel wedge and sinus tarsi was injected.  She was to been seen back 3 months thereafter     Patient was not seen back again until 8/14/2023.  She had returned from [place] in April.  She had been busy  over the summer.  She had been using the power step orthotics and heel wedge some but not really doing any heel cord stretching exercises.  She reported that the injection did help but was not long-lasting.     Although the injection had given her some relief she had recurrent exacerbation of symptoms and she denies any surgical treatment.  She instructed increase heel cord stretching exercises and continue with power step orthotics and medial heel wedge.  We discussed custom orthotics which she wanted to hold off on.  She was prescribed compounding cream and referred to see Augusto at [facility] for therapy.  She requested additional injection which I felt was reasonable in the left sinus tarsi was injected with steroid lidocaine mixture.     Patient was seen on 12/11/2023 stating that the size tarsi injection from 8/14/2023  helped quite a bit after 2 weeks.  She had had decreased swelling and bruising and improvement in pain.  However with increased activity about 2 weeks her she had some recurrence of symptoms of pain in the sinus tarsi but not as bad as it was before the previous injection.  She had been doing her heel cord stretching exercises and using compounding cream which she said helped.  She did do therapy through [facility] but not with Augusto at [facility].  She was continuing to do home exercises.  She had continued using power step orthotics with medial heel wedge.    We discussed treatment and she did not desire any surgical treatment at that time.  She had some exacerbation of previous symptoms but not to the degree that it had prior to her previous injection.  We discussed custom orthotics which she wanted to hold off on.  She is instructed continue with home therapy with power step orthotic and medial heel wedge and compounding cream and left sinus tarsi was injected.  At that time she was leaving to go to [place] for several months.    Patient is seen back today stating her ankle is still  "bothering her.  She did have some relief from the injection but had recurrent pain in the inferolateral aspect of her left hindfoot and the sinus tarsi really more so than any medially.  She is continue with stretching and compounding cream as well as power step orthotic with medial heel wedge.  HPI    ROS: ankle pain  Past Medical History:   Diagnosis Date    Acne     Acquired hypothyroidism     Anxiety     Cancer 09/2007    VULVAR CARCINOMA IN SITU    Cholelithiasis 9/2016    Gallbladder removed    Colon polyp 2017    Found last colonoscopy    Depression     Endometriosis     Fear of flying     GERD (gastroesophageal reflux disease) 2021    Hammertoe of right foot 04/24/2015    4TH RIGHT TOE    Hemorrhoids     Hip arthrosis 9 months    Influenza A 02/06/2018    Lateral epicondylitis of right elbow 07/26/2013    Left knee pain 10/19/2017    SAW DR. GHASSAN COLE    Left ovarian cyst 08/2006    FOLLICLE CYST    Lesion of left plantar nerve     Menopause     Neuroma of foot 5 years    Surgery to correct    Onychomycosis 03/18/2014    Primary insomnia     Rheumatic fever     AS A CHILD    Right hip pain     Syncope 02/07/2018    SEEN AT [facility]    Thrombosed hemorrhoids 09/26/2017    SAW DR. FAUZIA GIRON    Ventricular tachycardia        Physical Exam:   Vitals:    04/22/24 0910   Temp: 97.1 °F (36.2 °C)   Weight: 76.4 kg (168 lb 6.4 oz)   Height: 170.2 cm (67\")   PainSc:   2     Well developed with normal mood.  On exam she has planovalgus alignment left hindfoot with tenderness in the sinus tarsi.  Hindfoot is passively correctable beyond neutral with a little bit of residual forefoot varus.  She had decent inversion strength without much of any pain along the medial hindfoot.      Radiology:MRI films and report of the left hindfoot dated 11/29/2022 reviewed which showed narrowing of the tarsal sinus posteriorly with loss of fat signal in that area and adjacent mild marrow edema of the calcaneus     Plantar " lateral talar cortex abuts the dorsal cortex of the anterior calcaneal process.  Posterior tib tendon maintains normal signal and thickness with no tendon sheath effusion.  There is a 6 mm accessory ossicle within the substance of the distal posterior tib tendon.  Other ankle ligaments and tendons appeared intact at that articular cartilage.        Assessment/Plan:  Left pes planus with sinus tarsi impingement  2.  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    We discussed treatment going forward and she is interested in pursuing surgical treatment.  Reviewed with her that nothing I would know to do would be a calcaneal displacement osteotomy and debridement of her posterior tib tendon with the accessory navicular with advancement of the posterior tib tendon and possible FDL tendon transfer and medial cuneiform osteotomy.    She does not want anything till sometime in July and this is quite a bit to go through.    We discussed other opinions and I felt it was a good idea to get an opinion from Dr. Patton on how he would proceed and recommendations to see if there is anything different that he would recommend the patient was in agreement with that.    She was given his name and number and referral was placed in [facility].  She was also provided with copies of her x-rays and instructed that she should take a copy of her MRI disc and report as well.    Will see her back in 6 to 8 weeks after she has seen him that she does not wish to do anything to sometime in July.

## 2024-05-07 ENCOUNTER — DOCUMENTATION (OUTPATIENT)
Dept: PHYSICAL THERAPY | Facility: HOSPITAL | Age: 59
End: 2024-05-07
Payer: MEDICAID

## 2024-06-18 ENCOUNTER — TELEPHONE (OUTPATIENT)
Dept: ORTHOPEDIC SURGERY | Facility: CLINIC | Age: 59
End: 2024-06-18

## 2024-06-18 RX ORDER — OMEPRAZOLE 40 MG/1
40 CAPSULE, DELAYED RELEASE ORAL DAILY
Qty: 90 CAPSULE | Refills: 1 | Status: SHIPPED | OUTPATIENT
Start: 2024-06-18

## 2024-06-18 NOTE — TELEPHONE ENCOUNTER
----- Message from [employer] sent at 6/18/2024 11:23 AM EDT -----  Regarding: APPT  Contact: [phone]  I will be in [place] month of September.  Would like to schedule if I can get in.

## 2024-06-18 NOTE — TELEPHONE ENCOUNTER
Caller: Sakina Guerrier    Relationship to patient: Self    Best call back number: [phone]    Chief complaint: LEFT ANKLE     Type of visit: SURGERY     Requested date: SEPTEMBER      If rescheduling, when is the original appointment: N/A      Additional notes:PATIENT IS IN [place] AND WILL BE BACK IN [place] FOR THE MONTH OF SEPTEMBER.

## 2024-06-18 NOTE — TELEPHONE ENCOUNTER
----- Message from [employer] sent at 6/18/2024 11:23 AM EDT -----  Regarding: APPT  Contact: [phone]  I will be in [place] month of September.  Would like to schedule if I can get in.

## 2024-06-18 NOTE — TELEPHONE ENCOUNTER
Spoke to patient. She is currently in [place], but will be back in town for a brief time in July. Patient saw Dr. Pride on 4/22/24 for her Lt ankle. She is scheduled to see Dr. Patton in a few weeks, per Dr. Pride's referral. Patient stated she believes she will want to proceed with surgery w/ Dr. Pride regardless, though, and wanted to get surgery scheduled for September. I scheduled an appointment for her to discuss this with Dr. Pride on 7/11/24, which is the same day but after her appointment with Dr. Patton.

## 2024-06-23 DIAGNOSIS — F41.9 ANXIETY: Chronic | ICD-10-CM

## 2024-06-24 RX ORDER — PAROXETINE HYDROCHLORIDE 40 MG/1
TABLET, FILM COATED ORAL
Qty: 90 TABLET | Refills: 1 | OUTPATIENT
Start: 2024-06-24

## 2024-07-06 ENCOUNTER — TELEPHONE (OUTPATIENT)
Dept: GASTROENTEROLOGY | Facility: CLINIC | Age: 59
End: 2024-07-06
Payer: MEDICAID

## 2024-07-06 DIAGNOSIS — F41.9 ANXIETY: Chronic | ICD-10-CM

## 2024-07-06 DIAGNOSIS — F51.04 PSYCHOPHYSIOLOGICAL INSOMNIA: Chronic | ICD-10-CM

## 2024-07-08 RX ORDER — OMEPRAZOLE 40 MG/1
40 CAPSULE, DELAYED RELEASE ORAL DAILY
Qty: 90 CAPSULE | Refills: 0 | Status: SHIPPED | OUTPATIENT
Start: 2024-07-08

## 2024-07-08 NOTE — TELEPHONE ENCOUNTER
Called patient and scheduled annual follow up with Cathleen in September. She states she is in [place] right now and won't be back until the month of September and that is the only time she will be here. She asked if the prescription could get refilled into [facility] and her daughter can bring it to her when she comes to visit. Please advise.

## 2024-07-08 NOTE — TELEPHONE ENCOUNTER
Called patient because she is overdue for an appointment. However, patient is currently out of state until September and will callback to reschedule her appointment.     Patient stated she still wanted this prescriptions sent to the [facility] and will have her daughter pick it up

## 2024-07-08 NOTE — TELEPHONE ENCOUNTER
Called pt and advised that Dr Platt has refilled her omeprazole to her [facility] pharmacy .  Verb understanding.

## 2024-07-10 RX ORDER — ZOLPIDEM TARTRATE 5 MG/1
5 TABLET ORAL NIGHTLY PRN
Qty: 30 TABLET | Refills: 0 | Status: SHIPPED | OUTPATIENT
Start: 2024-07-10

## 2024-07-10 RX ORDER — PAROXETINE HYDROCHLORIDE 40 MG/1
40 TABLET, FILM COATED ORAL EVERY MORNING
Qty: 90 TABLET | Refills: 0 | Status: SHIPPED | OUTPATIENT
Start: 2024-07-10

## 2024-07-10 NOTE — TELEPHONE ENCOUNTER
PDMP reviewed  Medication sent to pharmacy  She should not be mailing controlled substances through the mail- just FYI  She needs to reschedule her appoointment  No further fills until seen in office

## 2024-07-11 ENCOUNTER — OFFICE VISIT (OUTPATIENT)
Dept: ORTHOPEDIC SURGERY | Facility: CLINIC | Age: 59
End: 2024-07-11
Payer: MEDICAID

## 2024-07-11 VITALS — WEIGHT: 167.8 LBS | HEIGHT: 67 IN | BODY MASS INDEX: 26.34 KG/M2 | TEMPERATURE: 96.9 F

## 2024-07-11 DIAGNOSIS — F41.9 ANXIETY: Chronic | ICD-10-CM

## 2024-07-11 DIAGNOSIS — Q74.2 PAIN ASSOCIATED WITH ACCESSORY NAVICULAR BONE OF FOOT, LEFT: ICD-10-CM

## 2024-07-11 DIAGNOSIS — M79.672 PAIN ASSOCIATED WITH ACCESSORY NAVICULAR BONE OF FOOT, LEFT: ICD-10-CM

## 2024-07-11 DIAGNOSIS — M25.872 IMPINGEMENT SYNDROME OF LEFT ANKLE: ICD-10-CM

## 2024-07-11 DIAGNOSIS — M25.572 SINUS TARSITIS OF LEFT FOOT: ICD-10-CM

## 2024-07-11 DIAGNOSIS — M76.829 POSTERIOR TIBIAL TENDON DYSFUNCTION: Primary | ICD-10-CM

## 2024-07-11 PROCEDURE — 99214 OFFICE O/P EST MOD 30 MIN: CPT | Performed by: ORTHOPAEDIC SURGERY

## 2024-07-12 RX ORDER — PAROXETINE HYDROCHLORIDE 40 MG/1
40 TABLET, FILM COATED ORAL EVERY MORNING
Qty: 90 TABLET | Refills: 0 | OUTPATIENT
Start: 2024-07-12

## 2024-07-17 NOTE — PROGRESS NOTES
Ankle Follow Up      Patient: Sakina Guerrier    YOB: 1965 59 y.o. female    Chief Complaints: Ankle pain    History of Present Illness:Patient was seen on 10/31/2022 reporting a 6-month history of some increased pain and swelling in the left hindfoot more at the sinus tarsi than medial hindfoot.  She reported that she had always been somewhat flat-footed and felt that this may have progressed to some degree but not dramatically.  She had been doing some physical therapy as ordered by Dr. Luis for this and also therapy for her hip which she had replaced by   in May.      She also reported history of previous left fifth metatarsal fracture treated operatively and had subsequent hardware removal.  She also had bilateral third interdigital neuroma excisions all the surgeries were performed by podiatry.         She was felt to have some exacerbation of pes planus with possible posterior tib tendon dysfunction with sinus tarsi impingement and symptomatic accessory navicular.     She is instructed on heel cord stretching exercises and fitted with a PT TD brace and sent for MRI.     Patient was seen on 12/15/2022 reporting that she still has some pain in the ankle mostly in the sinus tarsi more so than any medially.     We reviewed her MRI and she was felt to have lateral impingement in the sinus tarsi with a sensory navicular with some posterior tib tendon dysfunction.  She did not desire any surgical treatment nor was any recommended at that time.  She was leaving in a few days to go to [place] for 3 months.  We decided to hold off on therapy or custom orthotics with her leaving soon and discussed other measures.  She is instructed on obtaining power step orthotics and fitted with a medial heel wedge and sinus tarsi was injected.  She was to been seen back 3 months thereafter     Patient was not seen back again until 8/14/2023.  She had returned from [place] in April.  She had been busy over  the summer.  She had been using the power step orthotics and heel wedge some but not really doing any heel cord stretching exercises.  She reported that the injection did help but was not long-lasting.     Although the injection had given her some relief she had recurrent exacerbation of symptoms and she denies any surgical treatment.  She instructed increase heel cord stretching exercises and continue with power step orthotics and medial heel wedge.  We discussed custom orthotics which she wanted to hold off on.  She was prescribed compounding cream and referred to see Augusto at [facility] for therapy.  She requested additional injection which I felt was reasonable in the left sinus tarsi was injected with steroid lidocaine mixture.     Patient was seen on 12/11/2023 stating that the size tarsi injection from 8/14/2023  helped quite a bit after 2 weeks.  She had had decreased swelling and bruising and improvement in pain.  However with increased activity about 2 weeks her she had some recurrence of symptoms of pain in the sinus tarsi but not as bad as it was before the previous injection.  She had been doing her heel cord stretching exercises and using compounding cream which she said helped.  She did do therapy through [facility] but not with Augusto at [facility].  She was continuing to do home exercises.  She had continued using power step orthotics with medial heel wedge.     We discussed treatment and she did not desire any surgical treatment at that time.  She had some exacerbation of previous symptoms but not to the degree that it had prior to her previous injection.  We discussed custom orthotics which she wanted to hold off on.  She is instructed continue with home therapy with power step orthotic and medial heel wedge and compounding cream and left sinus tarsi was injected.  At that time she was leaving to go to [place] for several months.     Patient seen on 4/22/2024 stating her ankle was still bothering  her.  She did have some relief from the sinus tarsi injection but had recurrent pain in the inferolateral aspect of her left hindfoot and the sinus tarsi really more so than any medially.  She she had continued with stretching and compounding cream as well as power step orthotic with medial heel wedge.    That time we discussed treatment and she was interested in pursuing surgical treatment.  Reviewed with her that would likely need to have a calcaneal displacement osteotomy and debridement of her posterior tib tendon accessory navicular with possible posterior tib tendon advancement and possible FDL tendon transfer and medial cuneiform osteotomy.  She did not want to do anything until sometime in July and was quite a bit to go through.  We discussed other opinions and felt it was a good idea to get an opinion from Dr. Patton prior to proceeding.    Patient saw Dr. Patton on the morning of 7/11/2024.  And per patient's review he was in agreement with the proposed treatment plan and would have done the same thing with calcaneal displacement osteotomy and cotton osteotomy as well as posterior tib tendon debridement removal of accessory navicular and likely FDL tendon transfer.  Patient is having persistent pain in the sinus tarsi due to asymmetric lateral impingement likely from posterior tib tendon dysfunction although did not show gross abnormality on previous MRI.  She has been unimproved with conservative measures  HPI    ROS: ankle pain  Past Medical History:   Diagnosis Date    Acne     Acquired hypothyroidism     Anxiety     Cancer 09/2007    VULVAR CARCINOMA IN SITU    Cholelithiasis 9/2016    Gallbladder removed    Colon polyp 2017    Found last colonoscopy    Depression     Endometriosis     Fear of flying     GERD (gastroesophageal reflux disease) 2021    Hammertoe of right foot 04/24/2015    4TH RIGHT TOE    Hemorrhoids     Hip arthrosis 9 months    Influenza A 02/06/2018    Lateral epicondylitis of right  "elbow 07/26/2013    Left knee pain 10/19/2017    SAW DR. GHASSAN COLE    Left ovarian cyst 08/2006    FOLLICLE CYST    Lesion of left plantar nerve     Menopause     Neuroma of foot 5 years    Surgery to correct    Onychomycosis 03/18/2014    Primary insomnia     Rheumatic fever     AS A CHILD    Right hip pain     Syncope 02/07/2018    SEEN AT [facility]    Thrombosed hemorrhoids 09/26/2017    SAW DR. FAUZIA GIRON    Ventricular tachycardia        Physical Exam:   Vitals:    07/11/24 1436   Temp: 96.9 °F (36.1 °C)   Weight: 76.1 kg (167 lb 12.8 oz)   Height: 170.2 cm (67\")   PainSc:   4     Well developed with normal mood.  Exam she has significant hindfoot valgus with arch collapse and midfoot abduction.  She is tender directly over the sinus tarsi.  She has decent inversion strength without appreciable pain on the posterior tib tendon inversion strength is at least 4+ to 5 out of 5.Clinical hindfoot motion remains supple.  She had 15 degrees dorsiflexion 6 degrees plantarflexion good anterior tib tendon strength.  She was grossly sensate light touch throughout the left foot.      Radiology:  MRI films and report of the left hindfoot dated 11/29/2022 reviewed which showed narrowing of the tarsal sinus posteriorly with loss of fat signal in that area and adjacent mild marrow edema of the calcaneus     Plantar lateral talar cortex abuts the dorsal cortex of the anterior calcaneal process.  Posterior tib tendon maintains normal signal and thickness with no tendon sheath effusion.  There is a 6 mm accessory ossicle within the substance of the distal posterior tib tendon.  Other ankle ligaments and tendons appeared intact at that articular cartilage.        Assessment/Plan:  Left pes planus with sinus tarsi impingement  2.  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    We discussed treatment going forward and although no guarantees could be given she would like to proceed with operative " treatment.  Will plan on medial displacement calcaneal osteotomy with plate fixation as well as likely cotton osteotomy of the medial cuneiform as well as debridement of the posterior tib tendon with removal of the accessory navicular and likely FDL tendon transfer to navicular.  Although previous MRI had not shown clear posterior tib tendon pathology it is obviously dysfunctional.    Patient voiced clear understanding the operative procedure and postoperative course.  She will be nonweightbearing for at least 6 weeks postoperatively and will have interval splint/cast changes in the interim.    Risk benefits potential outcomes and complications reviewed of which she voiced clear understanding and can include but are not limited to heart attack stroke death pneumonia infection bleeding damage to blood vessels nerves or tendons blood clots pulmonary embolism persistent or worsening pain stiffness instability need for subsequent surgery and failure to return to presurgery and precondition levels of activity as well as failure of osteotomy healing and tendon rupture as well as wound healing complications and even catastrophic complications resulting in loss of the leg.    All of her questions were answered to her full satisfaction and she would like to proceed with this sometime in September while she is in town and understands she will need to be in town for at least 6 weeks prior to returning to [place] and will need to come back for interval follow-up appointments.  She was encouraged to call if she has any questions prior to surgery.

## 2024-07-24 ENCOUNTER — TELEPHONE (OUTPATIENT)
Dept: ORTHOPEDIC SURGERY | Facility: CLINIC | Age: 59
End: 2024-07-24

## 2024-07-24 NOTE — TELEPHONE ENCOUNTER
Caller: Sakina Guerrier    Relationship to patient: Self    Best call back number: [phone] (home)     Patient is needing: PATIENT IS WANTING TO HEAR FROM KAMINI TO GET SX SCHEDULED WITH DR JOHN.   PLEASE ADVISE      Fair

## 2024-07-25 ENCOUNTER — TELEPHONE (OUTPATIENT)
Dept: ORTHOPEDIC SURGERY | Facility: CLINIC | Age: 59
End: 2024-07-25

## 2024-07-25 NOTE — TELEPHONE ENCOUNTER
Hub staff attempted to follow warm transfer process and was unsuccessful     Caller: DAREK HURST     Relationship to patient: PATIENT     Best call back number: [phone]  Patient is needing:  PATIENT REQUEST TO SPEAK WITH PRACTICE MANAGER LUL

## 2024-08-01 ENCOUNTER — TELEPHONE (OUTPATIENT)
Dept: GASTROENTEROLOGY | Facility: CLINIC | Age: 59
End: 2024-08-01
Payer: MEDICAID

## 2024-08-01 NOTE — TELEPHONE ENCOUNTER
----- Message from [facility] [email or web address] sent at 8/1/2024 10:02 AM EDT -----  Regarding: Appointment Cancellation Request  Contact: [phone]  Sakina Guerrier would like to cancel the following appointments:    Cathleen Bell in [facility] ([phone]), 9/10/2024  1:00 PM    Comments:  I need to schedule appt the following week. Please call me.

## 2024-08-26 ENCOUNTER — TELEPHONE (OUTPATIENT)
Dept: ORTHOPEDIC SURGERY | Facility: CLINIC | Age: 59
End: 2024-08-26
Payer: MEDICAID

## 2024-08-26 NOTE — TELEPHONE ENCOUNTER
I returned patient's call and after thorough discussion with her we decided to postpone her surgery until spring 2025 as she is going to require extensive therapy postoperatively and her insurance is not accepted for therapy in [place].  She is leaving to return to [place] in several weeks.  She is already scheduled to see me on 8/29/2024 and we will see her then and discuss treatment going forward and she inquired about possibility of repeat injections to help get through the winter which I feel would be reasonable.    She appreciated the call and we will postpone her surgery for now but see her as scheduled later this week.

## 2024-08-29 ENCOUNTER — TELEPHONE (OUTPATIENT)
Dept: CARDIOLOGY | Facility: CLINIC | Age: 59
End: 2024-08-29

## 2024-08-29 ENCOUNTER — OFFICE VISIT (OUTPATIENT)
Dept: CARDIOLOGY | Facility: CLINIC | Age: 59
End: 2024-08-29
Payer: MEDICAID

## 2024-08-29 ENCOUNTER — OFFICE VISIT (OUTPATIENT)
Dept: ORTHOPEDIC SURGERY | Facility: CLINIC | Age: 59
End: 2024-08-29
Payer: MEDICAID

## 2024-08-29 ENCOUNTER — LAB (OUTPATIENT)
Dept: LAB | Facility: HOSPITAL | Age: 59
End: 2024-08-29
Payer: MEDICAID

## 2024-08-29 VITALS — TEMPERATURE: 97.1 F | BODY MASS INDEX: 26.12 KG/M2 | WEIGHT: 166.4 LBS | HEIGHT: 67 IN

## 2024-08-29 VITALS
WEIGHT: 165 LBS | DIASTOLIC BLOOD PRESSURE: 70 MMHG | HEART RATE: 74 BPM | SYSTOLIC BLOOD PRESSURE: 100 MMHG | OXYGEN SATURATION: 97 % | BODY MASS INDEX: 25.9 KG/M2 | HEIGHT: 67 IN

## 2024-08-29 DIAGNOSIS — M25.572 SINUS TARSITIS OF LEFT FOOT: Primary | ICD-10-CM

## 2024-08-29 DIAGNOSIS — Q74.2 PAIN ASSOCIATED WITH ACCESSORY NAVICULAR BONE OF FOOT, LEFT: ICD-10-CM

## 2024-08-29 DIAGNOSIS — R42 DIZZINESS: ICD-10-CM

## 2024-08-29 DIAGNOSIS — R42 DIZZINESS: Primary | ICD-10-CM

## 2024-08-29 DIAGNOSIS — M25.872 IMPINGEMENT SYNDROME OF LEFT ANKLE: ICD-10-CM

## 2024-08-29 DIAGNOSIS — M76.829 POSTERIOR TIBIAL TENDON DYSFUNCTION: ICD-10-CM

## 2024-08-29 DIAGNOSIS — R52 PAIN: ICD-10-CM

## 2024-08-29 DIAGNOSIS — M79.672 PAIN ASSOCIATED WITH ACCESSORY NAVICULAR BONE OF FOOT, LEFT: ICD-10-CM

## 2024-08-29 LAB
ANION GAP SERPL CALCULATED.3IONS-SCNC: 10.3 MMOL/L (ref 5–15)
BUN SERPL-MCNC: 16 MG/DL (ref 6–20)
BUN/CREAT SERPL: 18.8 (ref 7–25)
CALCIUM SPEC-SCNC: 9.3 MG/DL (ref 8.6–10.5)
CHLORIDE SERPL-SCNC: 103 MMOL/L (ref 98–107)
CO2 SERPL-SCNC: 23.7 MMOL/L (ref 22–29)
CREAT SERPL-MCNC: 0.85 MG/DL (ref 0.57–1)
DEPRECATED RDW RBC AUTO: 39.1 FL (ref 37–54)
EGFRCR SERPLBLD CKD-EPI 2021: 79.5 ML/MIN/1.73
ERYTHROCYTE [DISTWIDTH] IN BLOOD BY AUTOMATED COUNT: 12.2 % (ref 12.3–15.4)
GLUCOSE SERPL-MCNC: 108 MG/DL (ref 65–99)
HCT VFR BLD AUTO: 39.8 % (ref 34–46.6)
HGB BLD-MCNC: 12.9 G/DL (ref 12–15.9)
MCH RBC QN AUTO: 28.7 PG (ref 26.6–33)
MCHC RBC AUTO-ENTMCNC: 32.4 G/DL (ref 31.5–35.7)
MCV RBC AUTO: 88.4 FL (ref 79–97)
PLATELET # BLD AUTO: 251 10*3/MM3 (ref 140–450)
PMV BLD AUTO: 10.9 FL (ref 6–12)
POTASSIUM SERPL-SCNC: 4.7 MMOL/L (ref 3.5–5.2)
RBC # BLD AUTO: 4.5 10*6/MM3 (ref 3.77–5.28)
SODIUM SERPL-SCNC: 137 MMOL/L (ref 136–145)
WBC NRBC COR # BLD AUTO: 11.61 10*3/MM3 (ref 3.4–10.8)

## 2024-08-29 PROCEDURE — 99214 OFFICE O/P EST MOD 30 MIN: CPT | Performed by: NURSE PRACTITIONER

## 2024-08-29 PROCEDURE — 1160F RVW MEDS BY RX/DR IN RCRD: CPT | Performed by: NURSE PRACTITIONER

## 2024-08-29 PROCEDURE — 36415 COLL VENOUS BLD VENIPUNCTURE: CPT

## 2024-08-29 PROCEDURE — 80048 BASIC METABOLIC PNL TOTAL CA: CPT

## 2024-08-29 PROCEDURE — 1159F MED LIST DOCD IN RCRD: CPT | Performed by: NURSE PRACTITIONER

## 2024-08-29 PROCEDURE — 85027 COMPLETE CBC AUTOMATED: CPT

## 2024-08-29 PROCEDURE — 93000 ELECTROCARDIOGRAM COMPLETE: CPT | Performed by: NURSE PRACTITIONER

## 2024-08-29 RX ORDER — ATENOLOL 25 MG/1
25 TABLET ORAL DAILY
Qty: 90 TABLET | Refills: 3 | Status: SHIPPED | OUTPATIENT
Start: 2024-08-29

## 2024-08-29 RX ADMIN — METHYLPREDNISOLONE ACETATE 80 MG: 80 INJECTION, SUSPENSION INTRA-ARTICULAR; INTRALESIONAL; INTRAMUSCULAR; SOFT TISSUE at 11:35

## 2024-08-29 RX ADMIN — LIDOCAINE HYDROCHLORIDE 3 ML: 10 INJECTION, SOLUTION EPIDURAL; INFILTRATION; INTRACAUDAL; PERINEURAL at 11:35

## 2024-08-29 NOTE — PROGRESS NOTES
Ankle Follow Up      Patient: Sakina Guerrier    YOB: 1965 59 y.o. female    Chief Complaints: Ankle pain    History of Present Illness:Patient was seen on 10/31/2022 reporting a 6-month history of some increased pain and swelling in the left hindfoot more at the sinus tarsi than medial hindfoot.  She reported that she had always been somewhat flat-footed and felt that this may have progressed to some degree but not dramatically.  She had been doing some physical therapy as ordered by Dr. Luis for this and also therapy for her hip which she had replaced by   in May.      She also reported history of previous left fifth metatarsal fracture treated operatively and had subsequent hardware removal.  She also had bilateral third interdigital neuroma excisions all the surgeries were performed by podiatry.         She was felt to have some exacerbation of pes planus with possible posterior tib tendon dysfunction with sinus tarsi impingement and symptomatic accessory navicular.     She is instructed on heel cord stretching exercises and fitted with a PT TD brace and sent for MRI.     Patient was seen on 12/15/2022 reporting that she still has some pain in the ankle mostly in the sinus tarsi more so than any medially.     We reviewed her MRI and she was felt to have lateral impingement in the sinus tarsi with a sensory navicular with some posterior tib tendon dysfunction.  She did not desire any surgical treatment nor was any recommended at that time.  She was leaving in a few days to go to [place] for 3 months.  We decided to hold off on therapy or custom orthotics with her leaving soon and discussed other measures.  She is instructed on obtaining power step orthotics and fitted with a medial heel wedge and sinus tarsi was injected.  She was to been seen back 3 months thereafter     Patient was not seen back again until 8/14/2023.  She had returned from [place] in April.  She had been busy over  the summer.  She had been using the power step orthotics and heel wedge some but not really doing any heel cord stretching exercises.  She reported that the injection did help but was not long-lasting.     Although the injection had given her some relief she had recurrent exacerbation of symptoms and she denies any surgical treatment.  She instructed increase heel cord stretching exercises and continue with power step orthotics and medial heel wedge.  We discussed custom orthotics which she wanted to hold off on.  She was prescribed compounding cream and referred to see Augusto at [facility] for therapy.  She requested additional injection which I felt was reasonable in the left sinus tarsi was injected with steroid lidocaine mixture.     Patient was seen on 12/11/2023 stating that the size tarsi injection from 8/14/2023  helped quite a bit after 2 weeks.  She had had decreased swelling and bruising and improvement in pain.  However with increased activity about 2 weeks her she had some recurrence of symptoms of pain in the sinus tarsi but not as bad as it was before the previous injection.  She had been doing her heel cord stretching exercises and using compounding cream which she said helped.  She did do therapy through [facility] but not with Augusto at [facility].  She was continuing to do home exercises.  She had continued using power step orthotics with medial heel wedge.     We discussed treatment and she did not desire any surgical treatment at that time.  She had some exacerbation of previous symptoms but not to the degree that it had prior to her previous injection.  We discussed custom orthotics which she wanted to hold off on.  She is instructed continue with home therapy with power step orthotic and medial heel wedge and compounding cream and left sinus tarsi was injected.  At that time she was leaving to go to [place] for several months.     Patient seen on 4/22/2024 stating her ankle was still bothering  her.  She did have some relief from the sinus tarsi injection but had recurrent pain in the inferolateral aspect of her left hindfoot and the sinus tarsi really more so than any medially.  She she had continued with stretching and compounding cream as well as power step orthotic with medial heel wedge.     That time we discussed treatment and she was interested in pursuing surgical treatment.  Reviewed with her that would likely need to have a calcaneal displacement osteotomy and debridement of her posterior tib tendon accessory navicular with possible posterior tib tendon advancement and possible FDL tendon transfer and medial cuneiform osteotomy.  She did not want to do anything until sometime in July and was quite a bit to go through.  We discussed other opinions and felt it was a good idea to get an opinion from Dr. Patton prior to proceeding.     Patient was seen on 7/11/2024 and had seen Dr. Patton that morning.  And per patient's review he was in agreement with the proposed treatment plan and would have done the same thing with calcaneal displacement osteotomy and cotton osteotomy as well as posterior tib tendon debridement removal of accessory navicular and likely FDL tendon transfer.  Patient was having persistent pain in the sinus tarsi due to asymmetric lateral impingement likely from posterior tib tendon dysfunction although did not show gross abnormality on previous MRI.  She had been unimproved with conservative measures.    At that time we discussed proceeding with surgical treatment with calcaneal osteotomy with plate fixation as well as likely cotton osteotomy the medial cuneiform and debridement of the posterior tib tendon with removal of accessory navicular and possible FDL tendon transfer.  She was cleaning her standing of this and understood the postoperative course with associated risk benefits potential outcomes and complications.  We had planned on doing this sometime in September while she was  "in town.    However she subsequently called and realized that her insurance did not cover physical therapy in [place] where she was planning to go for the winter for rehab and decision made to postpone the surgery until she was 21 back in [place] for an extended period of time.    Patient is seen back today with persistent symptoms of lateral impingement with posterior tib tendon dysfunction in the left hindfoot and midfoot.  HPI    ROS: ankle pain  Past Medical History:   Diagnosis Date   • Acne    • Acquired hypothyroidism    • Anxiety    • Cancer 09/2007    VULVAR CARCINOMA IN SITU   • Cholelithiasis 9/2016    Gallbladder removed   • Colon polyp 2017    Found last colonoscopy   • Depression    • Endometriosis    • Fear of flying    • GERD (gastroesophageal reflux disease) 2021   • Hammertoe of right foot 04/24/2015    4TH RIGHT TOE   • Hemorrhoids    • Hip arthrosis 9 months   • Influenza A 02/06/2018   • Lateral epicondylitis of right elbow 07/26/2013   • Left knee pain 10/19/2017    SAW DR. GHASSAN COLE   • Left ovarian cyst 08/2006    FOLLICLE CYST   • Lesion of left plantar nerve    • Menopause    • Neuroma of foot 5 years    Surgery to correct   • Onychomycosis 03/18/2014   • Primary insomnia    • Rheumatic fever     AS A CHILD   • Right hip pain    • Syncope 02/07/2018    SEEN AT [facility]   • Thrombosed hemorrhoids 09/26/2017    SAW DR. FAUZIA GIRON   • Ventricular tachycardia        Physical Exam:   Vitals:    08/29/24 1104   Temp: 97.1 °F (36.2 °C)   Weight: 75.5 kg (166 lb 6.4 oz)   Height: 170.2 cm (67\")   PainSc:   7     Well developed with normal mood.  On exam she has hindfoot valgus with arch collapse and midfoot abduction.  She is tender in the sinus tarsi.  She at least 4+ out of 5 inversion strength and hindfoot motion remains supple with 15 degrees dorsiflexion and 60 degrees plantarflexion with good anterior tib tendon strength.      Radiology: 3 views of the left foot ordered evaluate " alignment and pain reviewed and compared to his x-rays.  Also reviewed x-rays of the left ankle that patient brought in from [facility] from 7/11/2024.  Foot x-rays do not show any change compared with previous x-rays there does appear to be adequate medial cuneiform for osteotomy if needed.  There is some arthritis at the midfoot at the second and third TMT joints.  Ankle x-rays show no malalignment of the talus within the mortise does appear to be some lateral hindfoot impingement.        MRI films and report of the left hindfoot dated 11/29/2022 reviewed which showed narrowing of the tarsal sinus posteriorly with loss of fat signal in that area and adjacent mild marrow edema of the calcaneus     Plantar lateral talar cortex abuts the dorsal cortex of the anterior calcaneal process.  Posterior tib tendon maintains normal signal and thickness with no tendon sheath effusion.  There is a 6 mm accessory ossicle within the substance of the distal posterior tib tendon.  Other ankle ligaments and tendons appeared intact at that articular cartilage.        Assessment/Plan:  Left pes planus with sinus tarsi impingement  2.  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    We discussed treatment going forward and we will plan to proceed with surgery as outlined previously and do this in January.  Will plan on medial displacement calcaneal osteotomy with plate fixation as well as likely cotton osteotomy of the medial cuneiform as well as debridement of the posterior tib tendon with removal of the accessory navicular and likely FDL tendon transfer to navicular.  Although previous MRI had not shown clear posterior tib tendon pathology it is obviously dysfunctional.     Patient voiced clear understanding the operative procedure and postoperative course.  She will be nonweightbearing for at least 6 weeks postoperatively and will have interval splint/cast changes in the interim.     Risk  benefits potential outcomes and complications reviewed of which she voiced clear understanding and can include but are not limited to heart attack stroke death pneumonia infection bleeding damage to blood vessels nerves or tendons blood clots pulmonary embolism persistent or worsening pain stiffness instability need for subsequent surgery and failure to return to presurgery and precondition levels of activity as well as failure of osteotomy healing and tendon rupture as well as wound healing complications and even catastrophic complications resulting in loss of the leg.     All of her questions were answered to her full satisfaction and she would like to proceed with this sometime in January.    In the interim she will continue with accommodative shoes with good arch support we discussed other treatment options and after verbal consent and sterile preparation with discussion of risks which can include infection the area left sinus tarsi was injected with steroid lidocaine mixture.  Patient tolerated the injection well and postinjection instructions were reviewed.    She will call if she has any questions prior to surgery in January and she will get with Jordyn about scheduling this.    Medium Joint Arthrocentesis: L ankle  Date/Time: 8/29/2024 11:35 AM  Consent given by: patient  Site marked: site marked  Timeout: Immediately prior to procedure a time out was called to verify the correct patient, procedure, equipment, support staff and site/side marked as required   Supporting Documentation  Indications: pain   Procedure Details  Location: ankle - L ankle  Preparation: Patient was prepped and draped in the usual sterile fashion  Needle size: 22 G  Approach: anterolateral  Medications administered: 80 mg methylPREDNISolone acetate 80 MG/ML; 3 mL lidocaine PF 1% 1 %  Patient tolerance: patient tolerated the procedure well with no immediate complications           yes

## 2024-08-29 NOTE — TELEPHONE ENCOUNTER
Call patient with lab test results.  Does not look like she is dehydrated white blood cell count is a little elevated and follow-up with PCP for that.

## 2024-08-29 NOTE — PROGRESS NOTES
Date of Office Visit: 2024  Encounter Provider: RANDA Duke  Place of Service: [facility] CARDIOLOGY  Patient Name: Sakina Guerrier  :1965    Chief complaint: Palpitations    HPI: Sakina Guerrier is a 59 y.o. female who is a patient of patient Dr. Muir is new to me today.  We first saw her in  when she presented with palpitations he attributed the palpitations for about 10 years but had been increasing in frequency.  She had a normal echocardiogram she does have a history of rheumatic fever.  She wore an event monitor that showed 10 seconds of a wide-complex tachycardia 140 bpm.  We started her on a low-dose beta-blocker because her echo was normal.  She is also given in  she needed clearance for hip replacement.  She was stable she denied any palpitations and we cleared her for surgery.  She is here today for follow-up.    She comes in today she had been doing well on atenolol.  She lives in [place] part of the year and has been there recently and has been having episodes where she will suddenly feel dizzy.  She has been on the same dose of atenolol for few years now without difficulty.  She has been in the heat due to being in [place] but has done that before.  She is was laying in bed a week before last in [place] and had a sudden onset of dizziness.  She went to the [facility] and her blood pressure was in the 80s.  She was sent to the emergency room and told to drink fluids for hydration.  She did not get any care in the emergency room so she actually signed herself out.  Since that time she has been drinking about 80 ounces of water a day and adding some Gatorade and electrolyte enhancement.  She cut her atenolol dose in half and has not had any further episodes.  Blood pressure usually is in the low 100s for her and is 100/70 today.  She has not had any loss of consciousness, no palpitations or chest discomfort.  Previous testing and  notes have been reviewed by me.   Past Medical History:   Diagnosis Date    Acne     Acquired hypothyroidism     Anxiety     Cancer 2007    VULVAR CARCINOMA IN SITU    Cholelithiasis 2016    Gallbladder removed    Colon polyp     Found last colonoscopy    Depression     Endometriosis     Fear of flying     GERD (gastroesophageal reflux disease)     Hammertoe of right foot 2015    4TH RIGHT TOE    Hemorrhoids     Hip arthrosis 9 months    Influenza A 2018    Lateral epicondylitis of right elbow 2013    Left knee pain 10/19/2017    SAW DR. GHASSAN COLE    Left ovarian cyst 2006    FOLLICLE CYST    Lesion of left plantar nerve     Menopause     Neuroma of foot 5 years    Surgery to correct    Onychomycosis 2014    Primary insomnia     Rheumatic fever     AS A CHILD    Right hip pain     Syncope 2018    SEEN AT [facility]    Thrombosed hemorrhoids 2017    SAW DR. FAUZIA GIRON    Ventricular tachycardia        Past Surgical History:   Procedure Laterality Date    BREAST SURGERY Bilateral 2005    AUGMENTATION MAMMOPLASTY, DR. TONY FERGUSON AT [facility]     SECTION N/A     CHOLECYSTECTOMY N/A     DONE IN [place]    COLONOSCOPY N/A 2017    ENTIRE COLON WNL, RESCOPE IN 5 YRS, DR. FAUZIA GIRON AT [facility]    COLPOSCOPY W/ BIOPSY / CURETTAGE N/A 2007    BX OF PERIANAL LESION, PATH: SEVERE DYSPLASIA, CARCINOMA IN SITU, DR.REBECCA MICHELLE AT [facility]    ENDOMETRIAL ABLATION N/A 2006    WITH EUA, LYSIS OF ADHESIONS, AND ASPIRATION OF LEFT OVARIAN FOLLICLE CYST, DR. TONY MICHELLE AT [facility]    FOOT SURGERY Left     plate and pen removal     HIP SURGERY      JOINT REPLACEMENT  May 2022    R hip    NEUROMA SURGERY Bilateral 02/10/2016    right third intermetatarsal space; [facility]; Lee James DPM    TOTAL HIP ARTHROPLASTY Right 2022    Procedure: Right anterior total hip arthroplasty;  Surgeon: Maciel Luis MD;  Location: [facility]  OR;  Service: Orthopedics;  Laterality: Right;       Social History     Socioeconomic History    Marital status: Single   Tobacco Use    Smoking status: Never    Smokeless tobacco: Never    Tobacco comments:     Caffeine - Yes   Vaping Use    Vaping status: Never Used   Substance and Sexual Activity    Alcohol use: Yes     Alcohol/week: 1.0 standard drink of alcohol     Types: 1 Drinks containing 0.5 oz of alcohol per week     Comment: rare    Drug use: No    Sexual activity: Yes     Partners: Male     Birth control/protection: Post-menopausal       Family History   Problem Relation Age of Onset    Heart disease Father     Depression Father     Mental illness Father     Cancer Father     Heart disease Mother     Cancer Mother     Hypertension Mother     Depression Mother     Colon polyps Mother     Mental illness Mother     No Known Problems Son     No Known Problems Daughter     Colon cancer Maternal Grandfather     Breast cancer Maternal Aunt     Malig Hyperthermia Neg Hx     Ovarian cancer Neg Hx     Uterine cancer Neg Hx        Review of Systems   Constitutional: Negative for diaphoresis and malaise/fatigue.   Cardiovascular:  Negative for chest pain, claudication, dyspnea on exertion, irregular heartbeat, leg swelling, near-syncope, orthopnea, palpitations, paroxysmal nocturnal dyspnea and syncope.   Respiratory:  Negative for cough, shortness of breath and sleep disturbances due to breathing.    Musculoskeletal:  Negative for falls.   Neurological:  Positive for dizziness. Negative for weakness.   Psychiatric/Behavioral:  Negative for altered mental status and substance abuse.        No Known Allergies      Current Outpatient Medications:     atenolol (TENORMIN) 25 MG tablet, TAKE ONE TABLET BY MOUTH DAILY, Disp: 90 tablet, Rfl: 3    cephalexin (KEFLEX) 500 MG capsule, TAKE 4 CAPSULES BY MOUTH 1 HOUR PRIOR TO DENTAL CLEANING OR PROCEDURE, Disp: 4 capsule, Rfl: 2    cycloSPORINE (RESTASIS) 0.05 % ophthalmic  "emulsion, Administer 1 drop to both eyes Every 12 (Twelve) Hours., Disp: , Rfl:     omeprazole (priLOSEC) 40 MG capsule, TAKE 1 CAPSULE BY MOUTH DAILY, Disp: 90 capsule, Rfl: 0    PARoxetine (PAXIL) 40 MG tablet, Take 1 tablet by mouth Every Morning., Disp: 90 tablet, Rfl: 0    vitamin D3 125 MCG (5000 UT) capsule capsule, Take 2,000 Units by mouth Daily., Disp: , Rfl:     zolpidem (AMBIEN) 5 MG tablet, TAKE ONE TABLET BY MOUTH ONCE NIGHTLY AS NEEDED FOR SLEEP, Disp: 30 tablet, Rfl: 0  No current facility-administered medications for this visit.    Facility-Administered Medications Ordered in Other Visits:     Chlorhexidine Gluconate Cloth 2 % pads, , Apply externally, Take As Directed, Peter Muir MD      Objective:     Vitals:    08/29/24 1437   BP: 100/70   BP Location: Left arm   Patient Position: Sitting   Pulse: 74   SpO2: 97%   Weight: 74.8 kg (165 lb)   Height: 170.2 cm (67\")     Body mass index is 25.84 kg/m².    PHYSICAL EXAM:    Constitutional:       General: Not in acute distress.     Appearance: Normal appearance. Well-developed.   Eyes:      Pupils: Pupils are equal, round, and reactive to light.   HENT:      Head: Normocephalic.   Neck:      Vascular: No carotid bruit or JVD.   Pulmonary:      Effort: Pulmonary effort is normal. No tachypnea.      Breath sounds: Normal breath sounds. No wheezing. No rales.   Cardiovascular:      Normal rate. Regular rhythm.      No gallop.    Pulses:     Intact distal pulses.   Edema:     Peripheral edema absent.   Abdominal:      General: Bowel sounds are normal.      Palpations: Abdomen is soft.      Tenderness: There is no abdominal tenderness.   Musculoskeletal: Normal range of motion.      Cervical back: Normal range of motion and neck supple. No edema. Skin:     General: Skin is warm and dry.   Neurological:      Mental Status: Alert and oriented to person, place, and time.           ECG 12 Lead    Date/Time: 8/29/2024 2:57 PM  Performed by: Vicky Pichardo " RANDA Murphy    Authorized by: Vicky Pichardo APRN  Comparison: compared with previous ECG from 4/22/2022  Similar to previous ECG  Rhythm: sinus rhythm  Rate: normal  ST Flattening: III, V4, V5 and V6  QRS axis: normal  Other findings: non-specific ST-T wave changes    Clinical impression: non-specific ECG          Assessment:      1.  Dizziness-will order Holter monitor and labs.  She also complains that she has been bruising will also check a CBC    2.  History of wide-complex tachycardia on atenolol we will get a heart monitor to make sure these episodes are not occurring for longer durations of time causing dizziness.  Plan:       Follow-up in 3 months with Dr. Muir.         Your medication list            Accurate as of August 29, 2024  2:54 PM. If you have any questions, ask your nurse or doctor.                CONTINUE taking these medications        Instructions Last Dose Given Next Dose Due   atenolol 25 MG tablet  Commonly known as: TENORMIN      TAKE ONE TABLET BY MOUTH DAILY       cephalexin 500 MG capsule  Commonly known as: KEFLEX      TAKE 4 CAPSULES BY MOUTH 1 HOUR PRIOR TO DENTAL CLEANING OR PROCEDURE       cycloSPORINE 0.05 % ophthalmic emulsion  Commonly known as: RESTASIS      Administer 1 drop to both eyes Every 12 (Twelve) Hours.       omeprazole 40 MG capsule  Commonly known as: priLOSEC      TAKE 1 CAPSULE BY MOUTH DAILY       PARoxetine 40 MG tablet  Commonly known as: PAXIL      Take 1 tablet by mouth Every Morning.       vitamin D3 125 MCG (5000 UT) capsule capsule      Take 2,000 Units by mouth Daily.       zolpidem 5 MG tablet  Commonly known as: AMBIEN      TAKE ONE TABLET BY MOUTH ONCE NIGHTLY AS NEEDED FOR SLEEP                  As always, it has been a pleasure to participate in your patient's care.      Sincerely,     Vicky TOLENTINO

## 2024-08-30 RX ORDER — LIDOCAINE HYDROCHLORIDE 10 MG/ML
3 INJECTION, SOLUTION EPIDURAL; INFILTRATION; INTRACAUDAL; PERINEURAL
Status: COMPLETED | OUTPATIENT
Start: 2024-08-29 | End: 2024-08-29

## 2024-08-30 RX ORDER — METHYLPREDNISOLONE ACETATE 80 MG/ML
80 INJECTION, SUSPENSION INTRA-ARTICULAR; INTRALESIONAL; INTRAMUSCULAR; SOFT TISSUE
Status: COMPLETED | OUTPATIENT
Start: 2024-08-29 | End: 2024-08-29

## 2024-09-25 ENCOUNTER — PATIENT MESSAGE (OUTPATIENT)
Dept: CARDIOLOGY | Age: 59
End: 2024-09-25

## 2024-09-26 ENCOUNTER — TELEPHONE (OUTPATIENT)
Dept: CARDIOLOGY | Facility: CLINIC | Age: 59
End: 2024-09-26
Payer: MEDICAID

## 2024-11-04 ENCOUNTER — TELEPHONE (OUTPATIENT)
Dept: GASTROENTEROLOGY | Facility: CLINIC | Age: 59
End: 2024-11-04

## 2024-11-04 NOTE — TELEPHONE ENCOUNTER
Caller: Sakina Guerrier    Relationship: Self    Best call back number: [phone]    Requested Prescriptions: omeprazole (priLOSEC) 40 MG capsule   Requested Prescriptions      No prescriptions requested or ordered in this encounter        Pharmacy where request should be sent:  [facility] [id number] [facility] [id number] [address] [address] [address] AT [facility] [address] [facility] - [phone]  - [phone]      Last office visit with prescribing clinician: 6/22/2023   Last telemedicine visit with prescribing clinician: Visit date not found   Next office visit with prescribing clinician: Visit date not found     Additional details provided by patient: PT CALLING TO GET REFILL. PT WANTS TO KNOW IF SHE NEEDS TO BE SEEN FIRST. PLEASE CONTACT PT TO ADVISE.     Does the patient have less than a 3 day supply:  [x] Yes  [] No    Would you like a call back once the refill request has been completed: [x] Yes [] No    If the office needs to give you a call back, can they leave a voicemail: [x] Yes [] No    Dain Anderson Rep   11/04/24 09:20 EST

## 2024-11-04 NOTE — TELEPHONE ENCOUNTER
Called pt and advised pt that she was last seen 06/2023 and she is due for a f/u.  She verb understanding and reports she has an upcoming appt with her family MD and is going to see if they can order her med .  If not she will call back and make appt.

## 2024-11-25 ENCOUNTER — OFFICE VISIT (OUTPATIENT)
Dept: INTERNAL MEDICINE | Facility: CLINIC | Age: 59
End: 2024-11-25
Payer: MEDICAID

## 2024-11-25 VITALS
HEIGHT: 67 IN | WEIGHT: 170 LBS | DIASTOLIC BLOOD PRESSURE: 88 MMHG | SYSTOLIC BLOOD PRESSURE: 122 MMHG | HEART RATE: 69 BPM | BODY MASS INDEX: 26.68 KG/M2 | OXYGEN SATURATION: 99 %

## 2024-11-25 DIAGNOSIS — Z51.81 THERAPEUTIC DRUG MONITORING: ICD-10-CM

## 2024-11-25 DIAGNOSIS — E66.3 OVERWEIGHT WITH BODY MASS INDEX (BMI) OF 26 TO 26.9 IN ADULT: ICD-10-CM

## 2024-11-25 DIAGNOSIS — F41.9 ANXIETY: Primary | Chronic | ICD-10-CM

## 2024-11-25 DIAGNOSIS — F51.04 PSYCHOPHYSIOLOGICAL INSOMNIA: Chronic | ICD-10-CM

## 2024-11-25 DIAGNOSIS — K21.9 GASTROESOPHAGEAL REFLUX DISEASE WITHOUT ESOPHAGITIS: Chronic | ICD-10-CM

## 2024-11-25 PROCEDURE — 1125F AMNT PAIN NOTED PAIN PRSNT: CPT | Performed by: NURSE PRACTITIONER

## 2024-11-25 PROCEDURE — 1160F RVW MEDS BY RX/DR IN RCRD: CPT | Performed by: NURSE PRACTITIONER

## 2024-11-25 PROCEDURE — 99214 OFFICE O/P EST MOD 30 MIN: CPT | Performed by: NURSE PRACTITIONER

## 2024-11-25 PROCEDURE — 1159F MED LIST DOCD IN RCRD: CPT | Performed by: NURSE PRACTITIONER

## 2024-11-25 RX ORDER — ZOLPIDEM TARTRATE 5 MG/1
5 TABLET ORAL NIGHTLY PRN
Qty: 30 TABLET | Refills: 0 | Status: SHIPPED | OUTPATIENT
Start: 2024-11-25

## 2024-11-25 RX ORDER — PAROXETINE 40 MG/1
40 TABLET, FILM COATED ORAL EVERY MORNING
Qty: 90 TABLET | Refills: 0 | Status: SHIPPED | OUTPATIENT
Start: 2024-11-25

## 2024-11-25 RX ORDER — OMEPRAZOLE 40 MG/1
40 CAPSULE, DELAYED RELEASE ORAL DAILY
Qty: 90 CAPSULE | Refills: 2 | Status: SHIPPED | OUTPATIENT
Start: 2024-11-25

## 2024-11-25 RX ORDER — ATENOLOL 25 MG/1
12.5 TABLET ORAL DAILY
COMMUNITY

## 2024-11-25 NOTE — PROGRESS NOTES
"Chief Complaint  Weight loss     Subjective        Sakina Guerrier presents to [facility]  History of Present Illness  This is a 60 y/o female presenting to office for f/u with chronic health conditions.     Continues on atenolol for hx of palpitations-- on 12.5mg now; reports she is doing well on this.    Continues on Paxil for hx of anxiety-- reports mood is stable; denies SI. Needs refill.     Continues using Ambien sparingly for insomnia r/t travel; she is aware of the risks of taking this; denies any S/E form medication. Only using this rarely.     She is concerned about her weight gain and is curious what is available for weight loss. She reports she has tried diets but these do not work for her. Reports she likes to walk for exercise.           Objective   Vital Signs:  /88 (BP Location: Left arm, Patient Position: Sitting, Cuff Size: Adult)   Pulse 69   Ht 170.2 cm (67\")   Wt 77.1 kg (170 lb)   SpO2 99%   BMI 26.63 kg/m²   Estimated body mass index is 26.63 kg/m² as calculated from the following:    Height as of this encounter: 170.2 cm (67\").    Weight as of this encounter: 77.1 kg (170 lb).          Physical Exam  Constitutional:       Appearance: Normal appearance.   HENT:      Head: Normocephalic and atraumatic.      Right Ear: External ear normal.      Left Ear: External ear normal.      Nose: Nose normal.      Mouth/Throat:      Mouth: Mucous membranes are moist.      Pharynx: Oropharynx is clear.   Eyes:      Pupils: Pupils are equal, round, and reactive to light.   Cardiovascular:      Rate and Rhythm: Normal rate and regular rhythm.      Pulses: Normal pulses.      Heart sounds: No murmur heard.     No gallop.   Pulmonary:      Effort: Pulmonary effort is normal. No respiratory distress.      Breath sounds: Normal breath sounds. No stridor. No wheezing, rhonchi or rales.   Musculoskeletal:      Cervical back: Normal range of motion.   Neurological:      Mental " Status: She is alert and oriented to person, place, and time. Mental status is at baseline.   Psychiatric:         Mood and Affect: Mood normal.         Thought Content: Thought content normal.         Judgment: Judgment normal.        Result Review :  The following data was reviewed by: RANDA Stanford on 11/25/2024:  Common labs          8/29/2024    15:59   Common Labs   Glucose 108    BUN 16    Creatinine 0.85    Sodium 137    Potassium 4.7    Chloride 103    Calcium 9.3    WBC 11.61    Hemoglobin 12.9    Hematocrit 39.8    Platelets 251      Tobacco Use: Low Risk  (11/25/2024)    Patient History     Smoking Tobacco Use: Never     Smokeless Tobacco Use: Never     Passive Exposure: Not on file     Social History     Substance and Sexual Activity   Alcohol Use Yes    Alcohol/week: 1.0 standard drink of alcohol    Types: 1 Drinks containing 0.5 oz of alcohol per week    Comment: rare     Family History   Problem Relation Age of Onset    Heart disease Father     Depression Father     Mental illness Father     Cancer Father     Heart disease Mother     Cancer Mother     Hypertension Mother     Depression Mother     Colon polyps Mother     Mental illness Mother     Anxiety disorder Mother     Arthritis Mother     No Known Problems Son     No Known Problems Daughter     Colon cancer Maternal Grandfather     Breast cancer Maternal Aunt     Malig Hyperthermia Neg Hx     Ovarian cancer Neg Hx     Uterine cancer Neg Hx                Assessment and Plan   Diagnoses and all orders for this visit:    1. Anxiety (Primary)  Assessment & Plan:  Stable on paxil  Denies SI    Orders:  -     PARoxetine (PAXIL) 40 MG tablet; Take 1 tablet by mouth Every Morning.  Dispense: 90 tablet; Refill: 0    2. Psychophysiological insomnia  Assessment & Plan:  PDMP reviewed  Continues on ambien sparingly for travel  Advised chronic use of ambien has been associated with early memory loss  Patient verbalized understanding  Contract on  file  UDS today    Orders:  -     zolpidem (AMBIEN) 5 MG tablet; Take 1 tablet by mouth At Night As Needed for Sleep.  Dispense: 30 tablet; Refill: 0    3. Therapeutic drug monitoring  -     Compliance Drug Analysis, Ur - Urine, Clean Catch    4. Gastroesophageal reflux disease without esophagitis  Assessment & Plan:  Stable on omeprazole 40mg daily      Orders:  -     omeprazole (priLOSEC) 40 MG capsule; Take 1 capsule by mouth Daily.  Dispense: 90 capsule; Refill: 2    5. Overweight with body mass index (BMI) of 26 to 26.9 in adult  Assessment & Plan:  Patient's (Body mass index is 26.63 kg/m².) indicates that they are overweight with health conditions that include GERD . Weight is unchanged. BMI is above average; BMI management plan is completed. We discussed portion control, increasing exercise, joining a fitness center or start home based exercise program, Weight Watchers or other Commercial based weight reduction program, decreasing alcohol consumption, and an [place]-based approach such as Ipselex Pal or Lose It.   Current recommendations according to the current Physical Activity Guidelines for Americans: adults need 150-300 minutes of physical exercise weekly. It is also recommended to perform two sessions of full body strength training exercise weekly which includes all major muscle groups including legs, hips, back, abdomen, chest, shoulders, and arms.   Current CDC recommendations for diet include following a diet that emphasizes fruits, vegetables, whole grains that is low in saturated fats and low in sugar intake.   Adults should consume at least 3 cup equivalents of fruit and vegetables daily. It is also beneficial to get 25 grams of fiber daily unless told otherwise by your healthcare provider.                  Follow Up   Return in about 6 weeks (around 1/6/2025) for Annual physical.  Patient was given instructions and counseling regarding her condition or for health maintenance advice. Please see  specific information pulled into the AVS if appropriate.

## 2024-11-25 NOTE — ASSESSMENT & PLAN NOTE
Patient's (Body mass index is 26.63 kg/m².) indicates that they are overweight with health conditions that include GERD . Weight is unchanged. BMI is above average; BMI management plan is completed. We discussed portion control, increasing exercise, joining a fitness center or start home based exercise program, Weight Watchers or other Commercial based weight reduction program, decreasing alcohol consumption, and an [place]-based approach such as [employer] Pal or Lose It.   Current recommendations according to the current Physical Activity Guidelines for Americans: adults need 150-300 minutes of physical exercise weekly. It is also recommended to perform two sessions of full body strength training exercise weekly which includes all major muscle groups including legs, hips, back, abdomen, chest, shoulders, and arms.   Current CDC recommendations for diet include following a diet that emphasizes fruits, vegetables, whole grains that is low in saturated fats and low in sugar intake.   Adults should consume at least 3 cup equivalents of fruit and vegetables daily. It is also beneficial to get 25 grams of fiber daily unless told otherwise by your healthcare provider.

## 2024-11-25 NOTE — ASSESSMENT & PLAN NOTE
PDMP reviewed  Continues on ambien sparingly for travel  Advised chronic use of ambien has been associated with early memory loss  Patient verbalized understanding  Contract on file  UDS today

## 2024-12-03 LAB — DRUGS UR: NORMAL

## 2024-12-23 DIAGNOSIS — F41.9 ANXIETY: Chronic | ICD-10-CM

## 2024-12-26 RX ORDER — PAROXETINE 40 MG/1
40 TABLET, FILM COATED ORAL EVERY MORNING
Qty: 90 TABLET | Refills: 0 | Status: SHIPPED | OUTPATIENT
Start: 2024-12-26

## 2025-01-09 ENCOUNTER — TELEPHONE (OUTPATIENT)
Dept: OBSTETRICS AND GYNECOLOGY | Facility: CLINIC | Age: 60
End: 2025-01-09
Payer: MEDICAID

## 2025-01-24 ENCOUNTER — PREP FOR SURGERY (OUTPATIENT)
Dept: OTHER | Facility: HOSPITAL | Age: 60
End: 2025-01-24
Payer: MEDICAID

## 2025-01-24 ENCOUNTER — TELEPHONE (OUTPATIENT)
Dept: ORTHOPEDIC SURGERY | Facility: CLINIC | Age: 60
End: 2025-01-24
Payer: MEDICAID

## 2025-01-24 DIAGNOSIS — Q74.2 PAIN ASSOCIATED WITH ACCESSORY NAVICULAR BONE OF FOOT, LEFT: ICD-10-CM

## 2025-01-24 DIAGNOSIS — M79.672 PAIN ASSOCIATED WITH ACCESSORY NAVICULAR BONE OF FOOT, LEFT: ICD-10-CM

## 2025-01-24 DIAGNOSIS — M76.829 POSTERIOR TIBIAL TENDON DYSFUNCTION: Primary | ICD-10-CM

## 2025-01-24 DIAGNOSIS — M25.572 SINUS TARSITIS OF LEFT FOOT: ICD-10-CM

## 2025-01-24 DIAGNOSIS — M25.872 IMPINGEMENT SYNDROME OF LEFT ANKLE: ICD-10-CM

## 2025-01-24 DIAGNOSIS — M21.42 PES PLANUS OF LEFT FOOT: ICD-10-CM

## 2025-01-24 NOTE — TELEPHONE ENCOUNTER
Patient called and is ready to schedule surgery for 5/6/25. Patient will need new case request and understands she will need to be seen in office prior to surgery.

## 2025-01-27 ENCOUNTER — PROCEDURE VISIT (OUTPATIENT)
Dept: OBSTETRICS AND GYNECOLOGY | Facility: CLINIC | Age: 60
End: 2025-01-27
Payer: MEDICAID

## 2025-01-27 ENCOUNTER — OFFICE VISIT (OUTPATIENT)
Dept: OBSTETRICS AND GYNECOLOGY | Facility: CLINIC | Age: 60
End: 2025-01-27
Payer: MEDICAID

## 2025-01-27 VITALS
HEIGHT: 67 IN | WEIGHT: 171 LBS | BODY MASS INDEX: 26.84 KG/M2 | HEART RATE: 100 BPM | SYSTOLIC BLOOD PRESSURE: 98 MMHG | DIASTOLIC BLOOD PRESSURE: 68 MMHG

## 2025-01-27 DIAGNOSIS — Z01.419 ENCOUNTER FOR WELL WOMAN EXAM WITH ROUTINE GYNECOLOGICAL EXAM: Primary | ICD-10-CM

## 2025-01-27 DIAGNOSIS — Z12.31 BREAST CANCER SCREENING BY MAMMOGRAM: ICD-10-CM

## 2025-01-27 DIAGNOSIS — Z78.0 POSTMENOPAUSAL STATUS: ICD-10-CM

## 2025-01-27 DIAGNOSIS — Z12.4 CERVICAL CANCER SCREENING: ICD-10-CM

## 2025-01-27 DIAGNOSIS — Z12.31 VISIT FOR SCREENING MAMMOGRAM: Primary | ICD-10-CM

## 2025-01-27 NOTE — PROGRESS NOTES
GYN Annual Exam     CC- Here for annual exam.     Sakina Guerrier is a 59 y.o. postmenopausal female who presents for annual well woman exam. She denies vaginal bleeding or vasomotor symptoms. She has no complaints today.      OB History          2    Para   2    Term                AB        Living             SAB        IAB        Ectopic        Molar        Multiple        Live Births                G1- C/S   G2-        Menopause - 53 years old   Current contraception: post menopausal status  History of abnormal Pap smear: yes - 5 years ago was HPV positive and underwent colposcopy that demonstrated no abnormalities   Family history of uterine, colon or ovarian cancer: yes - MGF diagnosed with colon cancer in his 60s   History of abnormal mammogram: no  Family history of breast cancer: yes - maternal aunt diagnosed at 50   Last Pap : 10/11/22- NILM, negative HPV   Last Completed Pap Smear            PAP SMEAR (Every 3 Years) Next due on 10/11/2025      10/11/2022  IGP, Apt HPV,rfx 16 / 18,45    2019  Done                   Last mammogram: 23- BIRADS-1  Last Completed Mammogram            Ordered - MAMMOGRAM (Every 2 Years) Ordered on 2023  Mammo Screening Digital Tomosynthesis Bilateral With CAD    10/11/2022  Mammo Screening Digital Tomosynthesis Bilateral With CAD    2019  Done                   Last colonoscopy:  2017- repeat in 10 years   Last Completed Colonoscopy            COLORECTAL CANCER SCREENING (COLONOSCOPY - Every 10 Years) Tentatively due on 2017  Surgical Procedure: COLONOSCOPY    2017  COLONOSCOPY                   Last DEXA: n/a; Frax score 6.6%, 0.5%  Parental Hip Fracture: denies     Past Medical History:   Diagnosis Date    Acne     Acquired hypothyroidism     Anxiety     Cancer 2007    VULVAR CARCINOMA IN SITU    Cholelithiasis 2016    Gallbladder removed    Colon polyp     Found last colonoscopy     Depression     Endometriosis     Fear of flying     GERD (gastroesophageal reflux disease)     Hammertoe of right foot 2015    4TH RIGHT TOE    Hemorrhoids     Hip arthrosis 9 months    Influenza A 2018    Lateral epicondylitis of right elbow 2013    Left knee pain 10/19/2017    SAW DR. GHASSAN COLE    Left ovarian cyst 2006    FOLLICLE CYST    Lesion of left plantar nerve     Menopause     Neuroma of foot 5 years    Surgery to correct    Onychomycosis 2014    Primary insomnia     Rheumatic fever     AS A CHILD    Right hip pain     Syncope 2018    SEEN AT [facility]    Thrombosed hemorrhoids 2017    SAW DR. FAUZIA GIRON    Ventricular tachycardia        Past Surgical History:   Procedure Laterality Date    BREAST SURGERY Bilateral 2005    AUGMENTATION MAMMOPLASTY, DR. TONY FERGUSON AT [facility]     SECTION N/A     CHOLECYSTECTOMY N/A     DONE IN [place]    COLONOSCOPY N/A 2017    ENTIRE COLON WNL, RESCOPE IN 5 YRS, DR. FAUZIA GIRON AT [facility]    COLPOSCOPY W/ BIOPSY / CURETTAGE N/A 2007    BX OF PERIANAL LESION, PATH: SEVERE DYSPLASIA, CARCINOMA IN SITU, DR.REBECCA MICHELLE AT [facility]    ENDOMETRIAL ABLATION N/A 2006    WITH EUA, LYSIS OF ADHESIONS, AND ASPIRATION OF LEFT OVARIAN FOLLICLE CYST, DR. TONY MICHELLE AT [facility]    FOOT SURGERY Left     plate and pen removal     HIP SURGERY      JOINT REPLACEMENT  May 2022    R hip    NEUROMA SURGERY Bilateral 02/10/2016    right third intermetatarsal space; [facility]; Lee James DPM    TOTAL HIP ARTHROPLASTY Right 2022    Procedure: Right anterior total hip arthroplasty;  Surgeon: Maciel Luis MD;  Location: [facility] OR;  Service: Orthopedics;  Laterality: Right;         Current Outpatient Medications:     atenolol (TENORMIN) 12.5 MG half tablet, Take 1 half tablet by mouth Daily., Disp: , Rfl:     cycloSPORINE (RESTASIS) 0.05 % ophthalmic emulsion, Administer 1 drop to both  "eyes Every 12 (Twelve) Hours., Disp: , Rfl:     omeprazole (priLOSEC) 40 MG capsule, Take 1 capsule by mouth Daily., Disp: 90 capsule, Rfl: 2    PARoxetine (PAXIL) 40 MG tablet, TAKE 1 TABLET BY MOUTH EVERY MORNING, Disp: 90 tablet, Rfl: 0    vitamin D3 125 MCG (5000 UT) capsule capsule, Take 2,000 Units by mouth Daily., Disp: , Rfl:     zolpidem (AMBIEN) 5 MG tablet, Take 1 tablet by mouth At Night As Needed for Sleep., Disp: 30 tablet, Rfl: 0  No current facility-administered medications for this visit.    Facility-Administered Medications Ordered in Other Visits:     Chlorhexidine Gluconate Cloth 2 % pads, , Apply externally, Take As Directed, Peter Muir MD    No Known Allergies    Social History     Tobacco Use    Smoking status: Never    Smokeless tobacco: Never    Tobacco comments:     Caffeine - Yes   Vaping Use    Vaping status: Never Used   Substance Use Topics    Alcohol use: Yes     Alcohol/week: 1.0 standard drink of alcohol     Types: 1 Drinks containing 0.5 oz of alcohol per week     Comment: rare    Drug use: No       Family History   Problem Relation Age of Onset    Heart disease Father     Depression Father     Mental illness Father     Cancer Father     Heart disease Mother     Cancer Mother     Hypertension Mother     Depression Mother     Colon polyps Mother     Mental illness Mother     Anxiety disorder Mother     Arthritis Mother     No Known Problems Son     No Known Problems Daughter     Colon cancer Maternal Grandfather     Breast cancer Maternal Aunt     Malig Hyperthermia Neg Hx     Ovarian cancer Neg Hx     Uterine cancer Neg Hx        Review of Systems   All other systems reviewed and are negative.      BP 98/68   Pulse 100   Ht 170.2 cm (67.01\")   Wt 77.6 kg (171 lb)   LMP 11/04/2016   BMI 26.78 kg/m²     Physical Exam  Vitals reviewed. Exam conducted with a chaperone present.   Constitutional:       General: She is not in acute distress.  HENT:      Head: Normocephalic and " atraumatic.      Right Ear: External ear normal.      Left Ear: External ear normal.   Eyes:      Extraocular Movements: Extraocular movements intact.      Pupils: Pupils are equal, round, and reactive to light.   Cardiovascular:      Rate and Rhythm: Normal rate.   Pulmonary:      Effort: Pulmonary effort is normal. No respiratory distress.   Chest:   Breasts:     Right: No swelling, bleeding, inverted nipple, mass, nipple discharge, skin change or tenderness.      Left: No swelling, bleeding, inverted nipple, mass, nipple discharge, skin change or tenderness.   Abdominal:      General: There is no distension.      Palpations: Abdomen is soft.      Tenderness: There is no abdominal tenderness. There is no guarding or rebound.   Genitourinary:     General: Normal vulva.      Exam position: Lithotomy position.      Labia:         Right: No rash, tenderness, lesion or injury.         Left: No rash, tenderness, lesion or injury.       Urethra: No prolapse or urethral swelling.      Vagina: No vaginal discharge, erythema, tenderness, bleeding or lesions.      Cervix: Normal.      Uterus: Not enlarged, not fixed and not tender.       Adnexa:         Right: No mass, tenderness or fullness.          Left: No mass, tenderness or fullness.     Musculoskeletal:         General: No deformity. Normal range of motion.      Cervical back: Normal range of motion and neck supple.   Lymphadenopathy:      Upper Body:      Right upper body: No supraclavicular or axillary adenopathy.      Left upper body: No supraclavicular or axillary adenopathy.      Lower Body: No right inguinal adenopathy. No left inguinal adenopathy.   Skin:     General: Skin is warm and dry.   Neurological:      General: No focal deficit present.      Mental Status: She is alert and oriented to person, place, and time.   Psychiatric:         Mood and Affect: Mood normal.         Behavior: Behavior normal.         Assessment     1) GYN annual well woman exam.   2)  Postmenopausal status  3) Breast cancer screening  4) Cervical cancer screening        Plan     1) Breast Health - Clinical breast exam & mammogram yearly, Self breast awareness monthly. Mammogram performed today for breast cancer screening.   2) Pap - Collected pap smear with HPV cotesting for cervical cancer screening today.   3) Smoking status- non-smoker.   4) Colon health - screening colonoscopy recommended if not up to date  5) Bone health - Weight bearing exercise, dietary calcium recommendations and vitamin D reviewed. Reviewed Frax score that did not indicated need for early DEXA scan at this time. Will plan to begin DEXA screening at age 65 unless indicated earlier.   6) Activity recommends - Adult 150-300 min/week of multi-component physical activities that include balance training, aerobic and physical strengthening.    7) Follow up prn and one year      Landy Horne MD

## 2025-01-29 DIAGNOSIS — F51.04 PSYCHOPHYSIOLOGICAL INSOMNIA: Chronic | ICD-10-CM

## 2025-01-29 RX ORDER — ZOLPIDEM TARTRATE 5 MG/1
5 TABLET ORAL NIGHTLY PRN
Qty: 30 TABLET | Refills: 0 | Status: SHIPPED | OUTPATIENT
Start: 2025-01-29

## 2025-01-30 LAB
CYTOLOGIST CVX/VAG CYTO: ABNORMAL
CYTOLOGY CVX/VAG DOC CYTO: ABNORMAL
CYTOLOGY CVX/VAG DOC THIN PREP: ABNORMAL
DX ICD CODE: ABNORMAL
DX ICD CODE: ABNORMAL
HPV I/H RISK 4 DNA CVX QL PROBE+SIG AMP: POSITIVE
HPV16 DNA CVX QL PROBE+SIG AMP: NEGATIVE
HPV18+45 E6+E7 MRNA CVX QL NAA+PROBE: NEGATIVE
Lab: ABNORMAL
OTHER STN SPEC: ABNORMAL
PATHOLOGIST CVX/VAG CYTO: ABNORMAL
STAT OF ADQ CVX/VAG CYTO-IMP: ABNORMAL

## 2025-01-31 ENCOUNTER — TELEPHONE (OUTPATIENT)
Dept: OBSTETRICS AND GYNECOLOGY | Facility: CLINIC | Age: 60
End: 2025-01-31
Payer: MEDICAID

## 2025-02-05 ENCOUNTER — PROCEDURE VISIT (OUTPATIENT)
Dept: OBSTETRICS AND GYNECOLOGY | Facility: CLINIC | Age: 60
End: 2025-02-05
Payer: MEDICAID

## 2025-02-05 VITALS
HEIGHT: 67 IN | BODY MASS INDEX: 26.53 KG/M2 | SYSTOLIC BLOOD PRESSURE: 123 MMHG | DIASTOLIC BLOOD PRESSURE: 76 MMHG | WEIGHT: 169 LBS

## 2025-02-05 DIAGNOSIS — R87.810 ASCUS WITH POSITIVE HIGH RISK HPV CERVICAL: Primary | ICD-10-CM

## 2025-02-05 DIAGNOSIS — Z98.890 STATUS POST COLPOSCOPY: ICD-10-CM

## 2025-02-05 DIAGNOSIS — R87.610 ASCUS WITH POSITIVE HIGH RISK HPV CERVICAL: Primary | ICD-10-CM

## 2025-02-05 NOTE — PROGRESS NOTES
Colposcopy Procedure Note    Indications:   Pap: ASCUS, other HR HPV positive on 1/27/25   She has had previous abnormal pap smears. She has not has a conization or LEEP procedure in the past.   Prior cervical procedures/treatments n/a     History:  Social History     Tobacco Use   Smoking Status Never   Smokeless Tobacco Never   Tobacco Comments    Caffeine - Yes     Contraception: postmenopausal status     Procedure Details   The risks and benefits of the procedure and Verbal informed consent obtained.    Speculum placed in vagina and excellent visualization of cervix achieved, cervix swabbed x 3 with acetic acid solution. There was inadequate visualization of the squamocolumnar junction.     Findings:  Cervix: no visible lesions; SCJ NOT visualized in entirety , endocervical curettage performed, and specimen labelled and sent to pathology.  Vaginal inspection: vaginal colposcopy not performed.  Vulvar colposcopy: vulvar colposcopy not performed.    Specimens: ECC    Complications: none.  Patient tolerated procedure well.    Impression:  No GREG     Plan:  Specimens labelled and sent to Pathology.  Will base further treatment on Pathology findings.  Post biopsy instructions given to patient.  Patient will be called with results.  Patient was instructed if she is not called in 10 days to call the office to discuss results and follow up.      Landy Horne MD

## 2025-02-07 ENCOUNTER — TELEPHONE (OUTPATIENT)
Dept: OBSTETRICS AND GYNECOLOGY | Facility: CLINIC | Age: 60
End: 2025-02-07
Payer: MEDICAID

## 2025-02-07 NOTE — TELEPHONE ENCOUNTER
Message sent to Dr. Horne:  [facility] called and said they received a specimen from 2/5/25. The lab req. Says ECC but the bottle says cervix. Will you confirm?? Thanks!   [phone]

## 2025-02-12 LAB
DX ICD CODE: NORMAL
DX ICD CODE: NORMAL
PATH REPORT.FINAL DX SPEC: NORMAL
PATH REPORT.GROSS SPEC: NORMAL
PATH REPORT.SITE OF ORIGIN SPEC: NORMAL
PATHOLOGIST NAME: NORMAL
PAYMENT PROCEDURE: NORMAL

## 2025-03-11 ENCOUNTER — TELEPHONE (OUTPATIENT)
Dept: ORTHOPEDIC SURGERY | Facility: CLINIC | Age: 60
End: 2025-03-11

## 2025-03-11 NOTE — TELEPHONE ENCOUNTER
Caller: Sakina Guerrier    Relationship: Self    Best call back number: [phone]    Requested Prescriptions:   cephalexin (KEFLEX) 500 MG capsule [9500] (Order [id number])     Pharmacy where request should be sent:    [facility] IN [place]    Last office visit with prescribing clinician: Visit date not found   Last telemedicine visit with prescribing clinician: Visit date not found   Next office visit with prescribing clinician: Visit date not found     Additional details provided by patient: PT HAVING DENTAL WORK DONE ON FRIDAY FOR A CLEANING AND NEEDS THE ANTIBITOIC FILLED TO DO SO    Does the patient have less than a 3 day supply:  [x] Yes  [] No    Would you like a call back once the refill request has been completed: [x] Yes [] No    If the office needs to give you a call back, can they leave a voicemail: [x] Yes [] No    Dain Solomon   03/11/25 14:30 EDT

## 2025-04-03 ENCOUNTER — OFFICE VISIT (OUTPATIENT)
Dept: INTERNAL MEDICINE | Facility: CLINIC | Age: 60
End: 2025-04-03
Payer: MEDICAID

## 2025-04-03 VITALS
WEIGHT: 170 LBS | SYSTOLIC BLOOD PRESSURE: 120 MMHG | HEIGHT: 67 IN | TEMPERATURE: 98.8 F | OXYGEN SATURATION: 98 % | DIASTOLIC BLOOD PRESSURE: 82 MMHG | HEART RATE: 88 BPM | BODY MASS INDEX: 26.68 KG/M2

## 2025-04-03 DIAGNOSIS — H65.02 NON-RECURRENT ACUTE SEROUS OTITIS MEDIA OF LEFT EAR: ICD-10-CM

## 2025-04-03 DIAGNOSIS — J06.9 ACUTE URI: Primary | ICD-10-CM

## 2025-04-03 LAB
EXPIRATION DATE: NORMAL
FLUAV AG UPPER RESP QL IA.RAPID: NOT DETECTED
FLUBV AG UPPER RESP QL IA.RAPID: NOT DETECTED
INTERNAL CONTROL: NORMAL
Lab: NORMAL
SARS-COV-2 AG UPPER RESP QL IA.RAPID: NOT DETECTED

## 2025-04-03 PROCEDURE — 1160F RVW MEDS BY RX/DR IN RCRD: CPT | Performed by: NURSE PRACTITIONER

## 2025-04-03 PROCEDURE — 1125F AMNT PAIN NOTED PAIN PRSNT: CPT | Performed by: NURSE PRACTITIONER

## 2025-04-03 PROCEDURE — 99213 OFFICE O/P EST LOW 20 MIN: CPT | Performed by: NURSE PRACTITIONER

## 2025-04-03 PROCEDURE — 87428 SARSCOV & INF VIR A&B AG IA: CPT | Performed by: NURSE PRACTITIONER

## 2025-04-03 PROCEDURE — 1159F MED LIST DOCD IN RCRD: CPT | Performed by: NURSE PRACTITIONER

## 2025-04-03 RX ORDER — AZITHROMYCIN 250 MG/1
TABLET, FILM COATED ORAL
Qty: 6 TABLET | Refills: 0 | Status: SHIPPED | OUTPATIENT
Start: 2025-04-03

## 2025-04-03 RX ORDER — METHYLPREDNISOLONE 4 MG/1
TABLET ORAL
Qty: 21 EACH | Refills: 0 | Status: SHIPPED | OUTPATIENT
Start: 2025-04-03

## 2025-04-03 RX ORDER — GUAIFENESIN 600 MG/1
1200 TABLET, EXTENDED RELEASE ORAL 2 TIMES DAILY
Qty: 40 TABLET | Refills: 0 | Status: SHIPPED | OUTPATIENT
Start: 2025-04-03 | End: 2025-04-13

## 2025-04-03 NOTE — PROGRESS NOTES
"Chief Complaint  Chest Congestion, Cough, and Earache (Left )    Subjective        Sakina Guerrier presents to [facility]  History of Present Illness  This is a 58 y/o female presenting to office for complaints of nasal congestion, productive cough, and left ear ache. Reports taking tylenol, stahist PRN. Reports she has had low grade fever. Denies any n/v/d.     Objective   Vital Signs:  /82 (BP Location: Left arm, Patient Position: Sitting, Cuff Size: Adult)   Pulse 88   Temp 98.8 °F (37.1 °C) (Oral)   Ht 170.2 cm (67.01\")   Wt 77.1 kg (170 lb)   SpO2 98%   BMI 26.62 kg/m²   Estimated body mass index is 26.62 kg/m² as calculated from the following:    Height as of this encounter: 170.2 cm (67.01\").    Weight as of this encounter: 77.1 kg (170 lb).            Physical Exam  Constitutional:       Appearance: Normal appearance.   HENT:      Head: Normocephalic and atraumatic.      Right Ear: Ear canal and external ear normal. A middle ear effusion is present.      Left Ear: Tenderness present. A middle ear effusion is present. Tympanic membrane is erythematous.      Nose: Congestion present.      Mouth/Throat:      Mouth: Mucous membranes are moist.      Pharynx: Oropharynx is clear.   Eyes:      Conjunctiva/sclera: Conjunctivae normal.      Pupils: Pupils are equal, round, and reactive to light.   Cardiovascular:      Pulses: Normal pulses.      Heart sounds: Normal heart sounds. No murmur heard.     No gallop.   Pulmonary:      Effort: Pulmonary effort is normal. No respiratory distress.      Breath sounds: Normal breath sounds. No wheezing or rales.   Skin:     General: Skin is warm and dry.      Capillary Refill: Capillary refill takes less than 2 seconds.   Neurological:      Mental Status: She is alert and oriented to person, place, and time. Mental status is at baseline.   Psychiatric:         Mood and Affect: Mood normal.         Thought Content: Thought content normal.  "        Judgment: Judgment normal.        Result Review :  The following data was reviewed by: RANDA Stanford on 04/03/2025:  Common labs          8/29/2024    15:59   Common Labs   Glucose 108    BUN 16    Creatinine 0.85    Sodium 137    Potassium 4.7    Chloride 103    Calcium 9.3    WBC 11.61    Hemoglobin 12.9    Hematocrit 39.8    Platelets 251      Tobacco Use: Low Risk  (2/19/2025)    Patient History     Smoking Tobacco Use: Never     Smokeless Tobacco Use: Never     Passive Exposure: Not on file     Social History     Substance and Sexual Activity   Alcohol Use Yes    Alcohol/week: 1.0 standard drink of alcohol    Types: 1 Drinks containing 0.5 oz of alcohol per week    Comment: rare     Family History   Problem Relation Age of Onset    Heart disease Father     Depression Father     Mental illness Father     Cancer Father     Heart attack Father     Heart disease Mother     Cancer Mother     Hypertension Mother     Depression Mother     Colon polyps Mother     Mental illness Mother     Anxiety disorder Mother     Arthritis Mother     Irritable bowel syndrome Mother     No Known Problems Son     No Known Problems Daughter     Colon cancer Maternal Grandfather     Breast cancer Maternal Aunt     Breast cancer Maternal Aunt     Malig Hyperthermia Neg Hx     Ovarian cancer Neg Hx     Uterine cancer Neg Hx    POCT SARS-CoV-2 Antigen JOAQUIM + Flu (04/03/2025 9:12 AM)             Assessment and Plan   Diagnoses and all orders for this visit:    1. Acute URI (Primary)  -     POCT SARS-CoV-2 Antigen JOAQUIM + Flu  -     azithromycin (Zithromax Z-Kip) 250 MG tablet; Take 2 tablets by mouth on day 1, then 1 tablet daily on days 2-5  Dispense: 6 tablet; Refill: 0  -     methylPREDNISolone (MEDROL) 4 MG dose pack; Take as directed on package instructions.  Dispense: 21 each; Refill: 0  -     guaiFENesin (Mucinex) 600 MG 12 hr tablet; Take 2 tablets by mouth 2 (Two) Times a Day for 10 days.  Dispense: 40 tablet; Refill:  0    2. Non-recurrent acute serous otitis media of left ear    Azithromycin as prescribed--take with food and water  Medrol pack as prescribed-- take with food and water  Recommend to take Mucinex 1200mg BID as needed for cough and congestion.  You can also try Flonase Allergy 2 sprays to each nostril once a day for congestion.  You can also try salt water nose spray for stuffy nose.  Take over-the-counter acetaminophen or ibuprofen as needed for pain or fever.  You can try throat lozenges or salt water gargles for any sore throat.   Your symptoms may take up to 10-14 days to fully subside.  Recommend symptom management support including warm fluids, honey if not diabetic, and cough drops.   Contact us if you have worsening symptoms such as high fever (102F), shortness of breath, inability to keep liquids and solids down, or other worsening symptoms. Contact us if your symptoms fail to improve after 14 days.          Follow Up   Return in about 3 months (around 7/3/2025) for Annual physical.  Patient was given instructions and counseling regarding her condition or for health maintenance advice. Please see specific information pulled into the AVS if appropriate.

## 2025-04-25 NOTE — PROGRESS NOTES
Ankle Follow Up      Patient: Sakina Guerrier    YOB: 1965 60 y.o. female    Chief Complaints: Foot hurts    History of Present Illness:Patient was seen on 10/31/2022 reporting a 6-month history of some increased pain and swelling in the left hindfoot more at the sinus tarsi than medial hindfoot.  She reported that she had always been somewhat flat-footed and felt that this may have progressed to some degree but not dramatically.  She had been doing some physical therapy as ordered by Dr. Luis for this and also therapy for her hip which she had replaced by   in May.      She also reported history of previous left fifth metatarsal fracture treated operatively and had subsequent hardware removal.  She also had bilateral third interdigital neuroma excisions all the surgeries were performed by podiatry.         She was felt to have some exacerbation of pes planus with possible posterior tib tendon dysfunction with sinus tarsi impingement and symptomatic accessory navicular.     She is instructed on heel cord stretching exercises and fitted with a PT TD brace and sent for MRI.     Patient was seen on 12/15/2022 reporting that she still has some pain in the ankle mostly in the sinus tarsi more so than any medially.     We reviewed her MRI and she was felt to have lateral impingement in the sinus tarsi with a sensory navicular with some posterior tib tendon dysfunction.  She did not desire any surgical treatment nor was any recommended at that time.  She was leaving in a few days to go to [place] for 3 months.  We decided to hold off on therapy or custom orthotics with her leaving soon and discussed other measures.  She is instructed on obtaining power step orthotics and fitted with a medial heel wedge and sinus tarsi was injected.  She was to been seen back 3 months thereafter     Patient was not seen back again until 8/14/2023.  She had returned from [place] in April.  She had been busy over  the summer.  She had been using the power step orthotics and heel wedge some but not really doing any heel cord stretching exercises.  She reported that the injection did help but was not long-lasting.     Although the injection had given her some relief she had recurrent exacerbation of symptoms and she denies any surgical treatment.  She instructed increase heel cord stretching exercises and continue with power step orthotics and medial heel wedge.  We discussed custom orthotics which she wanted to hold off on.  She was prescribed compounding cream and referred to see Augusto at [facility] for therapy.  She requested additional injection which I felt was reasonable in the left sinus tarsi was injected with steroid lidocaine mixture.     Patient was seen on 12/11/2023 stating that the size tarsi injection from 8/14/2023  helped quite a bit after 2 weeks.  She had had decreased swelling and bruising and improvement in pain.  However with increased activity about 2 weeks her she had some recurrence of symptoms of pain in the sinus tarsi but not as bad as it was before the previous injection.  She had been doing her heel cord stretching exercises and using compounding cream which she said helped.  She did do therapy through [facility] but not with Augusto at [facility].  She was continuing to do home exercises.  She had continued using power step orthotics with medial heel wedge.     We discussed treatment and she did not desire any surgical treatment at that time.  She had some exacerbation of previous symptoms but not to the degree that it had prior to her previous injection.  We discussed custom orthotics which she wanted to hold off on.  She is instructed continue with home therapy with power step orthotic and medial heel wedge and compounding cream and left sinus tarsi was injected.  At that time she was leaving to go to [place] for several months.     Patient seen on 4/22/2024 stating her ankle was still bothering  her.  She did have some relief from the sinus tarsi injection but had recurrent pain in the inferolateral aspect of her left hindfoot and the sinus tarsi really more so than any medially.  She she had continued with stretching and compounding cream as well as power step orthotic with medial heel wedge.     That time we discussed treatment and she was interested in pursuing surgical treatment.  Reviewed with her that would likely need to have a calcaneal displacement osteotomy and debridement of her posterior tib tendon accessory navicular with possible posterior tib tendon advancement and possible FDL tendon transfer and medial cuneiform osteotomy.  She did not want to do anything until sometime in July and was quite a bit to go through.  We discussed other opinions and felt it was a good idea to get an opinion from Dr. Patton prior to proceeding.     Patient was seen on 7/11/2024 and had seen Dr. Patton that morning.  And per patient's review he was in agreement with the proposed treatment plan and would have done the same thing with calcaneal displacement osteotomy and cotton osteotomy as well as posterior tib tendon debridement removal of accessory navicular and likely FDL tendon transfer.  Patient was having persistent pain in the sinus tarsi due to asymmetric lateral impingement likely from posterior tib tendon dysfunction although did not show gross abnormality on previous MRI.  She had been unimproved with conservative measures.     At that time we discussed proceeding with surgical treatment with calcaneal osteotomy with plate fixation as well as likely cotton osteotomy the medial cuneiform and debridement of the posterior tib tendon with removal of accessory navicular and possible FDL tendon transfer.  She was cleaning her standing of this and understood the postoperative course with associated risk benefits potential outcomes and complications.  We had planned on doing this sometime in September while she  was in town.     However she subsequently called and realized that her insurance did not cover physical therapy in [place] where she was planning to go for the winter for rehab and decision made to postpone the surgery until she was 21 back in [place] for an extended period of time.     Patient seen on 8/29/2024 with persistent symptoms of lateral impingement with posterior tib tendon dysfunction in the left hindfoot and midfoot.    We discussed proceeding with operative treatment with medial displacement calcaneal osteotomy with plate fixation as well as possible cotton osteotomy of the medial cuneiform and debridement of the posterior tib tendon with removal of accessory navicular and likely FDL tendon transfer to the navicular and although previous MRI had not shown clear posterior tibial tendon pathology was obviously dysfunctional.  She wanted to proceed with this sometime in January 2025.  In the interim she was advised to continue with accommodative shoes with good arch support and area of the sinus tarsi was injected with steroid lidocaine mixture as a temporizing measure.  She subsequently decided to wait until spring 2025 to have surgery.    Patient is seen back today reporting persistent pain mainly in the anterolateral aspect of the left hindfoot and the sinus tarsi with minimal pain medially.  She had been on her feet quite a bit recently at her shop in [place] getting ready for therapy.  She does have a knee scooter  HPI    ROS: ankle pain  Past Medical History:   Diagnosis Date    Abnormal Pap smear of cervix     Acne     Acquired hypothyroidism     Anxiety     Cancer 09/2007    VULVAR CARCINOMA IN SITU    Colon polyp 2017    Found last colonoscopy    Depression     Endometriosis     GERD (gastroesophageal reflux disease) 2021    Hammertoe of right foot 04/24/2015    4TH RIGHT TOE    Hemorrhoids     Hip arthrosis 9 months    HPV (human papilloma virus) infection     Influenza A 02/06/2018  "   Lateral epicondylitis of right elbow 07/26/2013    Left knee pain 10/19/2017    SAW DR. GHASSAN COLE    Left ovarian cyst 08/2006    FOLLICLE CYST    Lesion of left plantar nerve     Menopause     Neuroma of foot 5 years    Surgery to correct    Onychomycosis 03/18/2014    Primary insomnia     Rheumatic fever     AS A CHILD    Right hip pain     Syncope 02/07/2018    SEEN AT [facility]    Thrombosed hemorrhoids 09/26/2017    SAW DR. FAUZIA GIRON    Varicella     Ventricular tachycardia        Physical Exam:   Vitals:    04/28/25 0959   Temp: 97.5 °F (36.4 °C)   Weight: 77.2 kg (170 lb 3.2 oz)   Height: 170.2 cm (67\")   PainSc: 8      Well developed with normal mood.  On exam she has moderate tenderness palpation left sinus tarsi.  Hindfoot motion is supple and passively correctable beyond neutral with some residual forefoot varus.  She had minimal tenderness with mild bony prominence at the medial aspect of the hindfoot/midfoot in the posterior tib tendon that had excellent 5 out of 5 inversion strength.      Radiology: 3 views left foot and 3 views left ankle ordered evaluate pain and alignment reviewed and compared to previous x-rays.  Do not see any obvious change in alignment there is about 30% uncovering of the talar head.  The accessory navicular is not readily 1 this is projectional likely.  No malalignment of the cuneiforms there is diminished subtalar joint space with pes planus configuration.  Ankle x-rays do not show any obvious tilt of the talus within the mortise or gross malalignment nor fractures.  The accessory navicular is evident on the lateral view of the foot and ankle.      Assessment/Plan:  MRI films and report of the left hindfoot dated 11/29/2022 reviewed which showed narrowing of the tarsal sinus posteriorly with loss of fat signal in that area and adjacent mild marrow edema of the calcaneus     Plantar lateral talar cortex abuts the dorsal cortex of the anterior calcaneal process.  " Posterior tib tendon maintains normal signal and thickness with no tendon sheath effusion.  There is a 6 mm accessory ossicle within the substance of the distal posterior tib tendon.  Other ankle ligaments and tendons appeared intact at that articular cartilage.        Assessment/Plan:  Left pes planus with sinus tarsi impingement  2.  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  We discussed treatment going forward and we will plan to proceed with operative treatment.  Reviewed plan on calcaneal osteotomy and would likely try to just remove the accessory navicular and advance the posterior tib tendon rather than do any if EDL tendon transfer unless absolutely necessary with which she was in agreement as she has excellent motor strength of the posterior tib tendon and this also leaves us salvage procedures if has recurrent problems.  Also may need a medial cuneiform plantarflexion osteotomy and reviewed etiology and mechanics of that with her.    She voiced clear understanding the operative procedure and treatment algorithm and postoperative course and instructions.  She will be nonweightbearing for at least 6 weeks and may not be full weightbearing for 10 to 12 weeks including in a boot and will require extensive therapy.  She already has a scooter and will practice with that.    She voiced clear understanding the operative procedure and postoperative course with associated risk benefits potential outcomes and complications which can include but are not limited to heart attack stroke death pneumonia infection bleeding damage to blood vessels nerves or tendons blood clots pulmonary embolism persistent or worsening pain stiffness instability malunion nonunion tendon rupture fracture need for subsequent surgery and failure to return to presurgery and precondition levels of activity as well as wound healing complications could require long-term wound care plastics reconstruction or even  catastrophic complications resulting in loss of the limb.    All of her questions were answered to her full satisfaction we will plan to proceed with this on outpatient basis on 5/12/2025.  She was encouraged to call if she has any questions prior to surgery.

## 2025-04-28 ENCOUNTER — PREP FOR SURGERY (OUTPATIENT)
Dept: OTHER | Facility: HOSPITAL | Age: 60
End: 2025-04-28
Payer: MEDICAID

## 2025-04-28 ENCOUNTER — OFFICE VISIT (OUTPATIENT)
Dept: ORTHOPEDIC SURGERY | Facility: CLINIC | Age: 60
End: 2025-04-28
Payer: MEDICAID

## 2025-04-28 ENCOUNTER — PRE-ADMISSION TESTING (OUTPATIENT)
Dept: PREADMISSION TESTING | Facility: HOSPITAL | Age: 60
End: 2025-04-28
Payer: MEDICAID

## 2025-04-28 VITALS
DIASTOLIC BLOOD PRESSURE: 71 MMHG | TEMPERATURE: 97.7 F | HEART RATE: 84 BPM | RESPIRATION RATE: 16 BRPM | OXYGEN SATURATION: 99 % | BODY MASS INDEX: 26.37 KG/M2 | SYSTOLIC BLOOD PRESSURE: 113 MMHG | WEIGHT: 168 LBS | HEIGHT: 67 IN

## 2025-04-28 VITALS — WEIGHT: 170.2 LBS | TEMPERATURE: 97.5 F | HEIGHT: 67 IN | BODY MASS INDEX: 26.71 KG/M2

## 2025-04-28 DIAGNOSIS — M79.672 PAIN ASSOCIATED WITH ACCESSORY NAVICULAR BONE OF FOOT, LEFT: ICD-10-CM

## 2025-04-28 DIAGNOSIS — Q74.2 PAIN ASSOCIATED WITH ACCESSORY NAVICULAR BONE OF FOOT, LEFT: ICD-10-CM

## 2025-04-28 DIAGNOSIS — M21.40 PES PLANUS, UNSPECIFIED LATERALITY: ICD-10-CM

## 2025-04-28 DIAGNOSIS — M76.829 POSTERIOR TIBIAL TENDON DYSFUNCTION: Primary | ICD-10-CM

## 2025-04-28 DIAGNOSIS — M25.572 SINUS TARSITIS OF LEFT FOOT: ICD-10-CM

## 2025-04-28 DIAGNOSIS — R52 PAIN: ICD-10-CM

## 2025-04-28 DIAGNOSIS — M25.872 IMPINGEMENT SYNDROME OF LEFT ANKLE: ICD-10-CM

## 2025-04-28 LAB
ANION GAP SERPL CALCULATED.3IONS-SCNC: 10.6 MMOL/L (ref 5–15)
BUN SERPL-MCNC: 18 MG/DL (ref 6–20)
BUN/CREAT SERPL: 22.5 (ref 7–25)
CALCIUM SPEC-SCNC: 8.8 MG/DL (ref 8.6–10.5)
CHLORIDE SERPL-SCNC: 104 MMOL/L (ref 98–107)
CO2 SERPL-SCNC: 22.4 MMOL/L (ref 22–29)
CREAT SERPL-MCNC: 0.8 MG/DL (ref 0.57–1)
DEPRECATED RDW RBC AUTO: 41 FL (ref 37–54)
EGFRCR SERPLBLD CKD-EPI 2021: 85 ML/MIN/1.73
ERYTHROCYTE [DISTWIDTH] IN BLOOD BY AUTOMATED COUNT: 12.7 % (ref 12.3–15.4)
GLUCOSE SERPL-MCNC: 105 MG/DL (ref 65–99)
HCT VFR BLD AUTO: 38.5 % (ref 34–46.6)
HGB BLD-MCNC: 12.7 G/DL (ref 12–15.9)
MCH RBC QN AUTO: 28.9 PG (ref 26.6–33)
MCHC RBC AUTO-ENTMCNC: 33 G/DL (ref 31.5–35.7)
MCV RBC AUTO: 87.7 FL (ref 79–97)
PLATELET # BLD AUTO: 219 10*3/MM3 (ref 140–450)
PMV BLD AUTO: 10.5 FL (ref 6–12)
POTASSIUM SERPL-SCNC: 4 MMOL/L (ref 3.5–5.2)
QT INTERVAL: 400 MS
QTC INTERVAL: 446 MS
RBC # BLD AUTO: 4.39 10*6/MM3 (ref 3.77–5.28)
SODIUM SERPL-SCNC: 137 MMOL/L (ref 136–145)
WBC NRBC COR # BLD AUTO: 6.42 10*3/MM3 (ref 3.4–10.8)

## 2025-04-28 PROCEDURE — 93010 ELECTROCARDIOGRAM REPORT: CPT | Performed by: INTERNAL MEDICINE

## 2025-04-28 PROCEDURE — 99214 OFFICE O/P EST MOD 30 MIN: CPT | Performed by: ORTHOPAEDIC SURGERY

## 2025-04-28 PROCEDURE — 80048 BASIC METABOLIC PNL TOTAL CA: CPT

## 2025-04-28 PROCEDURE — 73630 X-RAY EXAM OF FOOT: CPT | Performed by: ORTHOPAEDIC SURGERY

## 2025-04-28 PROCEDURE — 73610 X-RAY EXAM OF ANKLE: CPT | Performed by: ORTHOPAEDIC SURGERY

## 2025-04-28 PROCEDURE — 36415 COLL VENOUS BLD VENIPUNCTURE: CPT

## 2025-04-28 PROCEDURE — 85027 COMPLETE CBC AUTOMATED: CPT

## 2025-04-28 PROCEDURE — 93005 ELECTROCARDIOGRAM TRACING: CPT

## 2025-04-28 NOTE — DISCHARGE INSTRUCTIONS
Take the following medications the morning of surgery: PAROXETINE, ATENOLOL, OMEPRAZOLE      If you are on prescription narcotic pain medication to control your pain you may also take that medication the morning of surgery.      General Instructions:     Do not eat solid food after midnight the night before surgery.  Clear liquids day of surgery are allowed but must be stopped at least two hours before your hospital arrival time.       Allowed clear liquids      Water, sodas, and tea or coffee with no cream or milk added.       12 to 20 ounces of a clear liquid that contains carbohydrates is recommended.  If non-diabetic, have Gatorade or Powerade.  If diabetic, have G2 or Powerade Zero.     Do not have liquids red in color.  Do not consume chicken, beef, pork or vegetable broth or bouillon cubes of any variety as they are not considered clear liquids and are not allowed.      Infants may have breast milk up to four hours before surgery.  Infants drinking formula may drink formula up to six hours before surgery.   Patients who avoid smoking, chewing tobacco and alcohol for 4 weeks prior to surgery have a reduced risk of post-operative complications.  Quit smoking as many days before surgery as you can.  Do not smoke, use chewing tobacco or drink alcohol the day of surgery.   If applicable bring your C-PAP/ BI-PAP machine in with you to preop day of surgery.  Bring any papers given to you in the doctor’s office.  Wear clean comfortable clothes.  Do not wear contact lenses, false eyelashes or make-up.  Bring a case for your glasses.   Bring crutches or walker if applicable.  Remove all piercings.  Leave jewelry and any other valuables at home.  Hair extensions with metal clips must be removed prior to surgery.  The Pre-Admission Testing nurse will instruct you to bring medications if unable to obtain an accurate list in Pre-Admission Testing.    Day of surgery you will need to let the preoperative nurse know the last  time you took each of your medications.  To ensure a safe environment for patients and staff, we kindly ask that children under the age of 16 not accompany patients.  If you must bring a dependent child or dependent adult please ensure a responsible adult, other than yourself, is present to supervise them.      If you were given a blood bank ID arm band remember to bring it with you the day of surgery.    Preventing a Surgical Site Infection:  For 2 to 3 days before surgery, avoid shaving with a razor because the razor can irritate skin and make it easier to develop an infection.    Any areas of open skin can increase the risk of a post-operative wound infection by allowing bacteria to enter and travel throughout the body.  Notify your surgeon if you have any skin wounds / rashes even if it is not near the expected surgical site.  The area will need assessed to determine if surgery should be delayed until it is healed.  The night prior to surgery shower using a fresh bar of anti-bacterial soap (such as Dial) and clean washcloth.  Sleep in a clean bed with clean clothing.  Do not allow pets to sleep with you.  Shower on the morning of surgery using a fresh bar of anti-bacterial soap (such as Dial) and clean washcloth.  Dry with a clean towel and dress in clean clothing.  Ask your surgeon if you will be receiving antibiotics prior to surgery.  Make sure you, your family, and all healthcare providers clean their hands with soap and water or an alcohol based hand  before caring for you or your wound.    Day of surgery:  Your arrival time is approximately two hours before your scheduled surgery time.  Please note if you have an early arrival time the surgery doors do not open before 5:00 AM.  Upon arrival, a Pre-op nurse and Anesthesiologist will review your health history, obtain vital signs, and answer questions you may have.  The only belongings needed at this time will be a list of your home medications and  if applicable your C-PAP/BI-PAP machine.  A Pre-op nurse will start an IV and you may receive medication in preparation for surgery, including something to help you relax.     Please be aware that surgery does come with discomfort.  We want to make every effort to control your discomfort so please discuss any uncontrolled symptoms with your nurse.   Your doctor will most likely have prescribed pain medications.      If you are going home after surgery you will receive individualized written care instructions before being discharged.  A responsible adult must drive you to and from the hospital on the day of your surgery and ideally stay with you through the night.   .  Discharge prescriptions can be filled by the hospital pharmacy during regular pharmacy hours.  If you are having surgery late in the day/evening your prescription may be e-prescribed to your pharmacy.  Please verify your pharmacy hours or chose a 24 hour pharmacy to avoid not having access to your prescription because your pharmacy has closed for the day.    If you are staying overnight following surgery, you will be transported to your hospital room following the recovery period.  [facility] has all private rooms.    If you have any questions please call Pre-Admission Testing at [phone].  Deductibles and co-payments are collected on the day of service. Please be prepared to pay the required co-pay, deductible or deposit on the day of service as defined by your plan.    Call your surgeon immediately if you experience any of the following symptoms:  Sore Throat  Shortness of Breath or difficulty breathing  Cough  Chills  Body soreness or muscle pain  Headache  Fever  New loss of taste or smell  Do not arrive for your surgery ill.  Your procedure will need to be rescheduled to another time.  You will need to call your physician before the day of surgery to avoid any unnecessary exposure to hospital staff as well as other patients.

## 2025-05-12 NOTE — H&P
Patient: Sakina Guerrier     YOB: 1965 60 y.o. female     Chief Complaints: Foot hurts     History of Present Illness:Patient was seen on 10/31/2022 reporting a 6-month history of some increased pain and swelling in the left hindfoot more at the sinus tarsi than medial hindfoot.  She reported that she had always been somewhat flat-footed and felt that this may have progressed to some degree but not dramatically.  She had been doing some physical therapy as ordered by Dr. Luis for this and also therapy for her hip which she had replaced by   in May.      She also reported history of previous left fifth metatarsal fracture treated operatively and had subsequent hardware removal.  She also had bilateral third interdigital neuroma excisions all the surgeries were performed by podiatry.         She was felt to have some exacerbation of pes planus with possible posterior tib tendon dysfunction with sinus tarsi impingement and symptomatic accessory navicular.     She is instructed on heel cord stretching exercises and fitted with a PT TD brace and sent for MRI.     Patient was seen on 12/15/2022 reporting that she still has some pain in the ankle mostly in the sinus tarsi more so than any medially.     We reviewed her MRI and she was felt to have lateral impingement in the sinus tarsi with a sensory navicular with some posterior tib tendon dysfunction.  She did not desire any surgical treatment nor was any recommended at that time.  She was leaving in a few days to go to [place] for 3 months.  We decided to hold off on therapy or custom orthotics with her leaving soon and discussed other measures.  She is instructed on obtaining power step orthotics and fitted with a medial heel wedge and sinus tarsi was injected.  She was to been seen back 3 months thereafter     Patient was not seen back again until 8/14/2023.  She had returned from [place] in April.  She had been busy over the summer.  She had  been using the power step orthotics and heel wedge some but not really doing any heel cord stretching exercises.  She reported that the injection did help but was not long-lasting.     Although the injection had given her some relief she had recurrent exacerbation of symptoms and she denies any surgical treatment.  She instructed increase heel cord stretching exercises and continue with power step orthotics and medial heel wedge.  We discussed custom orthotics which she wanted to hold off on.  She was prescribed compounding cream and referred to see Augusto at [facility] for therapy.  She requested additional injection which I felt was reasonable in the left sinus tarsi was injected with steroid lidocaine mixture.     Patient was seen on 12/11/2023 stating that the size tarsi injection from 8/14/2023  helped quite a bit after 2 weeks.  She had had decreased swelling and bruising and improvement in pain.  However with increased activity about 2 weeks her she had some recurrence of symptoms of pain in the sinus tarsi but not as bad as it was before the previous injection.  She had been doing her heel cord stretching exercises and using compounding cream which she said helped.  She did do therapy through [facility] but not with [facility].  She was continuing to do home exercises.  She had continued using power step orthotics with medial heel wedge.     We discussed treatment and she did not desire any surgical treatment at that time.  She had some exacerbation of previous symptoms but not to the degree that it had prior to her previous injection.  We discussed custom orthotics which she wanted to hold off on.  She is instructed continue with home therapy with power step orthotic and medial heel wedge and compounding cream and left sinus tarsi was injected.  At that time she was leaving to go to [place] for several months.     Patient seen on 4/22/2024 stating her ankle was still bothering her.  She did have  some relief from the sinus tarsi injection but had recurrent pain in the inferolateral aspect of her left hindfoot and the sinus tarsi really more so than any medially.  She she had continued with stretching and compounding cream as well as power step orthotic with medial heel wedge.     That time we discussed treatment and she was interested in pursuing surgical treatment.  Reviewed with her that would likely need to have a calcaneal displacement osteotomy and debridement of her posterior tib tendon accessory navicular with possible posterior tib tendon advancement and possible FDL tendon transfer and medial cuneiform osteotomy.  She did not want to do anything until sometime in July and was quite a bit to go through.  We discussed other opinions and felt it was a good idea to get an opinion from Dr. Patton prior to proceeding.     Patient was seen on 7/11/2024 and had seen Dr. Patton that morning.  And per patient's review he was in agreement with the proposed treatment plan and would have done the same thing with calcaneal displacement osteotomy and cotton osteotomy as well as posterior tib tendon debridement removal of accessory navicular and likely FDL tendon transfer.  Patient was having persistent pain in the sinus tarsi due to asymmetric lateral impingement likely from posterior tib tendon dysfunction although did not show gross abnormality on previous MRI.  She had been unimproved with conservative measures.     At that time we discussed proceeding with surgical treatment with calcaneal osteotomy with plate fixation as well as likely cotton osteotomy the medial cuneiform and debridement of the posterior tib tendon with removal of accessory navicular and possible FDL tendon transfer.  She was cleaning her standing of this and understood the postoperative course with associated risk benefits potential outcomes and complications.  We had planned on doing this sometime in September while she was in town.      However she subsequently called and realized that her insurance did not cover physical therapy in [place] where she was planning to go for the winter for rehab and decision made to postpone the surgery until she was 21 back in [place] for an extended period of time.     Patient seen on 8/29/2024 with persistent symptoms of lateral impingement with posterior tib tendon dysfunction in the left hindfoot and midfoot.     We discussed proceeding with operative treatment with medial displacement calcaneal osteotomy with plate fixation as well as possible cotton osteotomy of the medial cuneiform and debridement of the posterior tib tendon with removal of accessory navicular and likely FDL tendon transfer to the navicular and although previous MRI had not shown clear posterior tibial tendon pathology was obviously dysfunctional.  She wanted to proceed with this sometime in January 2025.  In the interim she was advised to continue with accommodative shoes with good arch support and area of the sinus tarsi was injected with steroid lidocaine mixture as a temporizing measure.  She subsequently decided to wait until spring 2025 to have surgery.     Patient was seen on 4/28/2025 reporting persistent pain mainly in the anterolateral aspect of the left hindfoot and the sinus tarsi with minimal pain medially.  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  She did have a knee scooter  HPI     ROS: ankle pain  Medical History        Past Medical History:   Diagnosis Date    Abnormal Pap smear of cervix      Acne      Acquired hypothyroidism      Anxiety      Cancer 09/2007     VULVAR CARCINOMA IN SITU    Colon polyp 2017     Found last colonoscopy    Depression      Endometriosis      GERD (gastroesophageal reflux disease) 2021    Hammertoe of right foot 04/24/2015     4TH RIGHT TOE    Hemorrhoids      Hip arthrosis 9 months    HPV (human papilloma virus) infection       "Influenza A 02/06/2018    Lateral epicondylitis of right elbow 07/26/2013    Left knee pain 10/19/2017     SAW DR. GHASSAN COLE    Left ovarian cyst 08/2006     FOLLICLE CYST    Lesion of left plantar nerve      Menopause      Neuroma of foot 5 years     Surgery to correct    Onychomycosis 03/18/2014    Primary insomnia      Rheumatic fever       AS A CHILD    Right hip pain      Syncope 02/07/2018     SEEN AT [facility]    Thrombosed hemorrhoids 09/26/2017     SAW DR. FAUZIA GIRON    Varicella      Ventricular tachycardia              Physical Exam:   Vitals       Vitals:     04/28/25 0959   Temp: 97.5 °F (36.4 °C)   Weight: 77.2 kg (170 lb 3.2 oz)   Height: 170.2 cm (67\")   PainSc: 8       Performed 4/28/2025  Well developed with normal mood.  On exam she has moderate tenderness palpation left sinus tarsi.  Hindfoot motion is supple and passively correctable beyond neutral with some residual forefoot varus.  She had minimal tenderness with mild bony prominence at the medial aspect of the hindfoot/midfoot in the posterior tib tendon that had excellent 5 out of 5 inversion strength.        Radiology: 3 views left foot and 3 views left ankle ordered evaluate pain and alignment reviewed and compared to previous x-rays.  Do not see any obvious change in alignment there is about 30% uncovering of the talar head.  The accessory navicular is not readily 1 this is projectional likely.  No malalignment of the cuneiforms there is diminished subtalar joint space with pes planus configuration.  Ankle x-rays do not show any obvious tilt of the talus within the mortise or gross malalignment nor fractures.  The accessory navicular is evident on the lateral view of the foot and ankle.        Assessment/Plan:  MRI films and report of the left hindfoot dated 11/29/2022 reviewed which showed narrowing of the tarsal sinus posteriorly with loss of fat signal in that area and adjacent mild marrow edema of the calcaneus     Plantar " lateral talar cortex abuts the dorsal cortex of the anterior calcaneal process.  Posterior tib tendon maintains normal signal and thickness with no tendon sheath effusion.  There is a 6 mm accessory ossicle within the substance of the distal posterior tib tendon.  Other ankle ligaments and tendons appeared intact at that articular cartilage.        Assessment/Plan:  Left pes planus with sinus tarsi impingement  2.  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        On 4/28/2025 we discussed treatment going forward and reviewed that we would plan to proceed with operative treatment.  Reviewed plan on calcaneal osteotomy and would likely try to just remove the accessory navicular and advance the posterior tib tendon rather than do any if FDL tendon transfer unless absolutely necessary with which she was in agreement as she has excellent motor strength of the posterior tib tendon and this also would leave us salvage procedures options if has recurrent problems.  Also may need a medial cuneiform plantarflexion osteotomy and reviewed etiology and mechanics of that with her.     She voiced clear understanding the operative procedure and treatment algorithm and postoperative course and instructions.  She will be nonweightbearing for at least 6 weeks and may not be full weightbearing for 10 to 12 weeks including in a boot and will require extensive therapy.  She already had had a scooter and will practice with that.     She voiced clear understanding the operative procedure and postoperative course with associated risk benefits potential outcomes and complications which can include but are not limited to heart attack stroke death pneumonia infection bleeding damage to blood vessels nerves or tendons blood clots pulmonary embolism persistent or worsening pain stiffness instability malunion nonunion tendon rupture fracture need for subsequent surgery and failure to return to presurgery and  precondition levels of activity as well as wound healing complications could require long-term wound care plastics reconstruction or even catastrophic complications resulting in loss of the limb.     All of her questions were answered to her full satisfaction.  Reviewed that we would plan to proceed with this on outpatient basis on 5/13/2025.  She was encouraged to call if she had any questions prior to surgery.     Copied text in this note has been reviewed by me and is accurate as of  05/12/25 .

## 2025-05-13 ENCOUNTER — ANESTHESIA EVENT (OUTPATIENT)
Dept: PERIOP | Facility: HOSPITAL | Age: 60
End: 2025-05-13
Payer: MEDICAID

## 2025-05-13 ENCOUNTER — APPOINTMENT (OUTPATIENT)
Dept: GENERAL RADIOLOGY | Facility: HOSPITAL | Age: 60
End: 2025-05-13
Payer: MEDICAID

## 2025-05-13 ENCOUNTER — HOSPITAL ENCOUNTER (OUTPATIENT)
Facility: HOSPITAL | Age: 60
Setting detail: HOSPITAL OUTPATIENT SURGERY
Discharge: HOME | End: 2025-05-13
Attending: ORTHOPAEDIC SURGERY | Admitting: ORTHOPAEDIC SURGERY
Payer: MEDICAID

## 2025-05-13 ENCOUNTER — ANESTHESIA (OUTPATIENT)
Dept: PERIOP | Facility: HOSPITAL | Age: 60
End: 2025-05-13
Payer: MEDICAID

## 2025-05-13 VITALS
DIASTOLIC BLOOD PRESSURE: 74 MMHG | OXYGEN SATURATION: 95 % | RESPIRATION RATE: 18 BRPM | SYSTOLIC BLOOD PRESSURE: 109 MMHG | HEART RATE: 70 BPM | TEMPERATURE: 97.6 F

## 2025-05-13 DIAGNOSIS — M79.672 PAIN ASSOCIATED WITH ACCESSORY NAVICULAR BONE OF FOOT, LEFT: ICD-10-CM

## 2025-05-13 DIAGNOSIS — M21.42 PES PLANUS OF LEFT FOOT: ICD-10-CM

## 2025-05-13 DIAGNOSIS — M25.572 SINUS TARSITIS OF LEFT FOOT: ICD-10-CM

## 2025-05-13 DIAGNOSIS — M25.872 IMPINGEMENT SYNDROME OF LEFT ANKLE: ICD-10-CM

## 2025-05-13 DIAGNOSIS — M76.829 POSTERIOR TIBIAL TENDON DYSFUNCTION: ICD-10-CM

## 2025-05-13 DIAGNOSIS — Q74.2 PAIN ASSOCIATED WITH ACCESSORY NAVICULAR BONE OF FOOT, LEFT: ICD-10-CM

## 2025-05-13 PROCEDURE — 25010000002 SUGAMMADEX 200 MG/2ML SOLUTION: Performed by: NURSE ANESTHETIST, CERTIFIED REGISTERED

## 2025-05-13 PROCEDURE — 25810000003 LACTATED RINGERS PER 1000 ML: Performed by: ANESTHESIOLOGY

## 2025-05-13 PROCEDURE — 25010000002 DEXAMETHASONE PER 1 MG: Performed by: STUDENT IN AN ORGANIZED HEALTH CARE EDUCATION/TRAINING PROGRAM

## 2025-05-13 PROCEDURE — 25010000002 FAMOTIDINE 10 MG/ML SOLUTION: Performed by: ANESTHESIOLOGY

## 2025-05-13 PROCEDURE — 25010000002 FENTANYL CITRATE (PF) 50 MCG/ML SOLUTION: Performed by: ANESTHESIOLOGY

## 2025-05-13 PROCEDURE — C1713 ANCHOR/SCREW BN/BN,TIS/BN: HCPCS | Performed by: ORTHOPAEDIC SURGERY

## 2025-05-13 PROCEDURE — 25010000002 ROPIVACAINE PER 1 MG: Performed by: STUDENT IN AN ORGANIZED HEALTH CARE EDUCATION/TRAINING PROGRAM

## 2025-05-13 PROCEDURE — 28300 INCISION OF HEEL BONE: CPT | Performed by: ORTHOPAEDIC SURGERY

## 2025-05-13 PROCEDURE — 25010000002 ONDANSETRON PER 1 MG: Performed by: NURSE ANESTHETIST, CERTIFIED REGISTERED

## 2025-05-13 PROCEDURE — 28304 INCISION OF MIDFOOT BONES: CPT | Performed by: ORTHOPAEDIC SURGERY

## 2025-05-13 PROCEDURE — 25010000002 LIDOCAINE PF 2% 2 % SOLUTION: Performed by: NURSE ANESTHETIST, CERTIFIED REGISTERED

## 2025-05-13 PROCEDURE — 25010000002 CEFAZOLIN PER 500 MG: Performed by: ORTHOPAEDIC SURGERY

## 2025-05-13 PROCEDURE — 76000 FLUOROSCOPY <1 HR PHYS/QHP: CPT

## 2025-05-13 PROCEDURE — 25010000002 PROPOFOL 10 MG/ML EMULSION: Performed by: NURSE ANESTHETIST, CERTIFIED REGISTERED

## 2025-05-13 PROCEDURE — 25010000002 MIDAZOLAM PER 1 MG: Performed by: ANESTHESIOLOGY

## 2025-05-13 PROCEDURE — 28238 REVISION OF FOOT TENDON: CPT | Performed by: ORTHOPAEDIC SURGERY

## 2025-05-13 PROCEDURE — 25010000002 DEXAMETHASONE SODIUM PHOSPHATE 20 MG/5ML SOLUTION: Performed by: NURSE ANESTHETIST, CERTIFIED REGISTERED

## 2025-05-13 RX ORDER — HYDROMORPHONE HYDROCHLORIDE 1 MG/ML
0.5 INJECTION, SOLUTION INTRAMUSCULAR; INTRAVENOUS; SUBCUTANEOUS
Status: DISCONTINUED | OUTPATIENT
Start: 2025-05-13 | End: 2025-05-13 | Stop reason: HOSPADM

## 2025-05-13 RX ORDER — MIDAZOLAM HYDROCHLORIDE 1 MG/ML
1 INJECTION, SOLUTION INTRAMUSCULAR; INTRAVENOUS
Status: DISCONTINUED | OUTPATIENT
Start: 2025-05-13 | End: 2025-05-13 | Stop reason: HOSPADM

## 2025-05-13 RX ORDER — DROPERIDOL 2.5 MG/ML
0.62 INJECTION, SOLUTION INTRAMUSCULAR; INTRAVENOUS
Status: DISCONTINUED | OUTPATIENT
Start: 2025-05-13 | End: 2025-05-13 | Stop reason: HOSPADM

## 2025-05-13 RX ORDER — LIDOCAINE HYDROCHLORIDE 20 MG/ML
INJECTION, SOLUTION EPIDURAL; INFILTRATION; INTRACAUDAL; PERINEURAL AS NEEDED
Status: DISCONTINUED | OUTPATIENT
Start: 2025-05-13 | End: 2025-05-13 | Stop reason: SURG

## 2025-05-13 RX ORDER — SODIUM CHLORIDE 0.9 % (FLUSH) 0.9 %
3 SYRINGE (ML) INJECTION EVERY 12 HOURS SCHEDULED
Status: DISCONTINUED | OUTPATIENT
Start: 2025-05-13 | End: 2025-05-13 | Stop reason: HOSPADM

## 2025-05-13 RX ORDER — DIPHENHYDRAMINE HYDROCHLORIDE 50 MG/ML
12.5 INJECTION, SOLUTION INTRAMUSCULAR; INTRAVENOUS
Status: DISCONTINUED | OUTPATIENT
Start: 2025-05-13 | End: 2025-05-13 | Stop reason: HOSPADM

## 2025-05-13 RX ORDER — BUPIVACAINE HYDROCHLORIDE AND EPINEPHRINE 2.5; 5 MG/ML; UG/ML
INJECTION, SOLUTION EPIDURAL; INFILTRATION; INTRACAUDAL; PERINEURAL
Status: COMPLETED | OUTPATIENT
Start: 2025-05-13 | End: 2025-05-13

## 2025-05-13 RX ORDER — OXYCODONE AND ACETAMINOPHEN 5; 325 MG/1; MG/1
1-2 TABLET ORAL EVERY 4 HOURS PRN
Qty: 42 TABLET | Refills: 0 | Status: SHIPPED | OUTPATIENT
Start: 2025-05-13 | End: 2025-05-19 | Stop reason: SDUPTHER

## 2025-05-13 RX ORDER — PROMETHAZINE HYDROCHLORIDE 25 MG/1
25 SUPPOSITORY RECTAL ONCE AS NEEDED
Status: DISCONTINUED | OUTPATIENT
Start: 2025-05-13 | End: 2025-05-13 | Stop reason: HOSPADM

## 2025-05-13 RX ORDER — FAMOTIDINE 10 MG/ML
20 INJECTION, SOLUTION INTRAVENOUS ONCE
Status: COMPLETED | OUTPATIENT
Start: 2025-05-13 | End: 2025-05-13

## 2025-05-13 RX ORDER — OXYCODONE AND ACETAMINOPHEN 5; 325 MG/1; MG/1
1 TABLET ORAL ONCE AS NEEDED
Refills: 0 | Status: DISCONTINUED | OUTPATIENT
Start: 2025-05-13 | End: 2025-05-13 | Stop reason: HOSPADM

## 2025-05-13 RX ORDER — IPRATROPIUM BROMIDE AND ALBUTEROL SULFATE 2.5; .5 MG/3ML; MG/3ML
3 SOLUTION RESPIRATORY (INHALATION) ONCE AS NEEDED
Status: DISCONTINUED | OUTPATIENT
Start: 2025-05-13 | End: 2025-05-13 | Stop reason: HOSPADM

## 2025-05-13 RX ORDER — CEPHALEXIN 500 MG/1
500 CAPSULE ORAL EVERY 6 HOURS
Qty: 8 CAPSULE | Refills: 0 | Status: SHIPPED | OUTPATIENT
Start: 2025-05-13 | End: 2025-05-15

## 2025-05-13 RX ORDER — ONDANSETRON 2 MG/ML
4 INJECTION INTRAMUSCULAR; INTRAVENOUS ONCE AS NEEDED
Status: DISCONTINUED | OUTPATIENT
Start: 2025-05-13 | End: 2025-05-13 | Stop reason: HOSPADM

## 2025-05-13 RX ORDER — HYDRALAZINE HYDROCHLORIDE 20 MG/ML
5 INJECTION INTRAMUSCULAR; INTRAVENOUS
Status: DISCONTINUED | OUTPATIENT
Start: 2025-05-13 | End: 2025-05-13 | Stop reason: HOSPADM

## 2025-05-13 RX ORDER — FLUMAZENIL 0.1 MG/ML
0.2 INJECTION INTRAVENOUS AS NEEDED
Status: DISCONTINUED | OUTPATIENT
Start: 2025-05-13 | End: 2025-05-13 | Stop reason: HOSPADM

## 2025-05-13 RX ORDER — HYDROCODONE BITARTRATE AND ACETAMINOPHEN 5; 325 MG/1; MG/1
1 TABLET ORAL ONCE AS NEEDED
Status: DISCONTINUED | OUTPATIENT
Start: 2025-05-13 | End: 2025-05-13 | Stop reason: HOSPADM

## 2025-05-13 RX ORDER — PROPARACAINE HYDROCHLORIDE 5 MG/ML
1 SOLUTION/ DROPS OPHTHALMIC ONCE
Status: COMPLETED | OUTPATIENT
Start: 2025-05-13 | End: 2025-05-13

## 2025-05-13 RX ORDER — NALOXONE HCL 0.4 MG/ML
0.2 VIAL (ML) INJECTION AS NEEDED
Status: DISCONTINUED | OUTPATIENT
Start: 2025-05-13 | End: 2025-05-13 | Stop reason: HOSPADM

## 2025-05-13 RX ORDER — FENTANYL CITRATE 50 UG/ML
50 INJECTION, SOLUTION INTRAMUSCULAR; INTRAVENOUS
Status: DISCONTINUED | OUTPATIENT
Start: 2025-05-13 | End: 2025-05-13 | Stop reason: HOSPADM

## 2025-05-13 RX ORDER — LIDOCAINE HYDROCHLORIDE 10 MG/ML
0.5 INJECTION, SOLUTION INFILTRATION; PERINEURAL ONCE AS NEEDED
Status: DISCONTINUED | OUTPATIENT
Start: 2025-05-13 | End: 2025-05-13 | Stop reason: HOSPADM

## 2025-05-13 RX ORDER — ROPIVACAINE HYDROCHLORIDE 5 MG/ML
INJECTION, SOLUTION EPIDURAL; INFILTRATION; PERINEURAL
Status: COMPLETED | OUTPATIENT
Start: 2025-05-13 | End: 2025-05-13

## 2025-05-13 RX ORDER — SODIUM CHLORIDE 0.9 % (FLUSH) 0.9 %
3-10 SYRINGE (ML) INJECTION AS NEEDED
Status: DISCONTINUED | OUTPATIENT
Start: 2025-05-13 | End: 2025-05-13 | Stop reason: HOSPADM

## 2025-05-13 RX ORDER — ONDANSETRON 2 MG/ML
INJECTION INTRAMUSCULAR; INTRAVENOUS AS NEEDED
Status: DISCONTINUED | OUTPATIENT
Start: 2025-05-13 | End: 2025-05-13 | Stop reason: SURG

## 2025-05-13 RX ORDER — KETOROLAC TROMETHAMINE 5 MG/ML
1 SOLUTION OPHTHALMIC EVERY 4 HOURS PRN
Status: DISCONTINUED | OUTPATIENT
Start: 2025-05-13 | End: 2025-05-13 | Stop reason: HOSPADM

## 2025-05-13 RX ORDER — ERYTHROMYCIN 5 MG/G
1 OINTMENT OPHTHALMIC EVERY 6 HOURS SCHEDULED
Status: DISCONTINUED | OUTPATIENT
Start: 2025-05-13 | End: 2025-05-13 | Stop reason: HOSPADM

## 2025-05-13 RX ORDER — DEXAMETHASONE SODIUM PHOSPHATE 4 MG/ML
INJECTION, SOLUTION INTRA-ARTICULAR; INTRALESIONAL; INTRAMUSCULAR; INTRAVENOUS; SOFT TISSUE
Status: COMPLETED | OUTPATIENT
Start: 2025-05-13 | End: 2025-05-13

## 2025-05-13 RX ORDER — OXYCODONE AND ACETAMINOPHEN 7.5; 325 MG/1; MG/1
1 TABLET ORAL EVERY 4 HOURS PRN
Status: DISCONTINUED | OUTPATIENT
Start: 2025-05-13 | End: 2025-05-13 | Stop reason: HOSPADM

## 2025-05-13 RX ORDER — PROMETHAZINE HYDROCHLORIDE 25 MG/1
25 TABLET ORAL ONCE AS NEEDED
Status: DISCONTINUED | OUTPATIENT
Start: 2025-05-13 | End: 2025-05-13 | Stop reason: HOSPADM

## 2025-05-13 RX ORDER — SODIUM CHLORIDE, SODIUM LACTATE, POTASSIUM CHLORIDE, CALCIUM CHLORIDE 600; 310; 30; 20 MG/100ML; MG/100ML; MG/100ML; MG/100ML
9 INJECTION, SOLUTION INTRAVENOUS CONTINUOUS
Status: DISCONTINUED | OUTPATIENT
Start: 2025-05-13 | End: 2025-05-13 | Stop reason: HOSPADM

## 2025-05-13 RX ORDER — SENNOSIDES 8.6 MG
325 CAPSULE ORAL DAILY
Qty: 45 TABLET | Refills: 0 | Status: SHIPPED | OUTPATIENT
Start: 2025-05-14

## 2025-05-13 RX ORDER — FENTANYL CITRATE 50 UG/ML
50 INJECTION, SOLUTION INTRAMUSCULAR; INTRAVENOUS ONCE AS NEEDED
Status: COMPLETED | OUTPATIENT
Start: 2025-05-13 | End: 2025-05-13

## 2025-05-13 RX ORDER — EPHEDRINE SULFATE 50 MG/ML
5 INJECTION, SOLUTION INTRAVENOUS ONCE AS NEEDED
Status: DISCONTINUED | OUTPATIENT
Start: 2025-05-13 | End: 2025-05-13 | Stop reason: HOSPADM

## 2025-05-13 RX ORDER — ROCURONIUM BROMIDE 10 MG/ML
INJECTION, SOLUTION INTRAVENOUS AS NEEDED
Status: DISCONTINUED | OUTPATIENT
Start: 2025-05-13 | End: 2025-05-13 | Stop reason: SURG

## 2025-05-13 RX ORDER — LABETALOL HYDROCHLORIDE 5 MG/ML
5 INJECTION, SOLUTION INTRAVENOUS
Status: DISCONTINUED | OUTPATIENT
Start: 2025-05-13 | End: 2025-05-13 | Stop reason: HOSPADM

## 2025-05-13 RX ORDER — DEXAMETHASONE SODIUM PHOSPHATE 4 MG/ML
INJECTION, SOLUTION INTRA-ARTICULAR; INTRALESIONAL; INTRAMUSCULAR; INTRAVENOUS; SOFT TISSUE AS NEEDED
Status: DISCONTINUED | OUTPATIENT
Start: 2025-05-13 | End: 2025-05-13 | Stop reason: SURG

## 2025-05-13 RX ORDER — PROPOFOL 10 MG/ML
VIAL (ML) INTRAVENOUS AS NEEDED
Status: DISCONTINUED | OUTPATIENT
Start: 2025-05-13 | End: 2025-05-13 | Stop reason: SURG

## 2025-05-13 RX ORDER — ATROPINE SULFATE 0.4 MG/ML
0.4 INJECTION, SOLUTION INTRAMUSCULAR; INTRAVENOUS; SUBCUTANEOUS ONCE AS NEEDED
Status: DISCONTINUED | OUTPATIENT
Start: 2025-05-13 | End: 2025-05-13 | Stop reason: HOSPADM

## 2025-05-13 RX ADMIN — DEXAMETHASONE SODIUM PHOSPHATE 8 MG: 4 INJECTION, SOLUTION INTRAMUSCULAR; INTRAVENOUS at 07:05

## 2025-05-13 RX ADMIN — PROPOFOL 200 MG: 10 INJECTION, EMULSION INTRAVENOUS at 07:00

## 2025-05-13 RX ADMIN — LIDOCAINE HYDROCHLORIDE 80 MG: 20 INJECTION, SOLUTION EPIDURAL; INFILTRATION; INTRACAUDAL; PERINEURAL at 07:00

## 2025-05-13 RX ADMIN — ROCURONIUM BROMIDE 30 MG: 10 INJECTION, SOLUTION INTRAVENOUS at 08:39

## 2025-05-13 RX ADMIN — ONDANSETRON 4 MG: 2 INJECTION, SOLUTION INTRAMUSCULAR; INTRAVENOUS at 09:47

## 2025-05-13 RX ADMIN — BUPIVACAINE HYDROCHLORIDE AND EPINEPHRINE BITARTRATE 10 ML: 2.5; .005 INJECTION, SOLUTION EPIDURAL; INFILTRATION; INTRACAUDAL; PERINEURAL at 06:38

## 2025-05-13 RX ADMIN — DEXAMETHASONE SODIUM PHOSPHATE 2.5 MG: 4 INJECTION, SOLUTION INTRA-ARTICULAR; INTRALESIONAL; INTRAMUSCULAR; INTRAVENOUS; SOFT TISSUE at 06:35

## 2025-05-13 RX ADMIN — SODIUM CHLORIDE, POTASSIUM CHLORIDE, SODIUM LACTATE AND CALCIUM CHLORIDE: 600; 310; 30; 20 INJECTION, SOLUTION INTRAVENOUS at 09:16

## 2025-05-13 RX ADMIN — ROPIVACAINE HYDROCHLORIDE 15 ML: 5 INJECTION, SOLUTION EPIDURAL; INFILTRATION; PERINEURAL at 06:35

## 2025-05-13 RX ADMIN — SODIUM CHLORIDE, POTASSIUM CHLORIDE, SODIUM LACTATE AND CALCIUM CHLORIDE 9 ML/HR: 600; 310; 30; 20 INJECTION, SOLUTION INTRAVENOUS at 06:20

## 2025-05-13 RX ADMIN — SUGAMMADEX 200 MG: 100 INJECTION, SOLUTION INTRAVENOUS at 10:32

## 2025-05-13 RX ADMIN — PROPARACAINE HYDROCHLORIDE 1 DROP: 5 SOLUTION/ DROPS OPHTHALMIC at 13:09

## 2025-05-13 RX ADMIN — ROCURONIUM BROMIDE 20 MG: 10 INJECTION, SOLUTION INTRAVENOUS at 09:30

## 2025-05-13 RX ADMIN — BUPIVACAINE HYDROCHLORIDE AND EPINEPHRINE 10 ML: 2.5; 5 INJECTION, SOLUTION EPIDURAL; INFILTRATION; INTRACAUDAL; PERINEURAL at 06:35

## 2025-05-13 RX ADMIN — DEXAMETHASONE SODIUM PHOSPHATE 2 MG: 4 INJECTION, SOLUTION INTRA-ARTICULAR; INTRALESIONAL; INTRAMUSCULAR; INTRAVENOUS; SOFT TISSUE at 06:38

## 2025-05-13 RX ADMIN — ERYTHROMYCIN 1 APPLICATION: 5 OINTMENT OPHTHALMIC at 13:10

## 2025-05-13 RX ADMIN — ROPIVACAINE HYDROCHLORIDE 10 ML: 5 INJECTION EPIDURAL; INFILTRATION; PERINEURAL at 06:38

## 2025-05-13 RX ADMIN — FAMOTIDINE 20 MG: 10 INJECTION INTRAVENOUS at 06:22

## 2025-05-13 RX ADMIN — CEFAZOLIN 2 G: 2 INJECTION, POWDER, FOR SOLUTION INTRAMUSCULAR; INTRAVENOUS at 06:50

## 2025-05-13 RX ADMIN — WHITE PETROLATUM 57.7 %-MINERAL OIL 31.9 % EYE OINTMENT: at 12:17

## 2025-05-13 RX ADMIN — KETOROLAC TROMETHAMINE 1 DROP: 5 SOLUTION/ DROPS OPHTHALMIC at 13:15

## 2025-05-13 RX ADMIN — MIDAZOLAM 1 MG: 1 INJECTION INTRAMUSCULAR; INTRAVENOUS at 06:25

## 2025-05-13 RX ADMIN — FENTANYL CITRATE 50 MCG: 50 INJECTION, SOLUTION INTRAMUSCULAR; INTRAVENOUS at 06:25

## 2025-05-13 RX ADMIN — ROCURONIUM BROMIDE 50 MG: 10 INJECTION, SOLUTION INTRAVENOUS at 07:00

## 2025-05-13 NOTE — INTERVAL H&P NOTE
H&P updated. The patient was examined and there was no appreciable change to the exam.  She and her daughter both voiced clear understand the operative procedure and algorithm with postoperative course instructions clearly understood with associated risk benefits potential outcomes and complications.

## 2025-05-13 NOTE — ANESTHESIA PROCEDURE NOTES
Peripheral Block      Patient reassessed immediately prior to procedure    Patient location during procedure: holding area  Start time: 5/13/2025 6:36 AM  Stop time: 5/13/2025 6:38 AM  Reason for block: at surgeon's request and post-op pain management  Performed by  Anesthesiologist: Jose Marcano DO  Preanesthetic Checklist  Completed: patient identified, IV checked, site marked, risks and benefits discussed, surgical consent, monitors and equipment checked, pre-op evaluation and timeout performed  Prep:  Pt Position: supine  Sterile barriers:gloves, mask, cap and washed/disinfected hands  Prep: ChloraPrep  Patient monitoring: blood pressure monitoring, continuous pulse oximetry and EKG  Procedure    Sedation: yes  Performed under: local infiltration  Guidance:ultrasound guided  Images:still images obtained, printed/placed on chart    Laterality:left  Block Type:adductor canal block  Injection Technique:single-shot  Needle Type:echogenic  Needle Gauge:22 G  Resistance on Injection: none    Medications Used: dexamethasone (DECADRON) injection - Peripheral Nerve   2 mg - 5/13/2025 6:38:00 AM  ropivacaine (NAROPIN) 0.5 % injection - Peripheral Nerve   10 mL - 5/13/2025 6:38:00 AM  bupivacaine-EPINEPHrine PF (MARCAINE w/EPI) 0.25% -[id number] injection - Peripheral Nerve   10 mL - 5/13/2025 6:38:00 AM      Medications  Comment:Ultrasound Interpretation:  Using ultrasound guidance the needle was placed in close proximity to the adductor canal and anesthetic was injected in the area of the nerve and spread of the anesthetic was seen on ultrasound in close proximity thereto.  There were no abnormalities seen on ultrasound; a digital image was taken; and the patient tolerated the procedure with no complications.         Post Assessment  Injection Assessment: negative aspiration for heme, no paresthesia on injection and incremental injection  Patient Tolerance:comfortable throughout  block  Complications:no  Additional Notes  Ultrasound guidance was used to both view and verify local anesthetic placement and disbursement. In addition, it was used to evaluate the respective anatomy to determine needle approach and placement.  Performed by: Jose Marcano DO

## 2025-05-13 NOTE — BRIEF OP NOTE
ANKLE TENDON REPAIR  Progress Note    Sakina EDMONDS Chey  5/13/2025    Pre-op Diagnosis:   Posterior tibial tendon dysfunction [M76.829]  Pain associated with accessory navicular bone of foot, left [M79.672, Q74.2]  Pes planus of left foot [M21.42]  Impingement syndrome of left ankle [M25.872]  Sinus tarsitis of left foot [M25.572]       Post-Op Diagnosis Codes:     * Posterior tibial tendon dysfunction [M76.829]     * Pain associated with accessory navicular bone of foot, left [M79.672, Q74.2]     * Pes planus of left foot [M21.42]     * Impingement syndrome of left ankle [M25.872]     * Sinus tarsitis of left foot [M25.572]    Procedure(s):      Procedure(s):  Left posterior tib tendon debridement and repair.  Left calcaneal osteotomy and medial midfoot osteotomy.              Surgeon(s):  Primo Pride MD    Anesthesia: General with Block    Staff:   Circulator: Stephany Sandhu RN  Radiology Technologist: Laurent Franklin  Scrub Person: Alex Walton; Lee Valles  Vendor Representative: Hitesh Davila; Primo Singh       Estimated Blood Loss:  10 ml    Urine Voided: * No values recorded between 5/13/2025  6:54 AM and 5/13/2025 10:44 AM *    Specimens:                None      Drains: * No LDAs found *    Findings: see dict      Complications: none     min          Primo Pride MD     Date: 5/13/2025  Time: 11:58 EDT

## 2025-05-13 NOTE — ANESTHESIA POSTPROCEDURE EVALUATION
Patient: Sakina EDMONDS Depintcatalina    Procedure Summary       Date: 05/13/25 Room / Location: [facility] OR [address] [place] [address] [facility]    Anesthesia Start: 0654 Anesthesia Stop: 1046    Procedure: Left posterior tib tendon debridement and repair.  Left calcaneal osteotomy and medial midfoot osteotomy. (Left: Ankle) Diagnosis:       Posterior tibial tendon dysfunction      Pain associated with accessory navicular bone of foot, left      Pes planus of left foot      Impingement syndrome of left ankle      Sinus tarsitis of left foot      (Posterior tibial tendon dysfunction [M76.829])      (Pain associated with accessory navicular bone of foot, left [M79.672, Q74.2])      (Pes planus of left foot [M21.42])      (Impingement syndrome of left ankle [M25.872])      (Sinus tarsitis of left foot [M25.572])    Surgeons: Primo Pride MD Provider: Scotty Carrasco MD    Anesthesia Type: general ASA Status: 2            Anesthesia Type: No value filed.    Vitals  Vitals Value Taken Time   BP 97/60 05/13/25 12:15   Temp 36.4 °C (97.6 °F) 05/13/25 12:30   Pulse 70 05/13/25 12:30   Resp 22 05/13/25 11:45   SpO2 96 % 05/13/25 12:30           Post Anesthesia Care and Evaluation    Level of consciousness: awake  Pain management: adequate    Airway patency: patent  PONV Status: none  Cardiovascular status: acceptable  Respiratory status: acceptable  Hydration status: acceptable    
[Cardiac Failure] : cardiac failure

## 2025-05-13 NOTE — OP NOTE
Operative Note      Facility: [facility]  Patient Name: Sakina Guerrier  YOB: 1965  Date: 5/13/2025  Medical Record Number: [id number]      Pre-op Diagnosis: 1.  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  Left foot pronation deformity  3.  Left sinus tarsitis with impingement      Post-op Diagnosis:   1.  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  Left foot pronation deformity  3.  Left sinus tarsitis with impingement        Procedure(s):  1.  Left medial displacement calcaneal osteotomy using Arthrex 1 cm step-off plate    2.  Left posterior tib tendon Kidner procedure with excision of accessory navicular and advancement with secondary reconstruction of posterior tib tendon to medial navicular     3.  Left medial cuneiform opening wedge plantarflexion osteotomy using Arthrex cancellous Allosync anatomic reconstruction wedge    Surgeon(s):  Primo Pride MD    Anesthesia: General with Block  Anesthesiologist: Scotty Carrasco MD  CRNA: Inés Farfan CRNA    Staff:   Circulator: Stephany Sandhu RN  Radiology Technologist: Laurent Franklin  Scrub Person: Alex Walton; Lee Valles  Vendor Representative: Hitesh Davila; Primo Singh  Assistants : none      Tourniquet time: 120 minutes        Estimated Blood Loss:   10 ml  Drains: None  Specimens: * No orders in the log *  Findings: See Dictation    Complications: None      Indications for Procedure: Patient has had a history of asymmetric pronation deformity with pes planus left hindfoot with sinus tarsi impingement that has been unimproved with exhaustive conservative measures.  Posterior tib tendon normal on MRI the exception of an accessory navicular and had good inversion strength.  We discussed treatment algorithm with mutual decision made to proceed with operative treatment as outlined above with clear understanding of risk benefits potential outcomes and  complications.          Description of Procedure: Preoperative informed consent and anesthesia evaluation were obtained.  IV antibiotics were administered and the surgical site was marked.  Block was administered by anesthesia.  Patient was brought to the operating room placed in supine position anesthesia was induced and patient was intubated.  Well-padded tourniquet was placed left proximal thigh.  Ipsilateral hip bump was positioned.    Left leg was prepped and draped in sterile fashion and surgical timeout was performed.  Bony landmarks were delineated about the left heel.  Left leg was exsanguinated and pneumatic tourniquet inflated to 250 mmHg.  Using x-ray as a guide as well as clinical anatomic landmarks I designed and created an oblique incision over the lateral aspect of the left calcaneus.  This was divided down through subcutaneous tissue only.  Blunt dissection was then carried out and branches of the sural nerve were directly visualized and kept within the anterior flap.  I then divided down onto the periosteum and used an elevator to elevate the periosteum anterior and posterior to the area of the proposed osteotomy site.  I then placed retractors proximal and distal to the calcaneus and confirmed the trajectory of the osteotomy clinically and radiographically.  Sagittal saw was then used to create the osteotomy in a transverse fashion up to but not through the medial cortex.  This was then gently pried open and the cortex was greenstick.  I was then able to use a smooth lamina  to distract the osteotomy site and allow soft tissue relaxation.  The posterior tuberosity was translated 1 cm medially.  I then positioned the Arthrex step-off plate on the distal fragment and secured this with 2 locking screws.  Then while holding the proximal aspect of the plate along the cortex the drill was then passed for the homerun screw and confirmed radiographically.  This was then passed with excellent  compression across the osteotomy site and plate laid flush on the lateral cortex.  2 additional locking screws were placed in the plate proximally.  X-rays showed good containment and alignment to the hardware and osteotomy and clinically there was good alignment with improved alignment to the heel.  Wound was then thoroughly irrigated    Ipsilateral hip bump was then repositioned to allow exposure to the medial aspect of the midfoot.  The bony landmarks about the navicular were identified clinically and radiographically.  Incision was made beginning just distal to this and carried proximally.  Full-thickness eyes were carefully elevated with care taken avoid superficial veins and nerves were possible.  Tendon sheath was then divided with return of fluid.  The distal posterior tib tendon and its attachment of the navicular were inspected and found to be intact and in decent condition with maybe some very mild degenerative changes over the medial aspect of the navicular but none over the far distal aspect of the posterior tib tendon.    I used a [place] blade to elevate the posterior tib tendon attachment of the medial navicular beginning dorsally and leading this intact distally and plantarly.  I encountered the accessory navicular and this was carefully shelled out and passed off.  I grasped the posterior tib tendon which appeared to be intact and robust without evidence of interstitial changes and it had good excursion.  I did not feel that FDL tendon transfer was necessary.    I used a sagittal saw to resect about 2 to 3 mm from the medial navicular to get a fresh and cancellous surface.  Then under x-ray guidance I passed 2 fiber tacks into this which were well secured.  Wound was thoroughly irrigated and the posterior tib tendon was then advanced up onto the cancellous portion from the more robust portion proximal to where the accessory navicular had been excised.  Both limbs of both fiber tacks were passed  through this in a mattress fashion and this was advanced onto the cancellous surface after was sprayed with PRP and tied.  Remaining periosteum was then repaired dorsally with 2-0 FiberWire suture.  The leading limbs of the fiber tacks were then brought back over this repair and oversewn to the periosteum distally dorsally and plantarly this created a nice robust repair.    The arch was nicely reconstituted in the pronation deformity corrected there was residual forefoot varus and per preoperative discussion I felt it was best to perform a dorsal plantarflexion osteotomy of the cuneiform in order to restore the foot tripod.    The bony landmarks of the medial cuneiform were identified radiographically and delineated.  Incision was made centered over this and carried just proximal and distal to the joints.  Full-thickness flaps were carefully elevated with care taken that was superficial veins and nerves were possible.  EHL tendon was identified and mobilized medially.  The central aspect of the medial cuneiform was identified clinically and radiographically in the craniocaudad and medial lateral projections as well as on the lateral view with limited periosteal disruption.  A guidewire was passed in this area and found to be acceptably contained.  Then while protecting the anterior tib and EHL tendons incomplete osteotomy was made through the medial cuneiform with care taken not to violate middle cuneiform.  This was read incomplete leaving plantar hinge.  I then carefully hinged this open using wedging osteotomes and  with care taken not to violate the plantar cortex confirmed clinically and radiographically.  Once the tripod was restored trials were used and this measured 6.5 mm.  I then sprayed the site with PRP and placed the 6.5 x 16 mm Allosync cancellous wedge into this as this was what was available.  The traction was released and this was tamped into place.  There was excellent fit and compression  and graft was very stable and I did not feel required additional fixation nor did I want to risk irritation of the EHL tendon with dorsal hardware.  X-ray showed good containment and alignment.  Talus was well covered and calcaneal osteotomy remained in good alignment.  Restoration of the foot tripod.    Tourniquet was released and hemostasis was obtained.  The dorsal wound was irrigated and periosteum was closed over the osteotomy site with 3-0 Vicryl suture.  Skin was closed with 3-0 Vicryl and 3-0 nylon.  The medial wound was irrigated and sprayed with PRP.  The tendon sheath was repaired with 3-0 Vicryl suture and skin was closed with 3-0 Vicryl and 3-0 nylon    The lateral wound was inspected and irrigated and osteotomy remained in good alignment and sural nerve was directly visualized and remained intact.    She was then close deep to the sural nerve with 3-0 Vicryl suture and skin was closed with 3-0 Vicryl and 3-0 nylon.  Sterile dressings were applied and patient was placed in a very well-padded short leg nonweightbearing fiberglass posterior splint with lateral buttress of the foot in plantarflexion and inversion.  She was awakened transferred to [facility]] taken recovery in stable condition

## 2025-05-13 NOTE — ANESTHESIA PREPROCEDURE EVALUATION
Anesthesia Evaluation     Patient summary reviewed   no history of anesthetic complications:   NPO Solid Status: > 8 hours  NPO Liquid Status: > 2 hours           Airway   Mallampati: II  TM distance: >3 FB  Neck ROM: full  no difficulty expected  Dental - normal exam     Pulmonary     breath sounds clear to auscultation  (-) shortness of breath, recent URI, not a smokerSleep apnea: STOP BANG 0.  Cardiovascular   Exercise tolerance: good (4-7 METS)    ECG reviewed  Rhythm: regular  Rate: normal    (+) dysrhythmias (h/o SVT ns) Tachycardia, hyperlipidemia  (-) past MI, angina      Neuro/Psych  (+) syncope (h/o w/ SVT, denies any currently), psychiatric history Anxiety and Depression  (-) seizures, CVA  GI/Hepatic/Renal/Endo    (+) GERD, thyroid problem hypothyroidism  (-)  obesity, no renal disease, diabetes    Musculoskeletal     (-) neck stiffness  Abdominal    Substance History      OB/GYN          Other   arthritis,                       Anesthesia Plan    ASA 2     general     (+ACB/distal sciatic USGSS for POPC     I have reviewed the patient's history and chart with the patient, including all pertinent laboratory results and imaging. I have explained the risks of anesthesia including but not limited to dental damage, corneal abrasion, nerve injury, MI, stroke, aspiration, and death. Patient has agreed to proceed.      Risks of peripheral nerve block were discussed with patient including but not limited to: inadequate block, bleeding, infection, persistent numbness or weakness, nerve damage, painful dysasthesia and need to protect limb while numb.)  intravenous induction     Anesthetic plan, risks, benefits, and alternatives have been provided, discussed and informed consent has been obtained with: patient.    Use of blood products discussed with patient .

## 2025-05-13 NOTE — ANESTHESIA PROCEDURE NOTES
Peripheral Block    Pre-sedation assessment completed: 5/13/2025 6:30 AM    Patient reassessed immediately prior to procedure    Patient location during procedure: holding area  Start time: 5/13/2025 6:32 AM  Stop time: 5/13/2025 6:35 AM  Reason for block: at surgeon's request and post-op pain management  Performed by  Anesthesiologist: Jose Marcano DO  Preanesthetic Checklist  Completed: patient identified, IV checked, site marked, risks and benefits discussed, surgical consent, monitors and equipment checked, pre-op evaluation and timeout performed  Prep:  Pt Position: supine  Sterile barriers:gloves, mask, cap and washed/disinfected hands  Prep: ChloraPrep  Patient monitoring: blood pressure monitoring, continuous pulse oximetry and EKG  Procedure    Sedation: yes  Performed under: local infiltration  Guidance:ultrasound guided  Images:still images obtained, printed/placed on chart    Laterality:left  Block Type:sciatic (distal)  Injection Technique:single-shot  Needle Type:echogenic  Needle Gauge:22 G  Resistance on Injection: none    Medications Used: dexamethasone (DECADRON) injection - Peripheral Nerve   2.5 mg - 5/13/2025 6:35:00 AM  ropivacaine (NAROPIN) 0.5 % injection - Peripheral Nerve   15 mL - 5/13/2025 6:35:00 AM  bupivacaine-EPINEPHrine PF (MARCAINE w/EPI) 0.25% -1:195027 injection - Peripheral Nerve   10 mL - 5/13/2025 6:35:00 AM      Medications  Comment:Ultrasound Interpretation:  Using ultrasound guidance the needle was placed in close proximity to the distal sciatic nerve and anesthetic was injected in the area of the nerve and spread of the anesthetic was seen on ultrasound in close proximity thereto.  There were no abnormalities seen on ultrasound; a digital image was taken; and the patient tolerated the procedure with no complications.         Post Assessment  Injection Assessment: negative aspiration for heme, no paresthesia on injection and incremental injection  Patient  Tolerance:comfortable throughout block  Complications:no  Additional Notes  Ultrasound guidance was used to both view and verify local anesthetic placement and disbursement. In addition, it was used to evaluate the respective anatomy to determine needle approach and placement.  Performed by: Jose Marcano DO

## 2025-05-13 NOTE — ANESTHESIA PROCEDURE NOTES
Airway  Reason: elective    Date/Time: 5/13/2025 7:03 AM  Airway not difficult    General Information and Staff    Patient location during procedure: OR  Anesthesiologist: Scotty Carrasco MD  CRNA/CAA: Inés Farfan CRNA    Indications and Patient Condition  Indications for airway management: airway protection    Preoxygenated: yes    Mask difficulty assessment: 1 - vent by mask    Final Airway Details    Final airway type: endotracheal airway      Successful airway: ETT  Cuffed: yes   Successful intubation technique: direct laryngoscopy  Adjuncts used in placement: intubating stylet and anterior pressure/BURP  Endotracheal tube insertion site: oral  Blade: Zuleyka  Blade size: 3  ETT size (mm): 7.0  Cormack-Lehane Classification: grade IIa - partial view of glottis  Placement verified by: chest auscultation and capnometry   Measured from: lips  ETT/EBT  to lips (cm): 21  Number of attempts at approach: 1  Assessment: lips, teeth, and gum same as pre-op and atraumatic intubation

## 2025-05-13 NOTE — DISCHARGE INSTRUCTIONS
What to expect after a Nerve Block    Nerve blocks administered to block pain affect many types of nerves, including those nerves that control movement, pain, and normal sensation. Following a nerve block, you may notice some bruising at the site where the block was given. You may experience sensations such as: numbness of the affected area or limb, tingling, heaviness (that is the limb feels heavy to you), weakness or inability to move the affected arm or leg, or a feeling as if your arm or leg has “fallen asleep.”     A nerve block can last from 2 to 36 hours depending on the medications used.  Usually the weakness wears off first followed by the tingling and heaviness. As the block wears off, you may begin to notice pain; however, this sequence of events may occur in any order. Typically, you will be able to move your limb before you will feel it. Once a nerve block begins to wear off, the effects are usually completely gone within 60 minutes.  If you experience continued side effects that you believe are block related for longer than 48 hours, please call your healthcare provider. Please see block-specific instructions below.    Instructions for any Block involving the leg/foot:   If you have had a leg /foot block, you should not bear weight on the affected leg until the block has worn off. After the block has worn off, weight bearing should be as directed by your surgeon. You may be sent home with crutches. You are at high risk for falling because of the anesthetic effects on your leg. Please use caution when standing or trying to move or walk. Have someone assist you until your leg and foot function have returned to normal.     Protection of a “blocked” limb  After a nerve block, you cannot feel pain, pressure, or extremes of temperature in the affected limb. And because of this, your blocked limb is at more risk for injury. For example, it is possible to burn your limb on an extremely hot surface without  feeling it.     When resting, it is important to reposition your limb periodically to avoid prolonged pressure on it. This may require the use of pillows and padding.    While sleeping, you should avoid rolling onto the affected limb or putting too much pressure on it.     If you have a cast or tight dressing, check the color of your fingers or toes of the affected limb. Call your surgeon if they look discolored (that is, dusky, dark colored).    Use caution in cold weather. Cover your limb appropriately to protect it from the cold.    Pain Management:  Your surgeon will give you a prescription for pain medication. Begin taking this before the nerve block wears off. Bear in mind that sometimes the block can wear off in the middle of the night.        Corneal Abrasion Discharge Instructions      Bacitracin Ophthalmic Ointment or Erythromycin Ophthalmic Ointment:   Apply a thin layer to affected eye 3 times daily for a maximum of 2 days.    An eye patch is not needed.  Do not rub the affected eye.    If you have no improvement within the next 24 hours, please contact your eye doctor.

## 2025-05-19 DIAGNOSIS — M25.872 IMPINGEMENT SYNDROME OF LEFT ANKLE: ICD-10-CM

## 2025-05-19 DIAGNOSIS — M21.42 PES PLANUS OF LEFT FOOT: ICD-10-CM

## 2025-05-19 DIAGNOSIS — M25.572 SINUS TARSITIS OF LEFT FOOT: ICD-10-CM

## 2025-05-19 DIAGNOSIS — M76.829 POSTERIOR TIBIAL TENDON DYSFUNCTION: ICD-10-CM

## 2025-05-19 RX ORDER — OXYCODONE AND ACETAMINOPHEN 5; 325 MG/1; MG/1
1-2 TABLET ORAL EVERY 4 HOURS PRN
Qty: 42 TABLET | Refills: 0 | Status: SHIPPED | OUTPATIENT
Start: 2025-05-19

## 2025-05-19 RX ORDER — CYCLOSPORINE 0.5 MG/ML
1 EMULSION OPHTHALMIC EVERY 12 HOURS
OUTPATIENT
Start: 2025-05-19

## 2025-05-28 ENCOUNTER — OFFICE VISIT (OUTPATIENT)
Dept: ORTHOPEDIC SURGERY | Facility: CLINIC | Age: 60
End: 2025-05-28
Payer: MEDICAID

## 2025-05-28 VITALS — BODY MASS INDEX: 26.68 KG/M2 | TEMPERATURE: 97.1 F | WEIGHT: 170 LBS | HEIGHT: 67 IN

## 2025-05-28 DIAGNOSIS — M76.829 POSTERIOR TIBIAL TENDON DYSFUNCTION: Primary | ICD-10-CM

## 2025-05-28 DIAGNOSIS — M79.672 PAIN ASSOCIATED WITH ACCESSORY NAVICULAR BONE OF FOOT, LEFT: ICD-10-CM

## 2025-05-28 DIAGNOSIS — M25.872 IMPINGEMENT SYNDROME OF LEFT ANKLE: ICD-10-CM

## 2025-05-28 DIAGNOSIS — Q74.2 PAIN ASSOCIATED WITH ACCESSORY NAVICULAR BONE OF FOOT, LEFT: ICD-10-CM

## 2025-05-28 DIAGNOSIS — R52 PAIN: ICD-10-CM

## 2025-05-28 DIAGNOSIS — M21.40 PES PLANUS, UNSPECIFIED LATERALITY: ICD-10-CM

## 2025-05-28 DIAGNOSIS — M25.572 SINUS TARSITIS OF LEFT FOOT: ICD-10-CM

## 2025-05-28 NOTE — PROGRESS NOTES
"Ankle Follow Up      Patient: Sakina Guerrier    YOB: 1965 60 y.o. female    Chief Complaints: Ankle foot \"doing okay    History of Present Illness: The patient underwent left calcaneal medial displacement osteotomy with Arthrex step plate as well as excision of accessory navicular and secondary reconstruction/advancement of the posterior tib tendon and cotton osteotomy using cancellous wedge on 5/13/2025 for chronic pes planus with lateral impingement.  Please see note from 4/28/2025 for details.  She also had previous fifth metatarsal fracture treated operatively and subsequent hardware removal and bilateral third interdigital neuroma excisions performed previously by podiatry.    Patient is seen back today reporting she is doing okay.  She been nonweightbearing and elevating and pain is improving.  She is off her pain medication.  She continues on aspirin for DVT prophylaxis.  Pain is rated 4 out of 10  HPI    ROS: ankle pain  Past Medical History:   Diagnosis Date    Abnormal Pap smear of cervix     Acne     Acquired hypothyroidism     Anxiety     Cancer 09/2007    VULVAR CARCINOMA IN SITU    Colon polyp 2017    Found last colonoscopy    Depression     Endometriosis     GERD (gastroesophageal reflux disease) 2021    Hammertoe of right foot 04/24/2015    4TH RIGHT TOE    Hemorrhoids     Hip arthrosis 9 months    HPV (human papilloma virus) infection     Influenza A 02/06/2018    Lateral epicondylitis of right elbow 07/26/2013    Left knee pain 10/19/2017    SAW DR. GHASSAN COLE    Left ovarian cyst 08/2006    FOLLICLE CYST    Lesion of left plantar nerve     Menopause     Neuroma of foot 5 years    Surgery to correct    Onychomycosis 03/18/2014    Primary insomnia     Rheumatic fever     AS A CHILD    Right hip pain     Syncope 02/07/2018    SEEN AT [facility]    Thrombosed hemorrhoids 09/26/2017    SAW DR. FAUZIA GIRON    Varicella     Ventricular tachycardia        Physical Exam:   Vitals:    " "05/28/25 1008   Temp: 97.1 °F (36.2 °C)   Weight: 77.1 kg (170 lb)   Height: 170.2 cm (67\")   PainSc: 4      Well developed with normal mood.  On exam her lateral hindfoot wound medial midfoot and dorsal midfoot wounds appear to be healing there is maybe little bit of darkening around the dorsal foot wound but no sign of infection or gross dehiscence.  She had diminished sensation in the sural nerve distribution but was otherwise intact to light touch.  Calf was nontender without sign of DVT      Radiology: 3 views left foot ordered evaluate postoperative alignment reviewed with patient and compared to previous x-rays do show good coverage of the talar head and partial resection of the medial navicular and excision of accessory navicular.  There is good alignment to the first ray and medial cuneiform.  Calcaneal osteotomy appears to be well aligned and hardware appears to be well-contained.     MRI films and report of the left hindfoot dated 11/29/2022 reviewed which showed narrowing of the tarsal sinus posteriorly with loss of fat signal in that area and adjacent mild marrow edema of the calcaneus     Plantar lateral talar cortex abuts the dorsal cortex of the anterior calcaneal process.  Posterior tib tendon maintains normal signal and thickness with no tendon sheath effusion.  There is a 6 mm accessory ossicle within the substance of the distal posterior tib tendon.  Other ankle ligaments and tendons appeared intact at that articular cartilage.        Assessment/Plan: Status post left foot/ankle surgery as outlined above      1.  Left pes planus with sinus tarsi impingement  2.  Left accessory navicular with some posterior tib tendon dysfunction    We discussed treatment going forward I do not see any sign of wound infection or wound complication at this time.  Reviewed with her that the sural nerve was in the area of the plate application but was identified mobilized and was intact.  May have some swelling " around it that should improve over time and I will see any sign of RSD.    I reviewed the operative procedure in detail with her.  Sutures removed and Steri-Strips were applied.  Sterile dressings were placed and she was placed in a well-padded short leg nonweightbearing fiberglass cast in neutral dorsiflexion and slight inversion.    She will continue with elevation and nonweightbearing.  She was provided with a handicap parking application    Will see her back in 2 weeks for x-rays of her left foot.

## 2025-06-11 ENCOUNTER — OFFICE VISIT (OUTPATIENT)
Dept: ORTHOPEDIC SURGERY | Facility: CLINIC | Age: 60
End: 2025-06-11
Payer: MEDICAID

## 2025-06-11 VITALS — HEIGHT: 67 IN | WEIGHT: 170 LBS | BODY MASS INDEX: 26.68 KG/M2 | TEMPERATURE: 97.3 F

## 2025-06-11 DIAGNOSIS — M76.829 POSTERIOR TIBIAL TENDON DYSFUNCTION: Primary | ICD-10-CM

## 2025-06-11 DIAGNOSIS — M25.572 SINUS TARSITIS OF LEFT FOOT: ICD-10-CM

## 2025-06-11 DIAGNOSIS — M25.872 IMPINGEMENT SYNDROME OF LEFT ANKLE: ICD-10-CM

## 2025-06-11 DIAGNOSIS — M21.40 PES PLANUS, UNSPECIFIED LATERALITY: ICD-10-CM

## 2025-06-11 DIAGNOSIS — M79.672 PAIN ASSOCIATED WITH ACCESSORY NAVICULAR BONE OF FOOT, LEFT: ICD-10-CM

## 2025-06-11 DIAGNOSIS — Q74.2 PAIN ASSOCIATED WITH ACCESSORY NAVICULAR BONE OF FOOT, LEFT: ICD-10-CM

## 2025-06-11 NOTE — PROGRESS NOTES
"Ankle Follow Up      Patient: Sakina Guerrier    YOB: 1965 60 y.o. female    Chief Complaints: Ankle and foot doing \"okay\"    History of Present Illness:patient underwent left calcaneal medial displacement osteotomy with Arthrex step plate as well as excision of accessory navicular and secondary reconstruction/advancement of the posterior tib tendon and cotton osteotomy using cancellous wedge on 5/13/2025 for chronic pes planus with lateral impingement.  Please see note from 4/28/2025 for details.  She also had previous fifth metatarsal fracture treated operatively and subsequent hardware removal and bilateral third interdigital neuroma excisions performed previously by podiatry.     Patient was seen on 5/28/2025 reporting she was doing okay.  She had been nonweightbearing and elevating and pain was improving.  She was off pain medication.  She continued on aspirin for DVT prophylaxis.  Pain was rated 4 out of 10.    I reviewed the operative procedure in detail with her and did not see any sign of infection or wound complication at that time.  She was having some sural nerve symptoms but reviewed with her that this was near the area of the plate application and had been identified at the time of surgery and was intact.  Did not see any sign of RSD.  Sutures removed and Steri-Strips were applied with sterile dressing she was placed into a well-padded cast and instructed to continue with elevation and nonweightbearing    Patient is seen back today reporting that her foot and ankle are \"okay.  She is off pain medication.  Actually did see her when I was out at a restaurant on 5/31/2025 and she had been out shopping quite a bit that day getting around to the point that her friend had even asked her if she should call me to see if she could be up as much as she had been.  She actually fell off her scooter that day when her wide leg pants got caught in it and landed on her foot with some increased lateral " "heel pain which has improved some.  Pain is rated at 3 out of  HPI    ROS: ankle pain  Past Medical History:   Diagnosis Date    Abnormal Pap smear of cervix     Acne     Acquired hypothyroidism     Anxiety     Cancer 09/2007    VULVAR CARCINOMA IN SITU    Colon polyp 2017    Found last colonoscopy    Depression     Endometriosis     GERD (gastroesophageal reflux disease) 2021    Hammertoe of right foot 04/24/2015    4TH RIGHT TOE    Hemorrhoids     Hip arthrosis 9 months    HPV (human papilloma virus) infection     Influenza A 02/06/2018    Lateral epicondylitis of right elbow 07/26/2013    Left knee pain 10/19/2017    SAW DR. GHASSAN COLE    Left ovarian cyst 08/2006    FOLLICLE CYST    Lesion of left plantar nerve     Menopause     Neuroma of foot 5 years    Surgery to correct    Onychomycosis 03/18/2014    Primary insomnia     Rheumatic fever     AS A CHILD    Right hip pain     Syncope 02/07/2018    SEEN AT [facility]    Thrombosed hemorrhoids 09/26/2017    SAW DR. FAUZIA GIRON    Varicella     Ventricular tachycardia        Physical Exam:   Vitals:    06/11/25 1018   Temp: 97.3 °F (36.3 °C)   Weight: 77.1 kg (170 lb)   Height: 170.2 cm (67\")   PainSc: 3      Well developed with normal mood.  On exam her hindfoot as well as dorsal midfoot and medial hindfoot incisions are healing without sign of infection.  She had paucity of sensation over the lateral aspect of the foot in the sural nerve distribution but grossly intact in the first webspace and no sign of RSD.  Calf is nontender without sign of DVT      Radiology: 3 views left foot ordered evaluate postoperative alignment reviewed and compared to previous x-rays show good alignment of the navicular over the talus.  Calcaneal osteotomy appears to be without change in alignment and hardware remains intact.  No clear evidence of change in alignment to the midfoot osteotomy the fourth heart July 2..  There is also there okay thank her wound       MRI films and " report of the left hindfoot dated 11/29/2022 reviewed which showed narrowing of the tarsal sinus posteriorly with loss of fat signal in that area and adjacent mild marrow edema of the calcaneus     Plantar lateral talar cortex abuts the dorsal cortex of the anterior calcaneal process.  Posterior tib tendon maintains normal signal and thickness with no tendon sheath effusion.  There is a 6 mm accessory ossicle within the substance of the distal posterior tib tendon.  Other ankle ligaments and tendons appeared intact at that articular cartilage.        Assessment/Plan: Status post left foot/ankle surgery as outlined above        1.  Left pes planus with sinus tarsi impingement  2.  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  Reviewed to her that the sural nerve should improve over time and if not would not expect any functional deficits and do not see any sign of RSD.    Steri-Strips removed and wounds were cleaned.  Sterile dressings were applied.  She was placed back into a very well-padded short leg nonweightbearing fiberglass cast in neutral dorsiflexion.  She will continue with elevation and nonweightbearing    Will see her back in 2 weeks with x-rays of her left foot out of her cast.

## 2025-06-26 ENCOUNTER — OFFICE VISIT (OUTPATIENT)
Dept: ORTHOPEDIC SURGERY | Facility: CLINIC | Age: 60
End: 2025-06-26
Payer: MEDICAID

## 2025-06-26 VITALS — HEIGHT: 67 IN | WEIGHT: 170 LBS | BODY MASS INDEX: 26.68 KG/M2 | TEMPERATURE: 96.8 F

## 2025-06-26 DIAGNOSIS — M21.40 PES PLANUS, UNSPECIFIED LATERALITY: ICD-10-CM

## 2025-06-26 DIAGNOSIS — M79.672 PAIN ASSOCIATED WITH ACCESSORY NAVICULAR BONE OF FOOT, LEFT: ICD-10-CM

## 2025-06-26 DIAGNOSIS — Q74.2 PAIN ASSOCIATED WITH ACCESSORY NAVICULAR BONE OF FOOT, LEFT: ICD-10-CM

## 2025-06-26 DIAGNOSIS — M25.572 SINUS TARSITIS OF LEFT FOOT: ICD-10-CM

## 2025-06-26 DIAGNOSIS — M76.829 POSTERIOR TIBIAL TENDON DYSFUNCTION: Primary | ICD-10-CM

## 2025-06-26 DIAGNOSIS — M25.872 IMPINGEMENT SYNDROME OF LEFT ANKLE: ICD-10-CM

## 2025-06-26 NOTE — PROGRESS NOTES
"Ankle Follow Up      Patient: Sakina Guerrier    YOB: 1965 60 y.o. female    Chief Complaints: Ankle doing okay    History of Present Illness:patient underwent left calcaneal medial displacement osteotomy with Arthrex step plate as well as excision of accessory navicular and secondary reconstruction/advancement of the posterior tib tendon and cotton osteotomy using cancellous wedge on 5/13/2025 for chronic pes planus with lateral impingement.  Please see note from 4/28/2025 for details.  She also had previous fifth metatarsal fracture treated operatively and subsequent hardware removal and bilateral third interdigital neuroma excisions performed previously by podiatry.     Patient was seen on 5/28/2025 reporting she was doing okay.  She had been nonweightbearing and elevating and pain was improving.  She was off pain medication.  She continued on aspirin for DVT prophylaxis.  Pain was rated 4 out of 10.     I reviewed the operative procedure in detail with her and did not see any sign of infection or wound complication at that time.  She was having some sural nerve symptoms but reviewed with her that this was near the area of the plate application and had been identified at the time of surgery and was intact.  Did not see any sign of RSD.  Sutures removed and Steri-Strips were applied with sterile dressing she was placed into a well-padded cast and instructed to continue with elevation and nonweightbearing     Patient was seen on 6/11/2025 reporting that her foot and ankle were \"okay \".  She was off pain medication.  Actually I did see her when I was out at a restaurant on 5/31/2025 and she had been out shopping quite a bit that day getting around to the point that her friend had even asked her if she should call me to see if she could be up as much as she had been.  She actually fell off her scooter that day when her wide leg pants got caught in it and landed on her foot with some increased lateral " heel pain which had improved some.  Pain was rated 3 out of 10.    Overall seem to be doing well and did not see any sign of any major complication from her fall but difficult to say for sure but nothing different I would do at that point.  Reviewed with her that the sural nerve symptoms she was having should improve over time and if not been expect any functional deficits or any sign of RSD.  Steri-Strips were removed and wounds were cleaned with sterile dressings applied she was placed into a well-padded fiberglass cast in neutral dorsiflexion with instructions to continue with elevation and nonweightbearing    Patient is seen back today reporting that she is doing okay.  Gets a bit of achy pain over the anterolateral ankle and still has a lack of sensation with the lateral foot.  She has been elevating and nonweightbearing.  Pain is rated at 2 out of 10  HPI    ROS: ankle pain  Past Medical History:   Diagnosis Date    Abnormal Pap smear of cervix     Acne     Acquired hypothyroidism     Anxiety     Cancer 09/2007    VULVAR CARCINOMA IN [facility]IT[facility]    Colon polyp 2017    Found last colonoscopy    Depression     Endometriosis     GERD (gastroesophageal reflux disease) 2021    Hammertoe of right foot 04/24/2015    4TH RIGHT TOE    Hemorrhoids     Hip arthrosis 9 months    HPV (human papilloma virus) infection     Influenza A 02/06/2018    Lateral epicondylitis of right elbow 07/26/2013    Left knee pain 10/19/2017    SAW DR. GHASSAN COLE    Left ovarian cyst 08/2006    FOLLICLE CYST    Lesion of left plantar nerve     Menopause     Neuroma of foot 5 years    Surgery to correct    Onychomycosis 03/18/2014    Primary insomnia     Rheumatic fever     AS A CHILD    Right hip pain     Syncope 02/07/2018    SEEN AT [facility]    Thrombosed hemorrhoids 09/26/2017    SAW DR. FAUZIA GIRON    Varicella     Ventricular tachycardia        Physical Exam:   Vitals:    06/26/25 1426   Temp: 96.8 °F (36 °C)   Weight: 77.1 kg (170 lb)  "  Height: 170.2 cm (67\")   PainSc: 2      Well developed with normal mood.  Incisions are healing without sign of infection she had good dorsiflexion and inversion strength but did not overstress this.  She was lacking sensation over the lateral aspect of left foot but no mottling or hyperesthesia.  She maintained good arch with neutral dorsiflexion.      Radiology: 3 views left ankle and 3 views left foot ordered evaluate postoperative alignment reviewed and compared to previous x-rays.    These show good alignment to the Achilles coverage of the talus without evidence of fracture easily.  There is good alignment of the first cuneiform osteotomy which appears to be healing there is also good alignment of the calcaneal osteotomy and hardware remains intact.    MRI films and report of the left hindfoot dated 11/29/2022 reviewed which showed narrowing of the tarsal sinus posteriorly with loss of fat signal in that area and adjacent mild marrow edema of the calcaneus     Plantar lateral talar cortex abuts the dorsal cortex of the anterior calcaneal process.  Posterior tib tendon maintains normal signal and thickness with no tendon sheath effusion.  There is a 6 mm accessory ossicle within the substance of the distal posterior tib tendon.  Other ankle ligaments and tendons appeared intact at that articular cartilage.        Assessment/Plan: Status post left foot/ankle surgery as outlined above       We discussed treatment going forward I do not see any sign of postoperative complications.  Reviewed that we did see that sural nerve at the time of surgery and it was near the plate but was intact and should improve over time and if not would not expect any functional deficit.    She will begin gentle range of motion exercises to the foot and ankle and will have her use a boot when she is up and about and use her power step orthotic in that.  She will do touchdown foot flat weightbearing for the next 2 weeks and we will get " her started in some physical therapy as well.  I will see her back in 2 weeks with x-rays of her left foot.  If she is doing well we will progress with weightbearing.     1.  Left pes planus with sinus tarsi impingement  2.  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

## 2025-07-01 ENCOUNTER — HOSPITAL ENCOUNTER (OUTPATIENT)
Dept: PHYSICAL THERAPY | Facility: HOSPITAL | Age: 60
Setting detail: THERAPIES SERIES
Discharge: HOME | End: 2025-07-01
Payer: MEDICAID

## 2025-07-01 DIAGNOSIS — M25.572 SINUS TARSITIS OF LEFT FOOT: ICD-10-CM

## 2025-07-01 DIAGNOSIS — M21.40 PES PLANUS, UNSPECIFIED LATERALITY: ICD-10-CM

## 2025-07-01 DIAGNOSIS — M25.872 IMPINGEMENT SYNDROME OF LEFT ANKLE: ICD-10-CM

## 2025-07-01 DIAGNOSIS — M76.829 POSTERIOR TIBIAL TENDON DYSFUNCTION: Primary | ICD-10-CM

## 2025-07-01 DIAGNOSIS — M79.672 PAIN ASSOCIATED WITH ACCESSORY NAVICULAR BONE OF FOOT, LEFT: ICD-10-CM

## 2025-07-01 DIAGNOSIS — Q74.2 PAIN ASSOCIATED WITH ACCESSORY NAVICULAR BONE OF FOOT, LEFT: ICD-10-CM

## 2025-07-01 PROCEDURE — 97110 THERAPEUTIC EXERCISES: CPT

## 2025-07-01 PROCEDURE — 97161 PT EVAL LOW COMPLEX 20 MIN: CPT

## 2025-07-01 NOTE — THERAPY EVALUATION
Outpatient Physical Therapy Ortho Initial Evaluation  [facility]     Patient Name: Sakina EDMONDS Depinto  : 1965  MRN: [id number]  Today's Date: 2025      Visit Date: 2025    Patient Active Problem List   Diagnosis    Insomnia    Hemorrhoids, external    Menopause syndrome    Primary osteoarthritis of right hip    Palpitations    Ventricular tachycardia, non-sustained    History of rheumatic fever as a child    Anxiety    Annual physical exam    Other hyperlipidemia    Pain associated with accessory navicular bone of foot, left    Posterior tibial tendon dysfunction    Pes planus    Impingement syndrome of left ankle    Sinus tarsitis of left foot    Gastroesophageal reflux disease without esophagitis    Overweight with body mass index (BMI) of 26 to 26.9 in adult        Past Medical History:   Diagnosis Date    Abnormal Pap smear of cervix     Acne     Acquired hypothyroidism     Anxiety     Cancer 2007    VULVAR CARCINOMA IN SITU    Colon polyp     Found last colonoscopy    Depression     Endometriosis     GERD (gastroesophageal reflux disease)     Hammertoe of right foot 2015    4TH RIGHT TOE    Hemorrhoids     Hip arthrosis 9 months    HPV (human papilloma virus) infection     Influenza A 2018    Lateral epicondylitis of right elbow 2013    Left knee pain 10/19/2017    SAW DR. GHASSAN COLE    Left ovarian cyst 2006    FOLLICLE CYST    Lesion of left plantar nerve     Menopause     Neuroma of foot 5 years    Surgery to correct    Onychomycosis 2014    Primary insomnia     Rheumatic fever     AS A CHILD    Right hip pain     Syncope 2018    SEEN AT [facility]    Thrombosed hemorrhoids 2017    SAW DR. FAUZIA GIRON    Varicella     Ventricular tachycardia         Past Surgical History:   Procedure Laterality Date    ANKLE TENDON REPAIR Left 2025    Procedure: Left posterior tib tendon debridement and repair.  Left calcaneal osteotomy and medial  midfoot osteotomy.;  Surgeon: Primo Pride MD;  Location: [facility];  Service: Orthopedics;  Laterality: Left;    BREAST SURGERY Bilateral 2005    AUGMENTATION MAM[facility]LAST[facility]     SECTION N/A     CHOLECYSTECTOMY N/A     DONE IN [place]    COLONOSCOPY N/A 2017    ENTIRE COLON WNL, RESCOPE IN 5 YRS, DR. FAUZIA GIRON [facility]    COLPOSCOPY W/ BIOPSY / CURETTAGE N/A 2007    BX OF PERIANAL LESION, PATH: SEVERE DYSPLASIA, CARCINOMA IN SITU, DR[facility]REBECCA MICHELLE [facility]    ENDOMETRIAL ABLATION N/A 2006    WITH EUA, LYSIS OF ADHESIONS, AND ASPIRATION OF LEFT OVARIAN FOLLICLE CYST, DR. TONY MICHELLE [facility]    FOOT SURGERY Left     plate and pen removal     JOINT REPLACEMENT  May 2022    R hip    LAPAROSCOPIC CHOLECYSTECTOMY      NEUROMA SURGERY Bilateral 02/10/2016    right third intermetatarsal space; [facility]; Lee James DPM    TOTAL HIP ARTHROPLASTY Right 2022    Procedure: Right anterior total hip arthroplasty;  Surgeon: Maciel Luis MD;  Location: [facility] OR;  Service: Orthopedics;  Laterality: Right;    WISDOM TOOTH EXTRACTION         Visit Dx:     ICD-10-CM ICD-9-CM   1. Posterior tibial tendon dysfunction  M76.829 734   2. Impingement syndrome of left ankle  M25.872 726.79   3. Sinus tarsitis of left foot  M25.572 726.79   4. Pes planus, unspecified laterality  M21.40 734   5. Pain associated with accessory navicular bone of foot, left  M79.672 729.5    Q74.2 755.67          Patient History       Row Name 25 1100             History    Chief Complaint Balance Problems;Difficulty Walking;Difficulty with daily activities;Pain;Muscle tenderness;Muscle weakness  -ER      Type of Pain Ankle pain;Foot pain  L foot/ankle  -ER      Date Current Problem(s) Began 10/24/21  Main VERÓNICA - Pes planus  -ER      Brief Description of Current Complaint Sakina Guerrier is a 60 y.o. female who presents to physical therapy today  with primary compliant of L ankle pain that began in 2021. Pt. Has dx of chronic pes planus on L foot/ankle. She is s/p left posterior tib tendon debridement and repair.  Left calcaneal osteotomy and medial midfoot osteotomy 05/13/2025. She is NWB in CAM boot, using knee scooter. She has hx of 5th met fx and bilateral third interdigital neuroma excision. 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 Pain relieving factors include NWB, and rest. Sakina Guerrier primary goal for attending skilled physical therapy is to improve mobility post operatively.  -ER      Previous treatment for THIS PROBLEM Surgery;Injections  PT, cortisone injections  -ER      Surgery Date: 05/13/25  -ER      Patient/Caregiver Goals Relieve pain;Return to prior level of function;Return to work;Improve mobility;Improve strength;Know what to do to help the symptoms;Heal wound  -ER      Occupation/sports/leisure activities Work is boutiqe, walk, pickleball  -ER      Patient seeing anyone else for problem(s)? Dr. Merino  -ER      How has patient tried to help current problem? Surgery, rest, NWB  -ER      What clinical tests have you had for this problem? X-ray;MRI  -ER      Results of Clinical Tests See EPIC  -ER      Related/Recent Hospitalizations No  -ER      Are you or can you be pregnant No  -ER         Pain     Pain Location Foot;Ankle  -ER      Pain at Present 4  -ER      Pain at Best 2  -ER      Pain at Worst 4  -ER      Pain Frequency Constant/continuous  with weightbearing  -ER      What Performance Factors Make the Current Problem(s) WORSE? weight bearing  -ER      What Performance Factors Make the Current Problem(s) BETTER? rest, NWB  -ER      Is your sleep disturbed? No  -ER      Difficulties at work? Being on her feet for long periods of time  -ER      Difficulties with ADL's? N/A  -ER      Difficulties with  recreational activities? pain limiting, NWB currently  -ER         Fall Risk Assessment    Any falls in the past year: Yes  -ER      Number of falls reported in the last 12 months 1  -ER      Factors that contributed to the fall: Other (comment)  Wearing wide leg pants, flipped over knee scooter  -ER         Services    Prior Rehab/Home Health Experiences No  -ER      Are you currently receiving Home Health services No  -ER      Do you plan to receive Home Health services in the near future No  -ER         Daily Activities    Primary Language English  -ER      Are you able to read Yes  -ER      Are you able to write Yes  -ER      How does patient learn best? Listening;Reading;Demonstration  -ER      Teaching needs identified Home Exercise Program;Management of Condition;Falls Prevention;Home Safety  -ER      Patient is concerned about/has problems with Climbing Stairs;Coordination;Difficulty with self care (i.e. bathing, dressing, toileting:;Performing home management (household chores, shopping, care of dependents);Performing job responsibilities/community activities (work, school,;Performing sports, recreation, and play activities;Sitting;Standing;Transfers (getting out of a chair, bed);Walking  -ER      Does patient have problems with the following? None  -ER      Barriers to learning None  -ER      Pt Participated in POC and Goals Yes  -ER         Safety    Are you being hurt, hit, or frightened by anyone at home or in your life? No  -ER      Are you being neglected by a caregiver No  -ER      Have you had any of the following issues with N/A  -ER                User Key  (r) = Recorded By, (t) = Taken By, (c) = Cosigned By      Initials Name Provider Type    ER Charley Calabrese PT Physical Therapist                     PT Ortho       Row Name 07/01/25 1200       Precautions and Contraindications    Precautions/Limitations non-weight bearing status  touch down flat foot, or NWB on scooter  -ER        Posture/Observations    Alignment Options Pes Planus  -ER    Posture/Observations Comments using knee scooter, with stance in L boot, slight L hip elevation and knee flexion to offload  -ER       Quarter Clearing    Quarter Clearing Lower Quarter Clearing  -ER       Sensory Screen for Light Touch- Lower Quarter Clearing    L1 (inguinal area) Bilateral:;Intact  -ER    L2 (anterior mid thigh) Bilateral:;Intact  -ER    L3 (distal anterior thigh) Bilateral:;Intact  -ER    L4 (medial lower leg/foot) Bilateral:;Intact  -ER    L5 (lateral lower leg/great toe) Left:;Diminished;Absent  -ER    S1 (bottom of foot) Bilateral:  -ER       Myotomal Screen- Lower Quarter Clearing    Hip flexion (L2) Bilateral:;5 (Normal)  -ER    Knee extension (L3) Bilateral:;5 (Normal)  -ER    Knee flexion (S2) Bilateral:;5 (Normal)  -ER       Special Tests/Palpation    Special Tests/Palpation Ankle/Foot  -ER       Foot/Ankle Palpation    Foot/Ankle Palpation? Yes  -ER    Lateral Malleolus Left:;Tender;Swollen  -ER    Medial Malleolus Left:;Tender;Swollen  -ER    Met Heads Left:;Swollen  -ER    Posterior Tibialis Left:;Tender  -ER    Metatarsals Left:;Swollen  -ER       General ROM    LT Lower Ext Lt Ankle Dorsiflexion;Lt Ankle Plantarflexion  -ER       Left Lower Ext    Lt Ankle Dorsiflexion AROM 5  -ER    Lt Ankle Plantarflexion AROM 35  -ER       MMT (Manual Muscle Testing)    Rt Lower Ext Rt Hip Flexion;Rt Knee Extension;Rt Knee Flexion  -ER    Lt Lower Ext Lt Knee Extension;Lt Knee Flexion;Lt Hip Flexion  -ER       MMT Right Lower Ext    Rt Hip Flexion MMT, Gross Movement (5/5) normal  -ER    Rt Knee Extension MMT, Gross Movement (5/5) normal  -ER    Rt Knee Flexion MMT, Gross Movement (5/5) normal  -ER       MMT Left Lower Ext    Lt Hip Flexion MMT, Gross Movement (5/5) normal  -ER    Lt Knee Extension MMT, Gross Movement (5/5) normal  -ER    Lt Knee Flexion MMT, Gross Movement (5/5) normal  -ER       Sensation    Additional Comments L sural  nerve distribution numb  -ER              User Key  (r) = Recorded By, (t) = Taken By, (c) = Cosigned By      Initials Name Provider Type    ER Charley Calabrese, PT Physical Therapist                                Therapy Education  Education Details: HEP, weight bearing status, use of scooter and boot, pain management techniques, rest, ice, elevation  Given: HEP, Symptoms/condition management, Pain management, Posture/body mechanics  Program: New  How Provided: Verbal, Demonstration, Written  Provided to: Patient  Level of Understanding: Teach back education performed, Verbalized, Demonstrated      PT OP Goals       Row Name 07/01/25 1200          PT Short Term Goals    STG Date to Achieve 07/31/25  -ER     STG 1 Pt will be independent and verbalized good understanding of initial HEP to improve ability to manage pain, as well as improve ankle strength and mobility.  -ER     STG 1 Progress New  -ER     STG 2 The pt will demonstrate L ankle strength to at least 4-/5 for improved functional strength and balance.  -ER     STG 2 Progress New  -ER     STG 3 The pt will demonstrate LLE SLS to at least 10 sec for improved stair navigation and balance.  -ER     STG 3 Progress New  -ER     STG 4 Pt. will report ability to stand/walk for 10 minutes with pain <4/10 to facilitate return to work activities  -ER     STG 4 Progress New  -ER        Long Term Goals    LTG Date to Achieve 08/30/25  -ER     LTG 1 The pt will demonstrate IND and compliant with progressive HEP focused on IND condition management and return to PLOF.  -ER     LTG 1 Progress New  -ER     LTG 2 The pt will ambulate over uneven ground for 20 min with pain no greater than 4/10 for return to recreational activities.  -ER     LTG 2 Progress New  -ER     LTG 3 The pt will demonstrate L ankle strength to at least 4+/5 for improved functional strength and balance.  -ER     LTG 3 Progress New  -ER     LTG 4 The pt will demonstrate LLE SLS to at least 20 sec for  improved stair navigation and balance.  -ER     LTG 4 Progress New  -ER     LTG 5 The pt will score greater than or equal to 68/84 ability on the FAAM to indicate improved perceived performance of ADLs.  -ER     LTG 5 Progress New  -ER        Time Calculation    PT Goal Re-Cert Due Date 09/29/25  -ER               User Key  (r) = Recorded By, (t) = Taken By, (c) = Cosigned By      Initials Name Provider Type    ER Charley Calabrese, PT Physical Therapist                     PT Assessment/Plan       Row Name 07/01/25 1200          PT Assessment    Functional Limitations Impaired gait;Impaired locomotion;Limitations in community activities;Limitations in functional capacity and performance;Performance in leisure activities;Performance in work activities  -ER     Impairments Balance;Edema;Endurance;Gait;Impaired muscle endurance;Joint mobility;Muscle strength;Pain;Range of motion;Sensation  -ER     Assessment Comments Sakina Guerrier is a 60 y.o. female referred to physical therapy for evaluation s/p Left posterior tib tendon debridement and repair.  Left calcaneal osteotomy and medial midfoot osteotomy 5/13/2025. Per MD note, she is touchdown foot flat Wb'ing with CAM boot, or NWB on knee scooter but encouraged to start short bouts of ambulation on LLE with CAM donned.  She is cleared for gentle ROM to L foot and ankle. She presents with a stable clinical presentation, along with  comorbidities of hx of 5th met fx and bilateral third interdigital neuroma excision.  and personal factors of chronic L ankle pain/active lifestyle that may impact her progress in the plan of care. Pt presents today with incisions appearing clean and intact. She has generalized edema along midfoot, medial and lateral malleoli and across metatarsal heads of L foot. She reports numbness along sural nerve distribution. MMT not performed to ankle and foot this date due to caution with ROM/strength at this time. She is 5/5 strength throughout R/L knee  ext/flex, hip flex/ext. She demonstrates ~15 ft of ambulation in CAM boot and demonstrates decreased weight shift to the L, with slight hip hike to clear L boot. Pain remains at 4/10, increases with Wb'ing. Her signs and symptoms are consistent with referring diagnosis. The previous impairments limit her ability to return to work or participate in leisure activities. Patients FAAM outcome measure, 41/84 where 84 is no difficulty. Pt will benefit from skilled PT to address the previous impairments and return to PLOF.  -ER     Please refer to paper survey for additional self-reported information No  -ER     Rehab Potential Good  -ER     Patient/caregiver participated in establishment of treatment plan and goals Yes  -ER     Patient would benefit from skilled therapy intervention Yes  -ER        PT Plan    PT Frequency 2x/week  -ER     Predicted Duration of Therapy Intervention (PT) 16-18 visits  -ER     Planned CPT's? PT EVAL LOW COMPLEXITY: 86338;PT RE-EVAL: 82442;PT THER PROC EA 15 MIN: 35690;PT THER ACT EA 15 MIN: 03578;PT MANUAL THERAPY EA 15 MIN: 56732;PT NEUROMUSC RE-EDUCATION EA 15 MIN: 57790;PT GAIT TRAINING EA 15 MIN: 76270;PT EVAL AQUA: 79111;PT SELF CARE/HOME MGMT/TRAIN EA 15: 53540;PT HOT OR COLD PACK TREAT MCARE;PT INITIAL ORTHOTIC MGMT/TRAIN EA 15 MIN: 42102;PT SELF CARE/MGMT/TRAIN 15 MIN: 67846;PT THER PROC GROUP: 80156;PT THER MASS EA 15 MIN: 97024;PT THER SUPP EA 15 MIN  -ER     PT Plan Comments Consider nustep warm up no resistance, hip ABD with TB/ADD, seated HR/TR. toe yoga, towel scruches, BAPs board, gentle DF stretch with strap, ankle AROM PF/DF/CW/CCW, gentle 4 way ankle, update on WB'ing status from Dr. Pride? Pt. is touch down flat foot in boot, encouraged to start bearing weight... may consider weight shifts to foam?  -ER               User Key  (r) = Recorded By, (t) = Taken By, (c) = Cosigned By      Initials Name Provider Type    ER Charley Calabrese, PT Physical Therapist                        OP Exercises       Row Name 07/01/25 1200             Precautions    Existing Precautions/Restrictions non-weight bearing  CAM boot  -ER         Subjective    Subjective Comments PT eval  -ER         Subjective Pain    Able to rate subjective pain? yes  -ER      Pre-Treatment Pain Level 4  -ER      Post-Treatment Pain Level 4  -ER         Total Minutes    80839 - PT Therapeutic Exercise Minutes 15  -ER         Exercise 2    Exercise Name 2 Seated heel slides on towel  -ER      Cueing 2 Verbal;Demo  -ER      Sets 2 1  -ER      Reps 2 10  -ER      Time 2 5sec  -ER         Exercise 3    Exercise Name 3 Seated HR/TR  -ER      Cueing 3 Verbal;Demo  -ER      Sets 3 1  -ER      Reps 3 10e  -ER         Exercise 4    Exercise Name 4 Towel scrunches  -ER      Cueing 4 Verbal;Demo  -ER      Sets 4 1  -ER      Reps 4 10  -ER      Time 4 5sec  -ER         Exercise 5    Exercise Name 5 Gentle ankle AROM, PF/DF/ circles CW/CCW  -ER      Cueing 5 Verbal;Demo  -ER      Sets 5 1  -ER      Reps 5 10  -ER      Time 5 each  -ER                User Key  (r) = Recorded By, (t) = Taken By, (c) = Cosigned By      Initials Name Provider Type    ER Charley Calabrese, PT Physical Therapist                                  Outcome Measure Options: FAAM  FAAM  FAAM: 41/84 (84 = no difficulty)      Time Calculation:     Start Time: 1130  Stop Time: 1215  Time Calculation (min): 45 min  Total Timed Code Minutes- PT: 15 minute(s)  Timed Charges  54984 - PT Therapeutic Exercise Minutes: 15  Untimed Charges  PT Eval/Re-eval Minutes: 30  Total Minutes  Timed Charges Total Minutes: 15  Untimed Charges Total Minutes: 30   Total Minutes: 45     Therapy Charges for Today       Code Description Service Date Service Provider Modifiers Qty    6931966 HC PT THER PROC EA 15 MIN 7/1/2025 Charley Calabrese, PT GP 1    [id number] HC PT EVAL LOW COMPLEXITY 2 7/1/2025 Charley Calabrese, PT GP 1            PT G-Codes  Outcome Measure Options: ROSS Calabrese  PT  7/1/2025

## 2025-07-03 ENCOUNTER — HOSPITAL ENCOUNTER (OUTPATIENT)
Dept: PHYSICAL THERAPY | Facility: HOSPITAL | Age: 60
Setting detail: THERAPIES SERIES
Discharge: HOME | End: 2025-07-03
Payer: MEDICAID

## 2025-07-03 DIAGNOSIS — M21.40 PES PLANUS, UNSPECIFIED LATERALITY: ICD-10-CM

## 2025-07-03 DIAGNOSIS — M25.572 SINUS TARSITIS OF LEFT FOOT: ICD-10-CM

## 2025-07-03 DIAGNOSIS — M79.672 PAIN ASSOCIATED WITH ACCESSORY NAVICULAR BONE OF FOOT, LEFT: ICD-10-CM

## 2025-07-03 DIAGNOSIS — M25.872 IMPINGEMENT SYNDROME OF LEFT ANKLE: ICD-10-CM

## 2025-07-03 DIAGNOSIS — M76.829 POSTERIOR TIBIAL TENDON DYSFUNCTION: Primary | ICD-10-CM

## 2025-07-03 DIAGNOSIS — Q74.2 PAIN ASSOCIATED WITH ACCESSORY NAVICULAR BONE OF FOOT, LEFT: ICD-10-CM

## 2025-07-03 PROCEDURE — 97110 THERAPEUTIC EXERCISES: CPT | Performed by: PHYSICAL THERAPIST

## 2025-07-03 NOTE — THERAPY TREATMENT NOTE
Outpatient Physical Therapy Ortho Treatment Note  [facility]     Patient Name: Sakina Guerrier  : 1965  MRN: [id number]  Today's Date: 7/3/2025      Visit Date: 2025    Visit Dx:    ICD-10-CM ICD-9-CM   1. Posterior tibial tendon dysfunction  M76.829 734   2. Impingement syndrome of left ankle  M25.872 726.79   3. Sinus tarsitis of left foot  M25.572 726.79   4. Pes planus, unspecified laterality  M21.40 734   5. Pain associated with accessory navicular bone of foot, left  M79.672 729.5    Q74.2 755.67       Patient Active Problem List   Diagnosis    Insomnia    Hemorrhoids, external    Menopause syndrome    Primary osteoarthritis of right hip    Palpitations    Ventricular tachycardia, non-sustained    History of rheumatic fever as a child    Anxiety    Annual physical exam    Other hyperlipidemia    Pain associated with accessory navicular bone of foot, left    Posterior tibial tendon dysfunction    Pes planus    Impingement syndrome of left ankle    Sinus tarsitis of left foot    Gastroesophageal reflux disease without esophagitis    Overweight with body mass index (BMI) of 26 to 26.9 in adult        Past Medical History:   Diagnosis Date    Abnormal Pap smear of cervix     Acne     Acquired hypothyroidism     Anxiety     Cancer 2007    VULVAR CARCINOMA IN SITU    Colon polyp     Found last colonoscopy    Depression     Endometriosis     GERD (gastroesophageal reflux disease)     Hammertoe of right foot 2015    4TH RIGHT TOE    Hemorrhoids     Hip arthrosis 9 months    HPV (human papilloma virus) infection     Influenza A 2018    Lateral epicondylitis of right elbow 2013    Left knee pain 10/19/2017    SAW DR. GHASSAN COLE    Left ovarian cyst 2006    FOLLICLE CYST    Lesion of left plantar nerve     Menopause     Neuroma of foot 5 years    Surgery to correct    Onychomycosis 2014    Primary insomnia     Rheumatic fever     AS A CHILD    Right hip pain      Syncope 2018    SEEN AT [facility]    Thrombosed hemorrhoids 2017    SAW DR. FAUZIA GIRON    Varicella     Ventricular tachycardia         Past Surgical History:   Procedure Laterality Date    ANKLE TENDON REPAIR Left 2025    Procedure: Left posterior tib tendon debridement and repair.  Left calcaneal osteotomy and medial midfoot osteotomy.;  Surgeon: Primo Pride MD;  Location: [facility];  Service: Orthopedics;  Laterality: Left;    BREAST SURGERY Bilateral 2005    AUGMENTATION MAMMOPLASTY, DR. TONY FERGUSON AT [facility]     SECTION N/A     CHOLECYSTECTOMY N/A     DONE IN [place]    COLONOSCOPY N/A 2017    ENTIRE COLON WNL, RESCOPE IN 5 YRS, DR. FAUZIA GIRON AT [facility]    COLPOSCOPY W/ BIOPSY / CURETTAGE N/A 2007    BX OF PERIANAL LESION, PATH: SEVERE DYSPLASIA, CARCINOMA IN SITU, DR.REBECCA MICHELLE AT [facility]    ENDOMETRIAL ABLATION N/A 2006    WITH EUA, LYSIS OF ADHESIONS, AND ASPIRATION OF LEFT OVARIAN FOLLICLE CYST, DR. TONY MICHELLE AT [facility]    FOOT SURGERY Left     plate and pen removal     JOINT REPLACEMENT  May 2022    R hip    LAPAROSCOPIC CHOLECYSTECTOMY  2016    NEUROMA SURGERY Bilateral 02/10/2016    right third intermetatarsal space; [facility]; Lee James DPM    TOTAL HIP ARTHROPLASTY Right 2022    Procedure: Right anterior total hip arthroplasty;  Surgeon: Maciel Luis MD;  Location: [facility];  Service: Orthopedics;  Laterality: Right;    WISDOM TOOTH EXTRACTION                          PT Assessment/Plan       Row Name 25 1314          PT Assessment    Assessment Comments Ms. Guerrier returns to the clinic for her first follow up session. SHe is 7 week, 2 days s/p L posterir tib tendon debridement and repair, L calcaneal osteotoy and medial midfoot osteotomy. She is touch down flat foot in boot weight bearing. She presents to the clinic in CAM boot on L, ,on scooter. Initaited session on Nustep (out of boot,  L 3).  Initiated ROM/gentle strengthening and proprioception activities for the L ankle/LE.  Updated HEP accourdingly.  She demosntrates well healing incisions. and well controlled/minimal swelling. Reviewed antaomy of the ankle and physiology of healing. Reviewed precatuions following her procedure. Kevin Guerrier continues to be a good candidate for skilled physical therapy.  -GJ        PT Plan    PT Plan Comments assess response to progression of exercises.  Assess MD visit on 7/10 ? any new restricsitons/lifing of restrictions. Warm up on nustep, ? bridges overe swiss ball, consider gentle resisted ankle DF, inversion, eversion with YTB  -GJ               User Key  (r) = Recorded By, (t) = Taken By, (c) = Cosigned By      Initials Name Provider Type    Iván Elizabeth, PT Physical Therapist                       OP Exercises       Row Name 07/03/25 1313 07/03/25 1300          Precautions    Existing Precautions/Restrictions -- non-weight bearing   foot flat in cam boot  -GJ        Subjective    Subjective Comments -- doing ok  -GJ        Subjective Pain    Pre-Treatment Pain Level -- 3  -GJ        Total Minutes    80159 - PT Therapeutic Exercise Minutes 40  -GJ --        Exercise 1    Exercise Name 1 -- Nustep  -GJ     Time 1 -- 5 min  -GJ     Additional Comments -- UE/LE, L 3, out of boot  -GJ        Exercise 3    Exercise Name 3 -- Seated HR/TR  -GJ     Cueing 3 -- Verbal;Demo  -GJ     Sets 3 -- 2  -GJ     Reps 3 -- 10  -GJ     Time 3 -- each  -GJ        Exercise 4    Exercise Name 4 -- Towel scrunches  -GJ     Cueing 4 -- Verbal;Demo  -GJ     Time 4 -- 2 min  -GJ        Exercise 5    Exercise Name 5 -- ankle circles, CW, CCW  -GJ     Cueing 5 -- Verbal;Demo  -GJ     Sets 5 -- 2  -GJ     Reps 5 -- 10  -GJ     Time 5 -- each  -GJ        Exercise 6    Exercise Name 6 -- BAPS  -GJ     Cueing 6 -- Verbal;Demo  -GJ     Reps 6 -- 15 each  -GJ     Time 6 -- A/P, M/L, CW, CCW  -GJ     Additional Comments -- seated   -GJ        Exercise 7    Exercise Name 7 -- toe yoga (great toe up, 2-5 down/reverse)  -GJ     Cueing 7 -- Verbal;Demo  -GJ     Reps 7 -- 5  -GJ     Time 7 -- 5s each  -GJ        Exercise 8    Exercise Name 8 -- SL clam  -GJ     Cueing 8 -- Verbal;Demo  -GJ     Sets 8 -- 2  -GJ     Reps 8 -- 10  -GJ     Time 8 -- GTB  -GJ        Exercise 9    Exercise Name 9 -- long sit HS/gastroc stretch  -GJ     Cueing 9 -- Verbal;Demo  -GJ     Reps 9 -- 3  -GJ     Time 9 -- 20s  -GJ               User Key  (r) = Recorded By, (t) = Taken By, (c) = Cosigned By      Initials Name Provider Type    Iván Elizabeth, PT Physical Therapist                                  PT OP Goals       Row Name 07/03/25 1300          PT Short Term Goals    STG Date to Achieve 07/31/25  -GJ     STG 1 Pt will be independent and verbalized good understanding of initial HEP to improve ability to manage pain, as well as improve ankle strength and mobility.  -GJ     STG 1 Progress Ongoing  -GJ     STG 2 The pt will demonstrate L ankle strength to at least 4-/5 for improved functional strength and balance.  -GJ     STG 2 Progress Ongoing  -GJ     STG 3 The pt will demonstrate LLE SLS to at least 10 sec for improved stair navigation and balance.  -GJ     STG 3 Progress Ongoing  -GJ     STG 4 Pt. will report ability to stand/walk for 10 minutes with pain <4/10 to facilitate return to work activities  -GJ     STG 4 Progress Ongoing  -GJ        Long Term Goals    LTG Date to Achieve 08/30/25  -GJ     LTG 1 The pt will demonstrate IND and compliant with progressive HEP focused on IND condition management and return to PLOF.  -GJ     LTG 1 Progress Ongoing  -GJ     LTG 2 The pt will ambulate over uneven ground for 20 min with pain no greater than 4/10 for return to recreational activities.  -GJ     LTG 2 Progress Ongoing  -GJ     LTG 3 The pt will demonstrate L ankle strength to at least 4+/5 for improved functional strength and balance.  -GJ     LTG 3  Progress Ongoing  -GJ     LTG 4 The pt will demonstrate LLE SLS to at least 20 sec for improved stair navigation and balance.  -GJ     LTG 4 Progress Ongoing  -GJ     LTG 5 The pt will score greater than or equal to 68/84 ability on the FAAM to indicate improved perceived performance of ADLs.  -     LTG 5 Progress Ongoing  -GJ               User Key  (r) = Recorded By, (t) = Taken By, (c) = Cosigned By      Initials Name Provider Type     Iván Resendiz, PT Physical Therapist                    Therapy Education  Education Details: [id number], reviewed activity modifications, reviewed restrictions per MD/surgery  Given: HEP, Symptoms/condition management, Pain management, Posture/body mechanics, Fall prevention and home safety, Mobility training  Program: Reinforced, New, Progressed, Modified  How Provided: Verbal, Demonstration, Written  Provided to: Patient  Level of Understanding: Teach back education performed, Verbalized, Demonstrated              Time Calculation:   Start Time: 1315  Stop Time: 1400  Time Calculation (min): 45 min  Timed Charges  08365 - PT Therapeutic Exercise Minutes: 40  Total Minutes  Timed Charges Total Minutes: 40   Total Minutes: 40  Therapy Charges for Today       Code Description Service Date Service Provider Modifiers Qty    [id number]  PT THER PROC EA 15 MIN 7/3/2025 Iván Resendiz, PT GP 3                      Iván Resendiz PT  7/3/2025

## 2025-07-08 ENCOUNTER — APPOINTMENT (OUTPATIENT)
Dept: PHYSICAL THERAPY | Facility: HOSPITAL | Age: 60
End: 2025-07-08
Payer: MEDICAID

## 2025-07-10 ENCOUNTER — HOSPITAL ENCOUNTER (OUTPATIENT)
Dept: PHYSICAL THERAPY | Facility: HOSPITAL | Age: 60
Setting detail: THERAPIES SERIES
Discharge: HOME | End: 2025-07-10
Payer: MEDICAID

## 2025-07-10 ENCOUNTER — OFFICE VISIT (OUTPATIENT)
Dept: ORTHOPEDIC SURGERY | Facility: CLINIC | Age: 60
End: 2025-07-10
Payer: MEDICAID

## 2025-07-10 VITALS — WEIGHT: 170 LBS | BODY MASS INDEX: 26.68 KG/M2 | TEMPERATURE: 96.9 F | HEIGHT: 67 IN

## 2025-07-10 DIAGNOSIS — Q74.2 PAIN ASSOCIATED WITH ACCESSORY NAVICULAR BONE OF FOOT, LEFT: ICD-10-CM

## 2025-07-10 DIAGNOSIS — M76.829 POSTERIOR TIBIAL TENDON DYSFUNCTION: Primary | ICD-10-CM

## 2025-07-10 DIAGNOSIS — M25.872 IMPINGEMENT SYNDROME OF LEFT ANKLE: ICD-10-CM

## 2025-07-10 DIAGNOSIS — M79.672 LEFT FOOT PAIN: ICD-10-CM

## 2025-07-10 DIAGNOSIS — R52 PAIN: ICD-10-CM

## 2025-07-10 DIAGNOSIS — M21.40 PES PLANUS, UNSPECIFIED LATERALITY: ICD-10-CM

## 2025-07-10 DIAGNOSIS — M79.672 PAIN ASSOCIATED WITH ACCESSORY NAVICULAR BONE OF FOOT, LEFT: ICD-10-CM

## 2025-07-10 DIAGNOSIS — M25.572 SINUS TARSITIS OF LEFT FOOT: ICD-10-CM

## 2025-07-10 DIAGNOSIS — M25.572 CHRONIC PAIN OF LEFT ANKLE: ICD-10-CM

## 2025-07-10 DIAGNOSIS — G89.29 CHRONIC PAIN OF LEFT ANKLE: ICD-10-CM

## 2025-07-10 DIAGNOSIS — G47.8 DIFFICULTY WAKING: ICD-10-CM

## 2025-07-10 PROCEDURE — 97116 GAIT TRAINING THERAPY: CPT

## 2025-07-10 PROCEDURE — 97110 THERAPEUTIC EXERCISES: CPT

## 2025-07-10 NOTE — PROGRESS NOTES
"Ankle Follow Up      Patient: Sakina Guerrier    YOB: 1965 60 y.o. female    Chief Complaints: Foot still hurts but improving    History of Present Illness:patient underwent left calcaneal medial displacement osteotomy with Arthrex step plate as well as excision of accessory navicular and secondary reconstruction/advancement of the posterior tib tendon and cotton osteotomy using cancellous wedge on 5/13/2025 for chronic pes planus with lateral impingement.  Please see note from 4/28/2025 for details.  She also had previous fifth metatarsal fracture treated operatively and subsequent hardware removal and bilateral third interdigital neuroma excisions performed previously by podiatry.     Patient was seen on 5/28/2025 reporting she was doing okay.  She had been nonweightbearing and elevating and pain was improving.  She was off pain medication.  She continued on aspirin for DVT prophylaxis.  Pain was rated 4 out of 10.     I reviewed the operative procedure in detail with her and did not see any sign of infection or wound complication at that time.  She was having some sural nerve symptoms but reviewed with her that this was near the area of the plate application and had been identified at the time of surgery and was intact.  Did not see any sign of RSD.  Sutures removed and Steri-Strips were applied with sterile dressing she was placed into a well-padded cast and instructed to continue with elevation and nonweightbearing     Patient was seen on 6/11/2025 reporting that her foot and ankle were \"okay \".  She was off pain medication.  Actually I did see her when I was out at a restaurant on 5/31/2025 and she had been out shopping quite a bit that day getting around to the point that her friend had even asked her if she should call me to see if she could be up as much as she had been.  She actually fell off her scooter that day when her wide leg pants got caught in it and landed on her foot with some " increased lateral heel pain which had improved some.  Pain was rated 3 out of 10.     Overall seem to be doing well and did not see any sign of any major complication from her fall but difficult to say for sure but nothing different I would do at that point.  Reviewed with her that the sural nerve symptoms she was having should improve over time and if not been expect any functional deficits or any sign of RSD.  Steri-Strips were removed and wounds were cleaned with sterile dressings applied she was placed into a well-padded fiberglass cast in neutral dorsiflexion with instructions to continue with elevation and nonweightbearing     Patient was seen on 6/26/2025 reporting that she was doing okay.  She reported that she got a bit of achy pain over the anterolateral ankle and stil had a lack of sensation with the lateral foot.  She had been elevating and nonweightbearing.  Pain was rated at 2 out of 10.    At that time did not see any obvious sign of postoperative complication and reviewed that sural nerve was intact at the time of surgery but was near the plate and should improve over time.  She was advised to begin gentle range of motion exercises to the foot and ankle and use boot when she was up and about with power step orthotic.  She was going to do touchdown foot flat weightbearing for 2 weeks get started into physical therapy.    Patient is seen back today reporting that she still has some pain in the lateral aspect of her foot but really not medially but is improving.  She has done some limited walking in her boot without her crutch.  She reports physical therapy is helping and has been working on stretching exercises.  Still has altered sensation in the lateral aspect of the left foot.  She is not using any orthotic in her boot.  HPI    ROS: ankle pain  Past Medical History:   Diagnosis Date    Abnormal Pap smear of cervix     Acne     Acquired hypothyroidism     Anxiety     Cancer 09/2007    VULVAR  "CARCINOMA IN SITU    Colon polyp 2017    Found last colonoscopy    Depression     Endometriosis     GERD (gastroesophageal reflux disease) 2021    Hammertoe of right foot 04/24/2015    4TH RIGHT TOE    Hemorrhoids     Hip arthrosis 9 months    HPV (human papilloma virus) infection     Influenza A 02/06/2018    Lateral epicondylitis of right elbow 07/26/2013    Left knee pain 10/19/2017    SAW DR. GHASSAN COLE    Left ovarian cyst 08/2006    FOLLICLE CYST    Lesion of left plantar nerve     Menopause     Neuroma of foot 5 years    Surgery to correct    Onychomycosis 03/18/2014    Primary insomnia     Rheumatic fever     AS A CHILD    Right hip pain     Syncope 02/07/2018    SEEN AT [facility]    Thrombosed hemorrhoids 09/26/2017    SAW DR. FAUZIA GIRON    Varicella     Ventricular tachycardia        Physical Exam:   Vitals:    07/10/25 1025   Temp: 96.9 °F (36.1 °C)   Weight: 77.1 kg (170 lb)   Height: 170.2 cm (67\")   PainSc: 3      Well developed with normal mood.  On exam systems are well-healed she was nontender over the medial ankle had good inversion strength.  She is nontender dorsum of the midfoot near the medial cuneiform osteotomy there was still loss of sensation over the sural nerve distribution of the lateral hindfoot midfoot but no RSD.      Radiology: 3 views left foot ordered evaluate postoperative alignment reviewed and compared to previous x-ray these show good alignment of the covering the talus and the medial cuneiform osteotomy appears to be healing.  There is good alignment to the midfoot and to the calcaneal osteotomy hardware is intact.    MRI films and report of the left hindfoot dated 11/29/2022 reviewed which showed narrowing of the tarsal sinus posteriorly with loss of fat signal in that area and adjacent mild marrow edema of the calcaneus     Plantar lateral talar cortex abuts the dorsal cortex of the anterior calcaneal process.  Posterior tib tendon maintains normal signal and thickness " with no tendon sheath effusion.  There is a 6 mm accessory ossicle within the substance of the distal posterior tib tendon.  Other ankle ligaments and tendons appeared intact at that articular cartilage.        Assessment/Plan: Status post left foot/ankle surgery as outlined above    1.  Left pes planus with sinus tarsi impingement  2.  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  We discussed treatment going forward and thankfully do not see any sign of RSD nerve should improve over time but if not would not expect any functional deficit    Will have her get a power step orthotic and she was fitted with a new boot today as her other 1 was an old black and was fairly worn out and she said it actually felt much better.  Never use a power step orthotic in this and with the boot may do touchdown weightbearing for the next 10 days or so and if improving then may progress up to about 50% weightbearing and continue with crutches.    Anything worsens to let me know otherwise I will see her back in 3 weeks x-rays of her left foot.

## 2025-07-10 NOTE — THERAPY TREATMENT NOTE
Outpatient Physical Therapy Ortho Treatment Note  [facility]     Patient Name: Sakina Guerrier  : 1965  MRN: [id number]  Today's Date: 7/10/2025      Visit Date: 07/10/2025    Visit Dx:    ICD-10-CM ICD-9-CM   1. Posterior tibial tendon dysfunction  M76.829 734   2. Impingement syndrome of left ankle  M25.872 726.79   3. Sinus tarsitis of left foot  M25.572 726.79   4. Pes planus, unspecified laterality  M21.40 734   5. Pain associated with accessory navicular bone of foot, left  M79.672 729.5    Q74.2 755.67   6. Chronic pain of left ankle  M25.572 719.47    G89.29 338.29   7. Left foot pain  M79.672 729.5   8. Difficulty waking  G47.8 780.59       Patient Active Problem List   Diagnosis    Insomnia    Hemorrhoids, external    Menopause syndrome    Primary osteoarthritis of right hip    Palpitations    Ventricular tachycardia, non-sustained    History of rheumatic fever as a child    Anxiety    Annual physical exam    Other hyperlipidemia    Pain associated with accessory navicular bone of foot, left    Posterior tibial tendon dysfunction    Pes planus    Impingement syndrome of left ankle    Sinus tarsitis of left foot    Gastroesophageal reflux disease without esophagitis    Overweight with body mass index (BMI) of 26 to 26.9 in adult        Past Medical History:   Diagnosis Date    Abnormal Pap smear of cervix     Acne     Acquired hypothyroidism     Anxiety     Cancer 2007    VULVAR CARCINOMA IN SITU    Colon polyp     Found last colonoscopy    Depression     Endometriosis     GERD (gastroesophageal reflux disease)     Hammertoe of right foot 2015    4TH RIGHT TOE    Hemorrhoids     Hip arthrosis 9 months    HPV (human papilloma virus) infection     Influenza A 2018    Lateral epicondylitis of right elbow 2013    Left knee pain 10/19/2017    SAW DR. GHASSAN COLE    Left ovarian cyst 2006    FOLLICLE CYST    Lesion of left plantar nerve     Menopause     Neuroma of  foot 5 years    Surgery to correct    Onychomycosis 2014    Primary insomnia     Rheumatic fever     AS A CHILD    Right hip pain     Syncope 2018    SEEN AT [facility]    Thrombosed hemorrhoids 2017    SAW DR. FAUZIA GIRON    Varicella     Ventricular tachycardia         Past Surgical History:   Procedure Laterality Date    ANKLE TENDON REPAIR Left 2025    Procedure: Left posterior tib tendon debridement and repair.  Left calcaneal osteotomy and medial midfoot osteotomy.;  Surgeon: Primo Pride MD;  Location: [facility];  Service: Orthopedics;  Laterality: Left;    BREAST SURGERY Bilateral 2005    AUGMENTATION MAMMOPLASTY, DR. TONY FERGUSON AT [facility]     SECTION N/A     CHOLECYSTECTOMY N/A     DONE IN [place]    COLONOSCOPY N/A 2017    ENTIRE COLON WNL, RESCOPE IN 5 YRS, DR. FAUZIA GIRON AT [facility]    COLPOSCOPY W/ BIOPSY / CURETTAGE N/A 2007    BX OF PERIANAL LESION, PATH: SEVERE DYSPLASIA, CARCINOMA IN SITU, DR.REBECCA MICHELLE AT [facility]    ENDOMETRIAL ABLATION N/A 2006    WITH EUA, LYSIS OF ADHESIONS, AND ASPIRATION OF LEFT OVARIAN FOLLICLE CYST, DR. TONY MICHELLE AT [facility]    FOOT SURGERY Left     plate and pen removal     JOINT REPLACEMENT  May 2022    R hip    LAPAROSCOPIC CHOLECYSTECTOMY  2016    NEUROMA SURGERY Bilateral 02/10/2016    right third intermetatarsal space; [facility]; Lee James DPM    TOTAL HIP ARTHROPLASTY Right 2022    Procedure: Right anterior total hip arthroplasty;  Surgeon: Maciel Luis MD;  Location: [facility]  Service: Orthopedics;  Laterality: Right;    WISDOM TOOTH EXTRACTION                          PT Assessment/Plan       Row Name 07/10/25 1100          PT Assessment    Assessment Comments Sakina is now 8w 2d s/p L posterior tibb tendon debridgement and repair, L calcaneal osteotomy, and medial midfoot osteotomy. She returned to MD today where he is satisfied with healing. MD did not raise  restrictions so she remains touch down flat foot WB in boot. She is able to transition from scooter to either crutches or walker, and requests to learn how to ambulate using crutches with WB restrictions. Spent inc time learning 3 point gait pattern, which pt did have some difficulty with as far as coordination of movement. With increased repetitions, she was able to demonstrate improved carryover. Initiated supine bridge over SB to progress hip girdle strength as well. Pt will transition to crutches at this time and we will continue focusing on gait training pending independence. Ms. Guerrier remains appropriate for skilled PT at this time.  -DR        PT Plan    PT Plan Comments continue gait training with crutches pending how pt has done indep, consider gentle resisted ankle 3 way YTB (DF,IV, EV)  -               User Key  (r) = Recorded By, (t) = Taken By, (c) = Cosigned By      Initials Name Provider Type    Lawson Sepulveda, PT Physical Therapist                       OP Exercises       Row Name 07/10/25 1100             Subjective    Subjective Comments MD appt went well. No changes in lifting of restrictions. I see him back in 3 weeks.  -         Subjective Pain    Able to rate subjective pain? yes  -DR      Pre-Treatment Pain Level 3  -DR         Total Minutes    28479 - Gait Training Minutes  8  -DR      54517 - PT Therapeutic Exercise Minutes 32  -DR         Exercise 1    Exercise Name 1 Nustep  -DR      Time 1 5 min  -DR      Additional Comments UE/LE, L4, out of boot  -DR         Exercise 2    Exercise Name 2 bridges over swiss ball  -DR      Cueing 2 Verbal;Demo  -DR      Sets 2 2  -DR      Reps 2 10  -DR      Additional Comments green SB  -DR         Exercise 3    Exercise Name 3 Seated HR/TR  -DR      Cueing 3 Verbal;Demo  -DR      Sets 3 2  -DR      Reps 3 10  -DR         Exercise 4    Exercise Name 4 Towel scrunches  -DR      Cueing 4 Verbal;Demo  -DR      Time 4 2 min  -DR         Exercise 5     Exercise Name 5 ankle circles, CW, CCW  -DR      Cueing 5 Verbal;Demo  -DR      Sets 5 2  -DR      Reps 5 10  -DR      Time 5 each  -DR         Exercise 6    Exercise Name 6 BAPS  -DR      Cueing 6 Verbal;Demo  -DR      Reps 6 15 each  -DR      Time 6 A/P, M/L, CW, CCW  -DR      Additional Comments seataed  -DR         Exercise 7    Exercise Name 7 toe yoga (great toe up, 2-5 down/reverse)  -DR      Cueing 7 Verbal;Demo  -DR      Reps 7 5  -DR      Time 7 5s each  -DR         Exercise 8    Exercise Name 8 SL clam  -DR      Cueing 8 Verbal;Demo  -DR      Sets 8 2  -DR      Reps 8 10  -DR      Time 8 GTB  -DR         Exercise 9    Exercise Name 9 long sit HS/gastroc stretch  -DR      Cueing 9 Verbal;Demo  -DR      Reps 9 3  -DR      Time 9 20s  -DR         Exercise 10    Exercise Name 10 crutch training with 3 point pattern  -DR      Time 10 8 min  -DR      Additional Comments copious cues for coordination of L foot and crutches  -DR                User Key  (r) = Recorded By, (t) = Taken By, (c) = Cosigned By      Initials Name Provider Type    Lawson Sepulveda, PT Physical Therapist                                  PT OP Goals       Row Name 07/10/25 1100          PT Short Term Goals    STG Date to Achieve 07/31/25  -     STG 1 Pt will be independent and verbalized good understanding of initial HEP to improve ability to manage pain, as well as improve ankle strength and mobility.  -     STG 1 Progress Ongoing  -     STG 2 The pt will demonstrate L ankle strength to at least 4-/5 for improved functional strength and balance.  -     STG 2 Progress Ongoing  -     STG 3 The pt will demonstrate LLE SLS to at least 10 sec for improved stair navigation and balance.  -     STG 3 Progress Ongoing  -     STG 4 Pt. will report ability to stand/walk for 10 minutes with pain <4/10 to facilitate return to work activities  -     STG 4 Progress Ongoing  -DR        Long Term Goals    LTG Date to Achieve  08/30/25  -     LTG 1 The pt will demonstrate IND and compliant with progressive HEP focused on IND condition management and return to PLOF.  -     LTG 1 Progress Ongoing  -     LTG 2 The pt will ambulate over uneven ground for 20 min with pain no greater than 4/10 for return to recreational activities.  -     LTG 2 Progress Ongoing  -     LTG 3 The pt will demonstrate L ankle strength to at least 4+/5 for improved functional strength and balance.  -DR PEACEG 3 Progress Ongoing  -     LTG 4 The pt will demonstrate LLE SLS to at least 20 sec for improved stair navigation and balance.  -     LTG 4 Progress Ongoing  -     LTG 5 The pt will score greater than or equal to 68/84 ability on the FAAM to indicate improved perceived performance of ADLs.  -DR PEACEG 5 Progress Ongoing  -               User Key  (r) = Recorded By, (t) = Taken By, (c) = Cosigned By      Initials Name Provider Type    Lawson Sepulveda, PT Physical Therapist                    Therapy Education  Education Details: gait training with crutches using 3 point pattern  Given: Mobility training, Fall prevention and home safety  Program: New  How Provided: Verbal, Demonstration  Provided to: Patient  Level of Understanding: Teach back education performed, Demonstrated, Verbalized              Time Calculation:   Start Time: 1145  Stop Time: 1225  Time Calculation (min): 40 min  Timed Charges  44922 - PT Therapeutic Exercise Minutes: 32  29237 - Gait Training Minutes : 8  Total Minutes  Timed Charges Total Minutes: 40   Total Minutes: 40  Therapy Charges for Today       Code Description Service Date Service Provider Modifiers Qty    99787048245 HC PT THER PROC EA 15 MIN 7/10/2025 Lawson Pink, PT GP 2    [id number] HC GAIT TRAINING EA 15 MIN 7/10/2025 Lawson Pink, PT GP 1                      Lawson Pink PT  7/10/2025

## 2025-07-11 DIAGNOSIS — F41.9 ANXIETY: Chronic | ICD-10-CM

## 2025-07-11 DIAGNOSIS — K21.9 GASTROESOPHAGEAL REFLUX DISEASE WITHOUT ESOPHAGITIS: Chronic | ICD-10-CM

## 2025-07-11 RX ORDER — PAROXETINE 40 MG/1
40 TABLET, FILM COATED ORAL EVERY MORNING
Qty: 90 TABLET | Refills: 0 | Status: SHIPPED | OUTPATIENT
Start: 2025-07-11

## 2025-07-11 RX ORDER — OMEPRAZOLE 40 MG/1
40 CAPSULE, DELAYED RELEASE ORAL DAILY
Qty: 90 CAPSULE | Refills: 2 | OUTPATIENT
Start: 2025-07-11

## 2025-07-11 NOTE — TELEPHONE ENCOUNTER
"Relay     \"You are due to have your annual physical done. Please schedule an appointment. Make sure to fast 8 hours prior to your appointment if you can, if you can not, we can do labs a different day.\"  "

## 2025-07-15 ENCOUNTER — APPOINTMENT (OUTPATIENT)
Dept: PHYSICAL THERAPY | Facility: HOSPITAL | Age: 60
End: 2025-07-15
Payer: MEDICAID

## 2025-07-17 ENCOUNTER — HOSPITAL ENCOUNTER (OUTPATIENT)
Dept: PHYSICAL THERAPY | Facility: HOSPITAL | Age: 60
Setting detail: THERAPIES SERIES
Discharge: HOME | End: 2025-07-17
Payer: MEDICAID

## 2025-07-17 DIAGNOSIS — M79.672 PAIN ASSOCIATED WITH ACCESSORY NAVICULAR BONE OF FOOT, LEFT: ICD-10-CM

## 2025-07-17 DIAGNOSIS — M25.572 CHRONIC PAIN OF LEFT ANKLE: ICD-10-CM

## 2025-07-17 DIAGNOSIS — G89.29 CHRONIC PAIN OF LEFT ANKLE: ICD-10-CM

## 2025-07-17 DIAGNOSIS — M79.672 LEFT FOOT PAIN: ICD-10-CM

## 2025-07-17 DIAGNOSIS — M25.872 IMPINGEMENT SYNDROME OF LEFT ANKLE: ICD-10-CM

## 2025-07-17 DIAGNOSIS — M21.40 PES PLANUS, UNSPECIFIED LATERALITY: ICD-10-CM

## 2025-07-17 DIAGNOSIS — M25.572 SINUS TARSITIS OF LEFT FOOT: ICD-10-CM

## 2025-07-17 DIAGNOSIS — M76.829 POSTERIOR TIBIAL TENDON DYSFUNCTION: Primary | ICD-10-CM

## 2025-07-17 DIAGNOSIS — Q74.2 PAIN ASSOCIATED WITH ACCESSORY NAVICULAR BONE OF FOOT, LEFT: ICD-10-CM

## 2025-07-17 DIAGNOSIS — G47.8 DIFFICULTY WAKING: ICD-10-CM

## 2025-07-17 PROCEDURE — 97110 THERAPEUTIC EXERCISES: CPT | Performed by: PHYSICAL THERAPIST

## 2025-07-17 NOTE — THERAPY TREATMENT NOTE
Outpatient Physical Therapy Ortho Treatment Note  [facility]     Patient Name: Sakina Guerrier  : 1965  MRN: [id number]  Today's Date: 2025      Visit Date: 2025    Visit Dx:    ICD-10-CM ICD-9-CM   1. Posterior tibial tendon dysfunction  M76.829 734   2. Impingement syndrome of left ankle  M25.872 726.79   3. Sinus tarsitis of left foot  M25.572 726.79   4. Pes planus, unspecified laterality  M21.40 734   5. Pain associated with accessory navicular bone of foot, left  M79.672 729.5    Q74.2 755.67   6. Chronic pain of left ankle  M25.572 719.47    G89.29 338.29   7. Left foot pain  M79.672 729.5   8. Difficulty waking  G47.8 780.59       Patient Active Problem List   Diagnosis    Insomnia    Hemorrhoids, external    Menopause syndrome    Primary osteoarthritis of right hip    Palpitations    Ventricular tachycardia, non-sustained    History of rheumatic fever as a child    Anxiety    Annual physical exam    Other hyperlipidemia    Pain associated with accessory navicular bone of foot, left    Posterior tibial tendon dysfunction    Pes planus    Impingement syndrome of left ankle    Sinus tarsitis of left foot    Gastroesophageal reflux disease without esophagitis    Overweight with body mass index (BMI) of 26 to 26.9 in adult        Past Medical History:   Diagnosis Date    Abnormal Pap smear of cervix     Acne     Acquired hypothyroidism     Anxiety     Cancer 2007    VULVAR CARCINOMA IN SITU    Colon polyp     Found last colonoscopy    Depression     Endometriosis     GERD (gastroesophageal reflux disease)     Hammertoe of right foot 2015    4TH RIGHT TOE    Hemorrhoids     Hip arthrosis 9 months    HPV (human papilloma virus) infection     Influenza A 2018    Lateral epicondylitis of right elbow 2013    Left knee pain 10/19/2017    SAW DR. GHASSAN COLE    Left ovarian cyst 2006    FOLLICLE CYST    Lesion of left plantar nerve     Menopause     Neuroma of  foot 5 years    Surgery to correct    Onychomycosis 2014    Primary insomnia     Rheumatic fever     AS A CHILD    Right hip pain     Syncope 2018    SEEN AT [facility]    Thrombosed hemorrhoids 2017    SAW DR. FAUZIA GIRON    Varicella     Ventricular tachycardia         Past Surgical History:   Procedure Laterality Date    ANKLE TENDON REPAIR Left 2025    Procedure: Left posterior tib tendon debridement and repair.  Left calcaneal osteotomy and medial midfoot osteotomy.;  Surgeon: Primo Pride MD;  Location: [facility];  Service: Orthopedics;  Laterality: Left;    BREAST SURGERY Bilateral 2005    AUGMENTATION MAMMOPLASTY, DR. TONY FERGUSON AT [facility]     SECTION N/A     CHOLECYSTECTOMY N/A 2016    DONE IN [place]    COLONOSCOPY N/A 2017    ENTIRE COLON WNL, RESCOPE IN 5 YRS, DR. FAUZIA GIRON AT [facility]    COLPOSCOPY W/ BIOPSY / CURETTAGE N/A 2007    BX OF PERIANAL LESION, PATH: SEVERE DYSPLASIA, CARCINOMA IN SITU, DR.REBECCA MICHELLE AT [facility]    ENDOMETRIAL ABLATION N/A 2006    WITH EUA, LYSIS OF ADHESIONS, AND ASPIRATION OF LEFT OVARIAN FOLLICLE CYST, DR. TONY MICHELLE AT [facility]    FOOT SURGERY Left     plate and pen removal     JOINT REPLACEMENT  May 2022    R hip    LAPAROSCOPIC CHOLECYSTECTOMY  2016    NEUROMA SURGERY Bilateral 02/10/2016    right third intermetatarsal space; [facility]; Lee James DPM    TOTAL HIP ARTHROPLASTY Right 2022    Procedure: Right anterior total hip arthroplasty;  Surgeon: Maciel Luis MD;  Location: [facility];  Service: Orthopedics;  Laterality: Right;    WISDOM TOOTH EXTRACTION                          PT Assessment/Plan       Row Name 25 1116          PT Assessment    Assessment Comments Ms. Guerrier returns to the clinic ambulating with crutches, fracture boot on LLE. She is 9 week 2 days s/p S/p L posterir tib tendon debridement and repair, L calcaneal osteotoy and medial midfoot  osteotomy.  She is allowed to transition to 50% wt. bearing on 7/20/2025. She demonstrates variable levels of weight acceptance during ambulation. Worked on gait pattern with crutches, reviewing current weight bearing restrictions to foot flat (vs. 50%).  Continued to work on LE strength/ROM. She demosntrates well healing incision. We adjusted crutches.  She continues to be a good candidate for skilled physical therapy.  -GJ        PT Plan    PT Plan Comments progress wt bearing to 50% in a fracture boot.  Consider bilateral lower extremity leg press, sit to stand, weight shifts  -GJ               User Key  (r) = Recorded By, (t) = Taken By, (c) = Cosigned By      Initials Name Provider Type     Iván Resendiz, PT Physical Therapist                       OP Exercises       Row Name 07/17/25 1047 07/17/25 1000          Subjective    Subjective Comments -- i am having a hard time with the crutches  -GJ        Subjective Pain    Pre-Treatment Pain Level -- 3  -GJ        Total Minutes    36996 - PT Therapeutic Exercise Minutes 40  -GJ --        Exercise 1    Exercise Name 1 -- Nustep  -GJ        Exercise 2    Exercise Name 2 -- bridges over swiss ball  -GJ     Cueing 2 -- Verbal;Demo  -GJ     Sets 2 -- 2  -GJ     Reps 2 -- 10  -GJ     Time 2 -- 3s  -GJ     Additional Comments -- green SB  -GJ        Exercise 3    Exercise Name 3 -- Seated HR/TR  -GJ     Cueing 3 -- Verbal;Demo  -GJ     Sets 3 -- 2  -GJ     Reps 3 -- 10  -GJ        Exercise 5    Exercise Name 5 -- ankle circles, CW, CCW  -GJ     Cueing 5 -- Verbal;Demo  -GJ     Sets 5 -- 2  -GJ     Reps 5 -- 10  -GJ     Time 5 -- each  -GJ        Exercise 6    Exercise Name 6 -- BAPS  -GJ     Cueing 6 -- Verbal;Demo  -GJ     Reps 6 -- 20 each  -GJ     Time 6 -- A/P, M/L, CW, CCW  -GJ     Additional Comments -- seated  -GJ        Exercise 8    Exercise Name 8 -- SL clam  -GJ     Cueing 8 -- Verbal;Demo  -GJ     Sets 8 -- 2  -GJ     Reps 8 -- 10  -GJ     Time 8 -- GTB   -GJ        Exercise 9    Exercise Name 9 -- long sit HS/gastroc stretch  -GJ     Cueing 9 -- Verbal;Demo  -GJ     Reps 9 -- 3  -GJ     Time 9 -- 20s  -GJ        Exercise 10    Exercise Name 10 -- crutch training with 3 point pattern  -GJ     Time 10 -- 10min  -GJ        Exercise 11    Exercise Name 11 -- L ankle resisted DF, PF, inversion, ,eversion  -GJ     Cueing 11 -- Verbal;Tactile;Demo  -GJ     Reps 11 -- 20 each  -GJ     Time 11 -- RTB  -GJ               User Key  (r) = Recorded By, (t) = Taken By, (c) = Cosigned By      Initials Name Provider Type    GJ Iván Resendiz, PT Physical Therapist                                  PT OP Goals       Row Name 07/17/25 1000          PT Short Term Goals    STG Date to Achieve 07/31/25  -GJ     STG 1 Pt will be independent and verbalized good understanding of initial HEP to improve ability to manage pain, as well as improve ankle strength and mobility.  -GJ     STG 1 Progress Met  -GJ     STG 2 The pt will demonstrate L ankle strength to at least 4-/5 for improved functional strength and balance.  -GJ     STG 2 Progress Ongoing  -GJ     STG 3 The pt will demonstrate LLE SLS to at least 10 sec for improved stair navigation and balance.  -GJ     STG 3 Progress Ongoing  -GJ     STG 4 Pt. will report ability to stand/walk for 10 minutes with pain <4/10 to facilitate return to work activities  -GJ     STG 4 Progress Ongoing  -GJ        Long Term Goals    LTG Date to Achieve 08/30/25  -GJ     LTG 1 The pt will demonstrate IND and compliant with progressive HEP focused on IND condition management and return to PLOF.  -GJ     LTG 1 Progress Ongoing  -GJ     LTG 2 The pt will ambulate over uneven ground for 20 min with pain no greater than 4/10 for return to recreational activities.  -GJ     LTG 2 Progress Ongoing  -GJ     LTG 3 The pt will demonstrate L ankle strength to at least 4+/5 for improved functional strength and balance.  -GJ     LTG 3 Progress Ongoing  -GJ     LTG 4  The pt will demonstrate LLE SLS to at least 20 sec for improved stair navigation and balance.  -     LTG 4 Progress Ongoing  -     LTG 5 The pt will score greater than or equal to 68/84 ability on the FAAM to indicate improved perceived performance of ADLs.  -     LTG 5 Progress Ongoing  -               User Key  (r) = Recorded By, (t) = Taken By, (c) = Cosigned By      Initials Name Provider Type     Iván Resendiz, PT Physical Therapist                    Therapy Education  Education Details: reviewed activity modifications, reviewed weight bearing restrictions, use of crutcvhds  Given: HEP, Symptoms/condition management, Pain management, Posture/body mechanics, Fall prevention and home safety, Mobility training  Program: Reinforced, New, Progressed  How Provided: Verbal, Demonstration  Provided to: Patient  Level of Understanding: Teach back education performed, Verbalized, Demonstrated              Time Calculation:   Start Time: 1047  Stop Time: 1130  Time Calculation (min): 43 min  Timed Charges  48744 - PT Therapeutic Exercise Minutes: 40  Total Minutes  Timed Charges Total Minutes: 40   Total Minutes: 40  Therapy Charges for Today       Code Description Service Date Service Provider Modifiers Qty    [id number]  PT THER PROC EA 15 MIN 7/17/2025 Iván Resendiz, PT GP 3                      Iván Resendiz, PT  7/17/2025

## 2025-07-22 ENCOUNTER — HOSPITAL ENCOUNTER (OUTPATIENT)
Dept: PHYSICAL THERAPY | Facility: HOSPITAL | Age: 60
Setting detail: THERAPIES SERIES
Discharge: HOME | End: 2025-07-22
Payer: MEDICAID

## 2025-07-22 DIAGNOSIS — M25.572 SINUS TARSITIS OF LEFT FOOT: ICD-10-CM

## 2025-07-22 DIAGNOSIS — M25.872 IMPINGEMENT SYNDROME OF LEFT ANKLE: ICD-10-CM

## 2025-07-22 DIAGNOSIS — Q74.2 PAIN ASSOCIATED WITH ACCESSORY NAVICULAR BONE OF FOOT, LEFT: ICD-10-CM

## 2025-07-22 DIAGNOSIS — M21.40 PES PLANUS, UNSPECIFIED LATERALITY: ICD-10-CM

## 2025-07-22 DIAGNOSIS — M79.672 PAIN ASSOCIATED WITH ACCESSORY NAVICULAR BONE OF FOOT, LEFT: ICD-10-CM

## 2025-07-22 DIAGNOSIS — M76.829 POSTERIOR TIBIAL TENDON DYSFUNCTION: Primary | ICD-10-CM

## 2025-07-22 PROCEDURE — 97110 THERAPEUTIC EXERCISES: CPT

## 2025-07-22 NOTE — THERAPY TREATMENT NOTE
Outpatient Physical Therapy Ortho Treatment Note  [facility]     Patient Name: Sakina Guerrier  : 1965  MRN: [id number]  Today's Date: 2025      Visit Date: 2025    Visit Dx:    ICD-10-CM ICD-9-CM   1. Posterior tibial tendon dysfunction  M76.829 734   2. Impingement syndrome of left ankle  M25.872 726.79   3. Sinus tarsitis of left foot  M25.572 726.79   4. Pes planus, unspecified laterality  M21.40 734   5. Pain associated with accessory navicular bone of foot, left  M79.672 729.5    Q74.2 755.67       Patient Active Problem List   Diagnosis    Insomnia    Hemorrhoids, external    Menopause syndrome    Primary osteoarthritis of right hip    Palpitations    Ventricular tachycardia, non-sustained    History of rheumatic fever as a child    Anxiety    Annual physical exam    Other hyperlipidemia    Pain associated with accessory navicular bone of foot, left    Posterior tibial tendon dysfunction    Pes planus    Impingement syndrome of left ankle    Sinus tarsitis of left foot    Gastroesophageal reflux disease without esophagitis    Overweight with body mass index (BMI) of 26 to 26.9 in adult        Past Medical History:   Diagnosis Date    Abnormal Pap smear of cervix     Acne     Acquired hypothyroidism     Anxiety     Cancer 2007    VULVAR CARCINOMA IN SITU    Colon polyp     Found last colonoscopy    Depression     Endometriosis     GERD (gastroesophageal reflux disease)     Hammertoe of right foot 2015    4TH RIGHT TOE    Hemorrhoids     Hip arthrosis 9 months    HPV (human papilloma virus) infection     Influenza A 2018    Lateral epicondylitis of right elbow 2013    Left knee pain 10/19/2017    SAW DR. GHASSAN COLE    Left ovarian cyst 2006    FOLLICLE CYST    Lesion of left plantar nerve     Menopause     Neuroma of foot 5 years    Surgery to correct    Onychomycosis 2014    Primary insomnia     Rheumatic fever     AS A CHILD    Right hip pain      Syncope 2018    SEEN AT [facility]    Thrombosed hemorrhoids 2017    SAW DR. FAUZIA GIRNO    Varicella     Ventricular tachycardia         Past Surgical History:   Procedure Laterality Date    ANKLE TENDON REPAIR Left 2025    Procedure: Left posterior tib tendon debridement and repair.  Left calcaneal osteotomy and medial midfoot osteotomy.;  Surgeon: Primo Pride MD;  Location: [facility];  Service: Orthopedics;  Laterality: Left;    BREAST SURGERY Bilateral 2005    AUGMENTATION MAMMOPLASTY, DR. TONY FERGUSON AT [facility]     SECTION N/A     CHOLECYSTECTOMY N/A 2016    DONE IN [place]    COLONOSCOPY N/A 2017    ENTIRE COLON WNL, RESCOPE IN 5 YRS, DR. FAUZIA GIRON AT [facility]    COLPOSCOPY W/ BIOPSY / CURETTAGE N/A 2007    BX OF PERIANAL LESION, PATH: SEVERE DYSPLASIA, CARCINOMA IN SITU, DR.REBECCA MICHELLE AT [facility]    ENDOMETRIAL ABLATION N/A 2006    WITH EUA, LYSIS OF ADHESIONS, AND ASPIRATION OF LEFT OVARIAN FOLLICLE CYST, DR. TONY MICHELLE AT [facility]    FOOT SURGERY Left     plate and pen removal     JOINT REPLACEMENT  May 2022    R hip    LAPAROSCOPIC CHOLECYSTECTOMY  2016    NEUROMA SURGERY Bilateral 02/10/2016    right third intermetatarsal space; [facility] [place]; Lee James DPM    TOTAL HIP ARTHROPLASTY Right 2022    Procedure: Right anterior total hip arthroplasty;  Surgeon: Maciel Luis MD;  Location: [facility];  Service: Orthopedics;  Laterality: Right;    WISDOM TOOTH EXTRACTION                          PT Assessment/Plan       Row Name 25 1200          PT Assessment    Assessment Comments Sakina Conchitacaio returns today and is now 10 weeks s/p L posterior tib tendon debridement and repair, L calcaneal osteotomy and medial midfoot osteotomy. Pt arrives using scooter with boot donned on the L foot. Pt reports that she is having minimal pain but also has some decreased sensation on the foot still. Pt has a follow up with her doctor  on the 31st. Discussed and educated patient about 50% weight bearing restriction and used scale today to incorporate better retention. Added weight shifting activity today with the use of the scale. Continued current exercise program on this date with progressions as appropriate and as tolerated by the patient. Pt is able to complete gait training with BL crutches on this date with 50% weight bearing in the boot with improved gait pattern and retention with repetitions. Pt voices she feels much more comfortable on the crutches today with being able to weight bear more through the foot. Pt remains appropriate for skilled PT services.  -        PT Plan    PT Plan Comments Response to initial 50% weight bearing activities, consider rupali rupali with bar and crutch to maintain 50% WB, side stepping in bars with UE support, gait training with a single crutch  -               User Key  (r) = Recorded By, (t) = Taken By, (c) = Cosigned By      Initials Name Provider Type     Ankit Escobar, PT Physical Therapist                       OP Exercises       Row Name 07/22/25 1100             Subjective    Subjective Comments I dont have a lot of pain my foot just feels numb  -         Subjective Pain    Able to rate subjective pain? yes  -      Pre-Treatment Pain Level 2  -      Post-Treatment Pain Level 2  -         Total Minutes    81923 - PT Therapeutic Exercise Minutes 42  -         Exercise 1    Exercise Name 1 Nustep  -      Time 1 5 min  -         Exercise 2    Exercise Name 2 bridges over swiss ball  -      Cueing 2 Verbal;Demo  -      Sets 2 2  -MH      Reps 2 10  -MH      Time 2 3s  -      Additional Comments green  -         Exercise 3    Exercise Name 3 Seated HR/TR  -      Cueing 3 Verbal;Demo  -      Sets 3 2  -MH      Reps 3 10  -MH         Exercise 4    Exercise Name 4 Weight shifting on scale  -      Cueing 4 Verbal;Demo  -      Sets 4 1  -MH      Reps 4 15  -MH      Time 4 5s   -      Additional Comments 50% weight bearing  -         Exercise 6    Exercise Name 6 BAPS  -      Cueing 6 Verbal;Demo  -      Reps 6 20 each  -      Time 6 A/P, M/L, CW, CCW  -      Additional Comments seated  -         Exercise 8    Exercise Name 8 SL clam  -      Cueing 8 Verbal;Demo  -      Sets 8 2  -      Reps 8 10  -MH      Time 8 GTB  -         Exercise 9    Exercise Name 9 long sit HS/gastroc stretch  -      Cueing 9 Verbal;Demo  -      Reps 9 4  -MH      Time 9 20s  -      Additional Comments green strap  -         Exercise 10    Exercise Name 10 crutch training with 3 point pattern  -      Time 10 8 min  -      Additional Comments 50% weight bearing  -         Exercise 11    Exercise Name 11 L ankle resisted DF, PF, inversion, ,eversion  -      Cueing 11 Verbal;Tactile;Demo  -      Reps 11 20 each  -      Time 11 RTB  -                User Key  (r) = Recorded By, (t) = Taken By, (c) = Cosigned By      Initials Name Provider Type     Ankit Escobar, PT Physical Therapist                                  PT OP Goals       Row Name 07/22/25 1100          PT Short Term Goals    STG Date to Achieve 07/31/25  -     STG 1 Pt will be independent and verbalized good understanding of initial HEP to improve ability to manage pain, as well as improve ankle strength and mobility.  -     STG 1 Progress Met  -     STG 2 The pt will demonstrate L ankle strength to at least 4-/5 for improved functional strength and balance.  -     STG 2 Progress Ongoing  -     STG 3 The pt will demonstrate LLE SLS to at least 10 sec for improved stair navigation and balance.  -     STG 3 Progress Ongoing  -     STG 4 Pt. will report ability to stand/walk for 10 minutes with pain <4/10 to facilitate return to work activities  -     STG 4 Progress Ongoing  -        Long Term Goals    LTG Date to Achieve 08/30/25  -     LTG 1 The pt will demonstrate IND and compliant with  progressive HEP focused on IND condition management and return to PLOF.  -     LTG 1 Progress Ongoing  -     LTG 2 The pt will ambulate over uneven ground for 20 min with pain no greater than 4/10 for return to recreational activities.  -     LTG 2 Progress Ongoing  -     LTG 3 The pt will demonstrate L ankle strength to at least 4+/5 for improved functional strength and balance.  -     LTG 3 Progress Ongoing  -     LTG 4 The pt will demonstrate LLE SLS to at least 20 sec for improved stair navigation and balance.  -     LTG 4 Progress Ongoing  -     LTG 5 The pt will score greater than or equal to 68/84 ability on the FAAM to indicate improved perceived performance of ADLs.  -     LTG 5 Progress Ongoing  -               User Key  (r) = Recorded By, (t) = Taken By, (c) = Cosigned By      Initials Name Provider Type    Ankit Cary, PT Physical Therapist                    Therapy Education  Education Details: 50% weight bearing and potential progression to a single crutch for gait  Given: Symptoms/condition management, Pain management, Posture/body mechanics  Program: Reinforced  How Provided: Verbal, Demonstration  Provided to: Patient  Level of Understanding: Verbalized, Demonstrated              Time Calculation:   Start Time: 1133  Stop Time: 1215  Time Calculation (min): 42 min  Timed Charges  28995 - PT Therapeutic Exercise Minutes: 42  Total Minutes  Timed Charges Total Minutes: 42   Total Minutes: 42  Therapy Charges for Today       Code Description Service Date Service Provider Modifiers Qty    11694000921  PT THER PROC EA 15 MIN 7/22/2025 Ankit Escobar, PT GP 3                      Ankit Escobar PT  7/22/2025

## 2025-07-24 ENCOUNTER — HOSPITAL ENCOUNTER (OUTPATIENT)
Dept: PHYSICAL THERAPY | Facility: HOSPITAL | Age: 60
Setting detail: THERAPIES SERIES
Discharge: HOME | End: 2025-07-24
Payer: MEDICAID

## 2025-07-24 DIAGNOSIS — M25.572 SINUS TARSITIS OF LEFT FOOT: ICD-10-CM

## 2025-07-24 DIAGNOSIS — M79.672 PAIN ASSOCIATED WITH ACCESSORY NAVICULAR BONE OF FOOT, LEFT: ICD-10-CM

## 2025-07-24 DIAGNOSIS — M76.829 POSTERIOR TIBIAL TENDON DYSFUNCTION: Primary | ICD-10-CM

## 2025-07-24 DIAGNOSIS — M25.872 IMPINGEMENT SYNDROME OF LEFT ANKLE: ICD-10-CM

## 2025-07-24 DIAGNOSIS — Q74.2 PAIN ASSOCIATED WITH ACCESSORY NAVICULAR BONE OF FOOT, LEFT: ICD-10-CM

## 2025-07-24 DIAGNOSIS — M21.40 PES PLANUS, UNSPECIFIED LATERALITY: ICD-10-CM

## 2025-07-24 PROCEDURE — 97110 THERAPEUTIC EXERCISES: CPT

## 2025-07-24 PROCEDURE — 97116 GAIT TRAINING THERAPY: CPT

## 2025-07-24 NOTE — THERAPY TREATMENT NOTE
Outpatient Physical Therapy Ortho Treatment Note  [facility]     Patient Name: Sakina Guerrier  : 1965  MRN: [id number]  Today's Date: 2025      Visit Date: 2025    Visit Dx:    ICD-10-CM ICD-9-CM   1. Posterior tibial tendon dysfunction  M76.829 734   2. Impingement syndrome of left ankle  M25.872 726.79   3. Sinus tarsitis of left foot  M25.572 726.79   4. Pes planus, unspecified laterality  M21.40 734   5. Pain associated with accessory navicular bone of foot, left  M79.672 729.5    Q74.2 755.67       Patient Active Problem List   Diagnosis    Insomnia    Hemorrhoids, external    Menopause syndrome    Primary osteoarthritis of right hip    Palpitations    Ventricular tachycardia, non-sustained    History of rheumatic fever as a child    Anxiety    Annual physical exam    Other hyperlipidemia    Pain associated with accessory navicular bone of foot, left    Posterior tibial tendon dysfunction    Pes planus    Impingement syndrome of left ankle    Sinus tarsitis of left foot    Gastroesophageal reflux disease without esophagitis    Overweight with body mass index (BMI) of 26 to 26.9 in adult        Past Medical History:   Diagnosis Date    Abnormal Pap smear of cervix     Acne     Acquired hypothyroidism     Anxiety     Cancer 2007    VULVAR CARCINOMA IN SITU    Colon polyp     Found last colonoscopy    Depression     Endometriosis     GERD (gastroesophageal reflux disease)     Hammertoe of right foot 2015    4TH RIGHT TOE    Hemorrhoids     Hip arthrosis 9 months    HPV (human papilloma virus) infection     Influenza A 2018    Lateral epicondylitis of right elbow 2013    Left knee pain 10/19/2017    SAW DR. GHASSAN COLE    Left ovarian cyst 2006    FOLLICLE CYST    Lesion of left plantar nerve     Menopause     Neuroma of foot 5 years    Surgery to correct    Onychomycosis 2014    Primary insomnia     Rheumatic fever     AS A CHILD    Right hip pain      Syncope 2018    SEEN AT [facility]    Thrombosed hemorrhoids 2017    SAW DR. FAUZIA GIRON    Varicella     Ventricular tachycardia         Past Surgical History:   Procedure Laterality Date    ANKLE TENDON REPAIR Left 2025    Procedure: Left posterior tib tendon debridement and repair.  Left calcaneal osteotomy and medial midfoot osteotomy.;  Surgeon: Primo Pride MD;  Location: [facility];  Service: Orthopedics;  Laterality: Left;    BREAST SURGERY Bilateral 2005    AUGMENTATION MAMMOPLASTY, DR. TONY FERGUSON AT [facility]     SECTION N/A     CHOLECYSTECTOMY N/A 2016    DONE IN [place]    COLONOSCOPY N/A 2017    ENTIRE COLON WNL, RESCOPE IN 5 YRS, DR. FAUZIA GIRON AT [facility]    COLPOSCOPY W/ BIOPSY / CURETTAGE N/A 2007    BX OF PERIANAL LESION, PATH: SEVERE DYSPLASIA, CARCINOMA IN SITU, DR.REBECCA MICHELLE AT [facility]    ENDOMETRIAL ABLATION N/A 2006    WITH EUA, LYSIS OF ADHESIONS, AND ASPIRATION OF LEFT OVARIAN FOLLICLE CYST, DR. TONY MICHELLE AT [facility]    FOOT SURGERY Left     plate and pen removal     JOINT REPLACEMENT  May 2022    R hip    LAPAROSCOPIC CHOLECYSTECTOMY  2016    NEUROMA SURGERY Bilateral 02/10/2016    right third intermetatarsal space; [facility] [place]; Lee James DPM    TOTAL HIP ARTHROPLASTY Right 2022    Procedure: Right anterior total hip arthroplasty;  Surgeon: Maciel Luis MD;  Location: [facility];  Service: Orthopedics;  Laterality: Right;    WISDOM TOOTH EXTRACTION                          PT Assessment/Plan       Row Name 25 1100          PT Assessment    Assessment Comments Pt returns to PT w/ 50% WB status allowed to her L foot in her fx boot. Pt ambulates into clinic w/ B axillary crutches, pt w/ poor gait pattern that does not improve w/ cueing. She puts her  PWB foot forward first, bears weight and then advances her crutches. Pt reports minimal pain at her incision site but overall no difficulty. Continued  w/ gait training techniques, has initial difficulty but able to perform well after several steps, unable to maintain at end of session and noticing 100% WB through her LLE and minimal use of crutches. Added rupali manzano in for continued WB activities and progression into RUE use of crutch for gradual progression away from AD when appropriate. Pt difficulty w/ sequencing this activity and requires several cues to maintain WB precautions. Pt does report that she has been basically full WB at home and not using her crutches that much. Following up w/ her MD next week and hopefully progresses to full WB.  -CS        PT Plan    PT Plan Comments Continue w/ WB precautions and gait mechanics w/ crutches, consider side stepping at // bars,  -CS               User Key  (r) = Recorded By, (t) = Taken By, (c) = Cosigned By      Initials Name Provider Type    Durga Wong, PT Physical Therapist                       OP Exercises       Row Name 07/24/25 1100             Subjective    Subjective Comments I have minimal pain at my incision but it feels good overall  -CS         Subjective Pain    Able to rate subjective pain? yes  -CS      Pre-Treatment Pain Level 2  -CS      Post-Treatment Pain Level 2  -CS         Total Minutes    63295 - Gait Training Minutes  20  -CS      02033 - PT Therapeutic Exercise Minutes 21  -CS         Exercise 1    Exercise Name 1 Nustep  -CS      Time 1 5 min  -CS         Exercise 2    Exercise Name 2 bridges over swiss ball  -CS      Cueing 2 Verbal;Demo  -CS      Sets 2 2  -CS      Reps 2 10  -CS      Time 2 3s  -CS      Additional Comments Green  -CS         Exercise 3    Exercise Name 3 Seated HR/TR  -CS      Cueing 3 Verbal;Demo  -CS      Sets 3 2  -CS      Reps 3 10  -CS         Exercise 5    Exercise Name 5 Royals w/ use of barre  -CS      Cueing 5 Verbal;Demo  -CS      Time 5 6 mins  -CS      Additional Comments Maintaining 50% WB on LLE, RUE w/ crutch  -CS         Exercise 6    Exercise  Name 6 BAPS  -CS      Cueing 6 Verbal;Demo  -CS      Reps 6 20 each  -CS      Time 6 A/P, M/L, CW, CCW  -CS      Additional Comments seated, L2  -CS         Exercise 8    Exercise Name 8 SL clam  -CS      Cueing 8 Verbal;Demo  -CS      Sets 8 2  -CS      Reps 8 10  -CS      Time 8 GTB  -CS         Exercise 9    Exercise Name 9 long sit HS/gastroc stretch  -CS      Cueing 9 Verbal;Demo  -CS      Reps 9 3  -CS      Time 9 30s  -CS      Additional Comments green strap  -CS         Exercise 10    Exercise Name 10 crutch training with 3 point pattern  -CS      Cueing 10 Verbal;Demo  -CS      Time 10 14 min  -CS      Additional Comments 50% WB, cues for crutches first  -CS         Exercise 11    Exercise Name 11 L ankle resisted DF, PF, inversion, ,eversion  -CS      Cueing 11 Verbal;Tactile;Demo  -CS      Reps 11 20 each  -CS      Time 11 RTB  -CS                User Key  (r) = Recorded By, (t) = Taken By, (c) = Cosigned By      Initials Name Provider Type    CS Durga Breen, PT Physical Therapist                                  PT OP Goals       Row Name 07/24/25 1100          PT Short Term Goals    STG Date to Achieve 07/31/25  -CS     STG 1 Pt will be independent and verbalized good understanding of initial HEP to improve ability to manage pain, as well as improve ankle strength and mobility.  -CS     STG 1 Progress Met  -CS     STG 2 The pt will demonstrate L ankle strength to at least 4-/5 for improved functional strength and balance.  -CS     STG 2 Progress Ongoing  -CS     STG 3 The pt will demonstrate LLE SLS to at least 10 sec for improved stair navigation and balance.  -CS     STG 3 Progress Ongoing  -CS     STG 4 Pt. will report ability to stand/walk for 10 minutes with pain <4/10 to facilitate return to work activities  -CS     STG 4 Progress Ongoing  -CS        Long Term Goals    LTG Date to Achieve 08/30/25  -CS     LTG 1 The pt will demonstrate IND and compliant with progressive HEP focused on IND  condition management and return to PLOF.  -CS     LTG 1 Progress Ongoing  -CS     LTG 2 The pt will ambulate over uneven ground for 20 min with pain no greater than 4/10 for return to recreational activities.  -CS     LTG 2 Progress Ongoing  -CS     LTG 3 The pt will demonstrate L ankle strength to at least 4+/5 for improved functional strength and balance.  -CS     LTG 3 Progress Ongoing  -CS     LTG 4 The pt will demonstrate LLE SLS to at least 20 sec for improved stair navigation and balance.  -CS     LTG 4 Progress Ongoing  -CS     LTG 5 The pt will score greater than or equal to 68/84 ability on the FAAM to indicate improved perceived performance of ADLs.  -CS     LTG 5 Progress Ongoing  -CS               User Key  (r) = Recorded By, (t) = Taken By, (c) = Cosigned By      Initials Name Provider Type    uDrga Wong PT Physical Therapist                    Therapy Education  Education Details: 50% WB, gait training, appropriate use of crutches  Given: Symptoms/condition management, Mobility training, Fall prevention and home safety, Posture/body mechanics  Program: New, Reinforced, Progressed  How Provided: Verbal, Demonstration  Provided to: Patient  Level of Understanding: Verbalized, Demonstrated              Time Calculation:   Start Time: 1145  Stop Time: 1226  Time Calculation (min): 41 min  Timed Charges  19836 - PT Therapeutic Exercise Minutes: 21  70068 - Gait Training Minutes : 20  Total Minutes  Timed Charges Total Minutes: 41   Total Minutes: 41  Therapy Charges for Today       Code Description Service Date Service Provider Modifiers Qty    02886879866  PT THER PROC EA 15 MIN 7/24/2025 Durga Breen, PT GP 2    [id number] HC GAIT TRAINING EA 15 MIN 7/24/2025 Durga Breen, PT GP 1                      Durga Breen PT  7/24/2025

## 2025-07-29 ENCOUNTER — HOSPITAL ENCOUNTER (OUTPATIENT)
Dept: PHYSICAL THERAPY | Facility: HOSPITAL | Age: 60
Setting detail: THERAPIES SERIES
Discharge: HOME | End: 2025-07-29
Payer: MEDICAID

## 2025-07-29 DIAGNOSIS — G47.8 DIFFICULTY WAKING: ICD-10-CM

## 2025-07-29 DIAGNOSIS — M25.572 SINUS TARSITIS OF LEFT FOOT: ICD-10-CM

## 2025-07-29 DIAGNOSIS — Q74.2 PAIN ASSOCIATED WITH ACCESSORY NAVICULAR BONE OF FOOT, LEFT: ICD-10-CM

## 2025-07-29 DIAGNOSIS — M21.40 PES PLANUS, UNSPECIFIED LATERALITY: ICD-10-CM

## 2025-07-29 DIAGNOSIS — G89.29 CHRONIC PAIN OF LEFT ANKLE: ICD-10-CM

## 2025-07-29 DIAGNOSIS — M25.872 IMPINGEMENT SYNDROME OF LEFT ANKLE: ICD-10-CM

## 2025-07-29 DIAGNOSIS — M76.829 POSTERIOR TIBIAL TENDON DYSFUNCTION: Primary | ICD-10-CM

## 2025-07-29 DIAGNOSIS — M79.672 PAIN ASSOCIATED WITH ACCESSORY NAVICULAR BONE OF FOOT, LEFT: ICD-10-CM

## 2025-07-29 DIAGNOSIS — M79.672 LEFT FOOT PAIN: ICD-10-CM

## 2025-07-29 DIAGNOSIS — M25.572 CHRONIC PAIN OF LEFT ANKLE: ICD-10-CM

## 2025-07-29 PROCEDURE — 97110 THERAPEUTIC EXERCISES: CPT

## 2025-07-29 NOTE — THERAPY TREATMENT NOTE
Outpatient Physical Therapy Ortho Treatment Note  [facility]     Patient Name: Sakina Guerrier  : 1965  MRN: [id number]  Today's Date: 2025      Visit Date: 2025    Visit Dx:    ICD-10-CM ICD-9-CM   1. Posterior tibial tendon dysfunction  M76.829 734   2. Impingement syndrome of left ankle  M25.872 726.79   3. Sinus tarsitis of left foot  M25.572 726.79   4. Pes planus, unspecified laterality  M21.40 734   5. Difficulty waking  G47.8 780.59   6. Left foot pain  M79.672 729.5   7. Chronic pain of left ankle  M25.572 719.47    G89.29 338.29   8. Pain associated with accessory navicular bone of foot, left  M79.672 729.5    Q74.2 755.67       Patient Active Problem List   Diagnosis    Insomnia    Hemorrhoids, external    Menopause syndrome    Primary osteoarthritis of right hip    Palpitations    Ventricular tachycardia, non-sustained    History of rheumatic fever as a child    Anxiety    Annual physical exam    Other hyperlipidemia    Pain associated with accessory navicular bone of foot, left    Posterior tibial tendon dysfunction    Pes planus    Impingement syndrome of left ankle    Sinus tarsitis of left foot    Gastroesophageal reflux disease without esophagitis    Overweight with body mass index (BMI) of 26 to 26.9 in adult        Past Medical History:   Diagnosis Date    Abnormal Pap smear of cervix     Acne     Acquired hypothyroidism     Anxiety     Cancer 2007    VULVAR CARCINOMA IN SITU    Colon polyp     Found last colonoscopy    Depression     Endometriosis     GERD (gastroesophageal reflux disease)     Hammertoe of right foot 2015    4TH RIGHT TOE    Hemorrhoids     Hip arthrosis 9 months    HPV (human papilloma virus) infection     Influenza A 2018    Lateral epicondylitis of right elbow 2013    Left knee pain 10/19/2017    SAW DR. GHASSAN COLE    Left ovarian cyst 2006    FOLLICLE CYST    Lesion of left plantar nerve     Menopause     Neuroma of  foot 5 years    Surgery to correct    Onychomycosis 2014    Primary insomnia     Rheumatic fever     AS A CHILD    Right hip pain     Syncope 2018    SEEN AT [facility]    Thrombosed hemorrhoids 2017    SAW DR. FAUZIA GIRON    Varicella     Ventricular tachycardia         Past Surgical History:   Procedure Laterality Date    ANKLE TENDON REPAIR Left 2025    Procedure: Left posterior tib tendon debridement and repair.  Left calcaneal osteotomy and medial midfoot osteotomy.;  Surgeon: Primo Pride MD;  Location: [facility];  Service: Orthopedics;  Laterality: Left;    BREAST SURGERY Bilateral 2005    AUGMENTATION MAMMOPLASTY, DR. TONY FERGUSON AT [facility]     SECTION N/A     CHOLECYSTECTOMY N/A     DONE IN [place]    COLONOSCOPY N/A 2017    ENTIRE COLON WNL, RESCOPE IN 5 YRS, DR. FAUZIA GIRON AT [facility]    COLPOSCOPY W/ BIOPSY / CURETTAGE N/A 2007    BX OF PERIANAL LESION, PATH: SEVERE DYSPLASIA, CARCINOMA IN SITU, DR.REBECCA MICHELLE AT [facility]    ENDOMETRIAL ABLATION N/A 2006    WITH EUA, LYSIS OF ADHESIONS, AND ASPIRATION OF LEFT OVARIAN FOLLICLE CYST, DR. TONY MICHELLE AT [facility]    FOOT SURGERY Left     plate and pen removal     JOINT REPLACEMENT  May 2022    R hip    LAPAROSCOPIC CHOLECYSTECTOMY  2016    NEUROMA SURGERY Bilateral 02/10/2016    right third intermetatarsal space; [facility]; Lee James DPM    TOTAL HIP ARTHROPLASTY Right 2022    Procedure: Right anterior total hip arthroplasty;  Surgeon: Maciel Luis MD;  Location: [facility];  Service: Orthopedics;  Laterality: Right;    WISDOM TOOTH EXTRACTION                          PT Assessment/Plan       Row Name 25 1200          PT Assessment    Assessment Comments Sakina returns to PT continuing with 50% WB status and utilizing 2 crutches. Difficulty maintaining WB status with crutches, which we are continuing to work on in clinic. Pt sees MD on Thursday, at which  time, likely will be able to progress  WB status and exs in clinic. Initiated 3 way hip and weightshifting exs onto foam to progress hip strength and slow transition into further WB. No pain, other than ongoing constant discomfort into L lateral/dorsal foot. Continued review of tabitha jose juan to further improve gait pattern and make sure pt is ready to advance from AD when appropriate. She remains appropriate for skilled PT at this time.  -DR        PT Plan    PT Plan Comments how was  MD visit? any changes with WB precautions or boot/crutches? pending WB status- tandem walk, shoulder ext with TB, foam step ups?  -               User Key  (r) = Recorded By, (t) = Taken By, (c) = Cosigned By      Initials Name Provider Type    Lawson Sepulveda, PT Physical Therapist                       OP Exercises       Row Name 07/29/25 1100             Subjective    Subjective Comments I see MD on Thursday so hopefully I can get out of the boot.  -DR         Subjective Pain    Able to rate subjective pain? yes  -DR         Total Minutes    30646 - PT Therapeutic Exercise Minutes 38  -DR         Exercise 1    Exercise Name 1 Nustep  -DR      Time 1 5 min  -DR         Exercise 3    Exercise Name 3 Seated HR/TR  -DR      Cueing 3 Verbal;Lukas LOZADA      Reps 3 10  -DR      Time 3 each  -DR         Exercise 5    Exercise Name 5 Tabitha-Jose Juan w/ use of barre  -DR      Cueing 5 Verbal;Demo  -      Time 5 3 mins  -DR      Additional Comments Maintaining 50% WB on LLE, RUE w/ crutch  -DR         Exercise 6    Exercise Name 6 BAPS  -DR      Cueing 6 Verbal;Lukas LOZADA      Reps 6 20 each  -DR      Time 6 A/P, M/L, CW, CCW  -DR      Additional Comments seated, L2  -DR         Exercise 7    Exercise Name 7 fwd/lateral weightshift onto foam  -DR      Cueing 7 Verbal;Lukas LOZADA      Reps 7 20e L  -DR         Exercise 9    Exercise Name 9 long sit HS/gastroc stretch  -DR      Cueing 9 Verbal;Lukas LOZADA      Reps 9 3  -DR      Time 9 30s  -DR       Additional Comments green strap  -DR         Exercise 11    Exercise Name 11 L ankle resisted DF, PF, inversion, ,eversion  -DR      Cueing 11 Verbal;Tactile;Demo  -DR      Reps 11 20 each  -DR      Time 11 RTB  -DR         Exercise 12    Exercise Name 12 3 way hip  -DR      Cueing 12 Verbal;Demo  -DR      Reps 12 2L  -DR      Time 12 10  -DR      Additional Comments YTB ankle  -DR                User Key  (r) = Recorded By, (t) = Taken By, (c) = Cosigned By      Initials Name Provider Type    Lawson Sepulveda, PT Physical Therapist                                  PT OP Goals       Row Name 07/29/25 1100          PT Short Term Goals    STG Date to Achieve 07/31/25  -DR     STG 1 Pt will be independent and verbalized good understanding of initial HEP to improve ability to manage pain, as well as improve ankle strength and mobility.  -     STG 1 Progress Met  -     STG 2 The pt will demonstrate L ankle strength to at least 4-/5 for improved functional strength and balance.  -DR     STG 2 Progress Ongoing  -DR     STG 3 The pt will demonstrate LLE SLS to at least 10 sec for improved stair navigation and balance.  -     STG 3 Progress Ongoing  -DR     STG 4 Pt. will report ability to stand/walk for 10 minutes with pain <4/10 to facilitate return to work activities  -DR     STG 4 Progress Ongoing  -DR        Long Term Goals    LTG Date to Achieve 08/30/25  -     LTG 1 The pt will demonstrate IND and compliant with progressive HEP focused on IND condition management and return to PLOF.  -     LTG 1 Progress Ongoing  -     LTG 2 The pt will ambulate over uneven ground for 20 min with pain no greater than 4/10 for return to recreational activities.  -     LTG 2 Progress Ongoing  -     LTG 3 The pt will demonstrate L ankle strength to at least 4+/5 for improved functional strength and balance.  -     LTG 3 Progress Ongoing  -     LTG 4 The pt will demonstrate LLE SLS to at least 20 sec for improved  stair navigation and balance.  -DR PEACEG 4 Progress Ongoing  -     LTG 5 The pt will score greater than or equal to 68/84 ability on the FAAM to indicate improved perceived performance of ADLs.  -DR CHAPARRO 5 Progress Ongoing  -               User Key  (r) = Recorded By, (t) = Taken By, (c) = Cosigned By      Initials Name Provider Type    Lawson Sepulveda, PT Physical Therapist                    Therapy Education  Education Details: ongoing WB precautions with use of crutch  Given: Symptoms/condition management, Mobility training, Fall prevention and home safety, Posture/body mechanics  Program: Reinforced  How Provided: Verbal, Demonstration  Provided to: Patient  Level of Understanding: Teach back education performed, Verbalized, Demonstrated              Time Calculation:   Start Time: 1148  Stop Time: 1226  Time Calculation (min): 38 min  Timed Charges  44898 - PT Therapeutic Exercise Minutes: 38  Total Minutes  Timed Charges Total Minutes: 38   Total Minutes: 38  Therapy Charges for Today       Code Description Service Date Service Provider Modifiers Qty    01985647761 HC PT THER PROC EA 15 MIN 7/29/2025 Lawson Pink, PT GP 3                      Lawson Pink, PT  7/29/2025

## 2025-07-31 ENCOUNTER — OFFICE VISIT (OUTPATIENT)
Dept: ORTHOPEDIC SURGERY | Facility: CLINIC | Age: 60
End: 2025-07-31
Payer: MEDICAID

## 2025-07-31 ENCOUNTER — APPOINTMENT (OUTPATIENT)
Dept: PHYSICAL THERAPY | Facility: HOSPITAL | Age: 60
End: 2025-07-31
Payer: MEDICAID

## 2025-07-31 VITALS — WEIGHT: 165 LBS | HEIGHT: 67 IN | BODY MASS INDEX: 25.9 KG/M2 | TEMPERATURE: 97.5 F

## 2025-07-31 DIAGNOSIS — M21.40 PES PLANUS, UNSPECIFIED LATERALITY: ICD-10-CM

## 2025-07-31 DIAGNOSIS — M76.829 POSTERIOR TIBIAL TENDON DYSFUNCTION: Primary | ICD-10-CM

## 2025-07-31 DIAGNOSIS — Q74.2 PAIN ASSOCIATED WITH ACCESSORY NAVICULAR BONE OF FOOT, LEFT: ICD-10-CM

## 2025-07-31 DIAGNOSIS — M25.872 IMPINGEMENT SYNDROME OF LEFT ANKLE: ICD-10-CM

## 2025-07-31 DIAGNOSIS — M79.672 PAIN ASSOCIATED WITH ACCESSORY NAVICULAR BONE OF FOOT, LEFT: ICD-10-CM

## 2025-07-31 DIAGNOSIS — M25.572 SINUS TARSITIS OF LEFT FOOT: ICD-10-CM

## 2025-07-31 NOTE — PROGRESS NOTES
"Foot/ Ankle Follow Up      Patient: Sakina Guerrier    YOB: 1965 60 y.o. female    Chief Complaints: Foot \"doing well\"    History of Present Illness:patient underwent left calcaneal medial displacement osteotomy with Arthrex step plate as well as excision of accessory navicular and secondary reconstruction/advancement of the posterior tib tendon and cotton osteotomy using cancellous wedge on 5/13/2025 for chronic pes planus with lateral impingement.  Please see note from 4/28/2025 for details.  She also had previous fifth metatarsal fracture treated operatively and subsequent hardware removal and bilateral third interdigital neuroma excisions performed previously by podiatry.     Patient was seen on 5/28/2025 reporting she was doing okay.  She had been nonweightbearing and elevating and pain was improving.  She was off pain medication.  She continued on aspirin for DVT prophylaxis.  Pain was rated 4 out of 10.     I reviewed the operative procedure in detail with her and did not see any sign of infection or wound complication at that time.  She was having some sural nerve symptoms but reviewed with her that this was near the area of the plate application and had been identified at the time of surgery and was intact.  Did not see any sign of RSD.  Sutures removed and Steri-Strips were applied with sterile dressing she was placed into a well-padded cast and instructed to continue with elevation and nonweightbearing     Patient was seen on 6/11/2025 reporting that her foot and ankle were \"okay \".  She was off pain medication.  Actually I did see her when I was out at a restaurant on 5/31/2025 and she had been out shopping quite a bit that day getting around to the point that her friend had even asked her if she should call me to see if she could be up as much as she had been.  She actually fell off her scooter that day when her wide leg pants got caught in it and landed on her foot with some increased " lateral heel pain which had improved some.  Pain was rated 3 out of 10.     Overall seem to be doing well and did not see any sign of any major complication from her fall but difficult to say for sure but nothing different I would do at that point.  Reviewed with her that the sural nerve symptoms she was having should improve over time and if not been expect any functional deficits or any sign of RSD.  Steri-Strips were removed and wounds were cleaned with sterile dressings applied she was placed into a well-padded fiberglass cast in neutral dorsiflexion with instructions to continue with elevation and nonweightbearing     Patient was seen on 6/26/2025 reporting that she was doing okay.  She reported that she got a bit of achy pain over the anterolateral ankle and stil had a lack of sensation with the lateral foot.  She had been elevating and nonweightbearing.  Pain was rated at 2 out of 10.     At that time did not see any obvious sign of postoperative complication and reviewed that sural nerve was intact at the time of surgery but was near the plate and should improve over time.  She was advised to begin gentle range of motion exercises to the foot and ankle and use boot when she was up and about with power step orthotic.  She was going to do touchdown foot flat weightbearing for 2 weeks get started into physical therapy.     Patient was seen on 7/10/2025 reporting that she still had some pain in the lateral aspect of her foot but really not medially but was improving.  She had done some limited walking in her boot without her crutch.  She reported that physical therapy was helping and she had been working on stretching exercises.  Still had altered sensation in the lateral aspect of the left foot.  She was not using any orthotic in her boot.    Overall seem to be doing well and thankfully did not see any sign of RSD and reviewed the nerve symptoms should improve over time but if not would not expect any  "functional deficit.  She was advised on obtaining a power step orthotic and fitted with a new boot as her other 1 was an old black when it was fairly worn out and she said this 1 actually felt much better.  She was going to use a power step orthotic in this with the boot and allowed touchdown weightbearing for the next 10 days or so and improving to progress to 50% weightbearing with crutches.  She was to continue with therapy as well.    Patient is seen back today reporting that she is doing well.  She is doing therapy twice a week.  She knew she was  to do about 50% weightbearing but is actually been increasing that and putting pretty much full weight on it with the boot and it does not bother her.  Pain is rated at 1 out of 10  HPI    ROS: Foot pain  Past Medical History:   Diagnosis Date    Abnormal Pap smear of cervix     Acne     Acquired hypothyroidism     Anxiety     Cancer 09/2007    VULVAR CARCINOMA IN SITU    Colon polyp 2017    Found last colonoscopy    Depression     Endometriosis     GERD (gastroesophageal reflux disease) 2021    Hammertoe of right foot 04/24/2015    4TH RIGHT TOE    Hemorrhoids     Hip arthrosis 9 months    HPV (human papilloma virus) infection     Influenza A 02/06/2018    Lateral epicondylitis of right elbow 07/26/2013    Left knee pain 10/19/2017    SAW DR. GHASSAN COLE    Left ovarian cyst 08/2006    FOLLICLE CYST    Lesion of left plantar nerve     Menopause     Neuroma of foot 5 years    Surgery to correct    Onychomycosis 03/18/2014    Primary insomnia     Rheumatic fever     AS A CHILD    Right hip pain     Syncope 02/07/2018    SEEN AT [facility]    Thrombosed hemorrhoids 09/26/2017    SAW DR. FAUZIA GIRON    Varicella     Ventricular tachycardia      Physical Exam:   Vitals:    07/31/25 1332   Temp: 97.5 °F (36.4 °C)   TempSrc: Temporal   Weight: 74.8 kg (165 lb)   Height: 170.2 cm (67\")   PainSc: 1    PainLoc: Ankle     Well developed with normal mood.  Nonantalgic gait " right foot incisions are well-healed.  She at least 4 out of 5 inversion strength and was nontender in the sinus tarsi nor along the calcaneus      Radiology: 3 views left foot ordered evaluate postoperative alignment reviewed and compared to previous x-rays these show good alignment of the navicular covering the talus.  The first cuneiform osteotomy appears to be healing well as does the calcaneal osteotomy without displacement compared with previous x-rays and hardware is intact.      MRI films and report of the left hindfoot dated 11/29/2022 reviewed which showed narrowing of the tarsal sinus posteriorly with loss of fat signal in that area and adjacent mild marrow edema of the calcaneus     Plantar lateral talar cortex abuts the dorsal cortex of the anterior calcaneal process.  Posterior tib tendon maintains normal signal and thickness with no tendon sheath effusion.  There is a 6 mm accessory ossicle within the substance of the distal posterior tib tendon.  Other ankle ligaments and tendons appeared intact at that articular cartilage.        Assessment/Plan: Status post left foot/ankle surgery as outlined above     1.  Left pes planus with sinus tarsi impingement  2.  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    Overall patient seems be doing well and pleased with her outcome.  Will have her start weaning to 1 crutch under the contralateral arm still with her boot and orthotic initially around the house for 4 to 5 days and if tolerated then Graddy start doing as such out of the house.  She will do that for another week to 10 days and may then start gradually weaning off the crutches to just the boot initially around the house then gradually out of the house.  Several days prior to follow-up appointment she can try going around the house with just orthotics and straight athletic shoe and see how she does    She will continue with therapy    Anything worsens she will get off of her foot  and let me know otherwise I will see her back in 3 weeks x-rays of her left foot.

## 2025-08-05 ENCOUNTER — HOSPITAL ENCOUNTER (OUTPATIENT)
Dept: PHYSICAL THERAPY | Facility: HOSPITAL | Age: 60
Setting detail: THERAPIES SERIES
Discharge: HOME | End: 2025-08-05
Payer: MEDICAID

## 2025-08-05 DIAGNOSIS — M25.872 IMPINGEMENT SYNDROME OF LEFT ANKLE: ICD-10-CM

## 2025-08-05 DIAGNOSIS — M25.572 SINUS TARSITIS OF LEFT FOOT: ICD-10-CM

## 2025-08-05 DIAGNOSIS — M76.829 POSTERIOR TIBIAL TENDON DYSFUNCTION: Primary | ICD-10-CM

## 2025-08-05 DIAGNOSIS — M21.40 PES PLANUS, UNSPECIFIED LATERALITY: ICD-10-CM

## 2025-08-05 PROCEDURE — 97110 THERAPEUTIC EXERCISES: CPT

## 2025-08-05 PROCEDURE — 97116 GAIT TRAINING THERAPY: CPT

## 2025-08-07 ENCOUNTER — OFFICE VISIT (OUTPATIENT)
Dept: INTERNAL MEDICINE | Facility: CLINIC | Age: 60
End: 2025-08-07
Payer: MEDICAID

## 2025-08-07 ENCOUNTER — HOSPITAL ENCOUNTER (OUTPATIENT)
Dept: PHYSICAL THERAPY | Facility: HOSPITAL | Age: 60
Setting detail: THERAPIES SERIES
Discharge: HOME | End: 2025-08-07
Payer: MEDICAID

## 2025-08-07 VITALS
DIASTOLIC BLOOD PRESSURE: 84 MMHG | BODY MASS INDEX: 26.53 KG/M2 | WEIGHT: 169 LBS | SYSTOLIC BLOOD PRESSURE: 124 MMHG | OXYGEN SATURATION: 96 % | HEIGHT: 67 IN | HEART RATE: 83 BPM

## 2025-08-07 DIAGNOSIS — M25.572 SINUS TARSITIS OF LEFT FOOT: ICD-10-CM

## 2025-08-07 DIAGNOSIS — M79.672 PAIN ASSOCIATED WITH ACCESSORY NAVICULAR BONE OF FOOT, LEFT: ICD-10-CM

## 2025-08-07 DIAGNOSIS — M25.872 IMPINGEMENT SYNDROME OF LEFT ANKLE: ICD-10-CM

## 2025-08-07 DIAGNOSIS — K21.9 GASTROESOPHAGEAL REFLUX DISEASE WITHOUT ESOPHAGITIS: ICD-10-CM

## 2025-08-07 DIAGNOSIS — G89.29 CHRONIC PAIN OF LEFT ANKLE: ICD-10-CM

## 2025-08-07 DIAGNOSIS — M79.672 LEFT FOOT PAIN: ICD-10-CM

## 2025-08-07 DIAGNOSIS — Z00.00 ANNUAL PHYSICAL EXAM: Primary | ICD-10-CM

## 2025-08-07 DIAGNOSIS — R73.03 PREDIABETES: ICD-10-CM

## 2025-08-07 DIAGNOSIS — M21.40 PES PLANUS, UNSPECIFIED LATERALITY: ICD-10-CM

## 2025-08-07 DIAGNOSIS — F51.04 PSYCHOPHYSIOLOGICAL INSOMNIA: ICD-10-CM

## 2025-08-07 DIAGNOSIS — M25.572 CHRONIC PAIN OF LEFT ANKLE: ICD-10-CM

## 2025-08-07 DIAGNOSIS — M76.829 POSTERIOR TIBIAL TENDON DYSFUNCTION: Primary | ICD-10-CM

## 2025-08-07 DIAGNOSIS — Z51.81 THERAPEUTIC DRUG MONITORING: ICD-10-CM

## 2025-08-07 DIAGNOSIS — F41.9 ANXIETY: ICD-10-CM

## 2025-08-07 DIAGNOSIS — Q74.2 PAIN ASSOCIATED WITH ACCESSORY NAVICULAR BONE OF FOOT, LEFT: ICD-10-CM

## 2025-08-07 DIAGNOSIS — G47.8 DIFFICULTY WAKING: ICD-10-CM

## 2025-08-07 DIAGNOSIS — E78.2 MIXED HYPERLIPIDEMIA: ICD-10-CM

## 2025-08-07 PROBLEM — I47.29 VENTRICULAR TACHYCARDIA, NON-SUSTAINED: Status: RESOLVED | Noted: 2022-04-22 | Resolved: 2025-08-07

## 2025-08-07 PROCEDURE — 97110 THERAPEUTIC EXERCISES: CPT

## 2025-08-12 ENCOUNTER — HOSPITAL ENCOUNTER (OUTPATIENT)
Dept: PHYSICAL THERAPY | Facility: HOSPITAL | Age: 60
Setting detail: THERAPIES SERIES
Discharge: HOME | End: 2025-08-12
Payer: MEDICAID

## 2025-08-12 DIAGNOSIS — M21.40 PES PLANUS, UNSPECIFIED LATERALITY: ICD-10-CM

## 2025-08-12 DIAGNOSIS — M25.872 IMPINGEMENT SYNDROME OF LEFT ANKLE: ICD-10-CM

## 2025-08-12 DIAGNOSIS — M25.572 SINUS TARSITIS OF LEFT FOOT: ICD-10-CM

## 2025-08-12 DIAGNOSIS — M76.829 POSTERIOR TIBIAL TENDON DYSFUNCTION: Primary | ICD-10-CM

## 2025-08-12 DIAGNOSIS — G47.8 DIFFICULTY WAKING: ICD-10-CM

## 2025-08-12 PROCEDURE — 97116 GAIT TRAINING THERAPY: CPT

## 2025-08-12 PROCEDURE — 97110 THERAPEUTIC EXERCISES: CPT

## 2025-08-13 DIAGNOSIS — K21.9 GASTROESOPHAGEAL REFLUX DISEASE WITHOUT ESOPHAGITIS: Chronic | ICD-10-CM

## 2025-08-13 RX ORDER — OMEPRAZOLE 40 MG/1
40 CAPSULE, DELAYED RELEASE ORAL DAILY
Qty: 90 CAPSULE | Refills: 2 | Status: SHIPPED | OUTPATIENT
Start: 2025-08-13

## 2025-08-13 RX ORDER — CYCLOSPORINE 0.5 MG/ML
1 EMULSION OPHTHALMIC EVERY 12 HOURS
Status: CANCELLED | OUTPATIENT
Start: 2025-08-13

## 2025-08-14 ENCOUNTER — TELEPHONE (OUTPATIENT)
Dept: INTERNAL MEDICINE | Facility: CLINIC | Age: 60
End: 2025-08-14
Payer: MEDICAID

## 2025-08-14 LAB
ALBUMIN SERPL-MCNC: 4.2 G/DL (ref 3.8–4.9)
ALP SERPL-CCNC: 104 IU/L (ref 44–121)
ALT SERPL-CCNC: 23 IU/L (ref 0–32)
AST SERPL-CCNC: 28 IU/L (ref 0–40)
BASOPHILS # BLD AUTO: 0.1 X10E3/UL (ref 0–0.2)
BASOPHILS NFR BLD AUTO: 1 %
BILIRUB SERPL-MCNC: 0.2 MG/DL (ref 0–1.2)
BUN SERPL-MCNC: 14 MG/DL (ref 6–24)
BUN/CREAT SERPL: 15 (ref 9–23)
CALCIUM SERPL-MCNC: 9.4 MG/DL (ref 8.7–10.2)
CHLORIDE SERPL-SCNC: 103 MMOL/L (ref 96–106)
CHOLEST SERPL-MCNC: 189 MG/DL (ref 100–199)
CO2 SERPL-SCNC: 23 MMOL/L (ref 20–29)
CREAT SERPL-MCNC: 0.94 MG/DL (ref 0.57–1)
DRUGS UR: NORMAL
EGFRCR SERPLBLD CKD-EPI 2021: 70 ML/MIN/1.73
EOSINOPHIL # BLD AUTO: 0.1 X10E3/UL (ref 0–0.4)
EOSINOPHIL NFR BLD AUTO: 1 %
ERYTHROCYTE [DISTWIDTH] IN BLOOD BY AUTOMATED COUNT: 12.2 % (ref 11.7–15.4)
GLOBULIN SER CALC-MCNC: 3.1 G/DL (ref 1.5–4.5)
GLUCOSE SERPL-MCNC: 99 MG/DL (ref 70–99)
HBA1C MFR BLD: 5.6 % (ref 4.8–5.6)
HCT VFR BLD AUTO: 39.4 % (ref 34–46.6)
HDLC SERPL-MCNC: 61 MG/DL
HGB BLD-MCNC: 12.4 G/DL (ref 11.1–15.9)
IMM GRANULOCYTES # BLD AUTO: 0 X10E3/UL (ref 0–0.1)
IMM GRANULOCYTES NFR BLD AUTO: 0 %
LDLC SERPL CALC-MCNC: 109 MG/DL (ref 0–99)
LYMPHOCYTES # BLD AUTO: 3.2 X10E3/UL (ref 0.7–3.1)
LYMPHOCYTES NFR BLD AUTO: 32 %
MCH RBC QN AUTO: 28.2 PG (ref 26.6–33)
MCHC RBC AUTO-ENTMCNC: 31.5 G/DL (ref 31.5–35.7)
MCV RBC AUTO: 90 FL (ref 79–97)
MONOCYTES # BLD AUTO: 0.9 X10E3/UL (ref 0.1–0.9)
MONOCYTES NFR BLD AUTO: 9 %
NEUTROPHILS # BLD AUTO: 5.7 X10E3/UL (ref 1.4–7)
NEUTROPHILS NFR BLD AUTO: 57 %
PLATELET # BLD AUTO: 270 X10E3/UL (ref 150–450)
POTASSIUM SERPL-SCNC: 4.2 MMOL/L (ref 3.5–5.2)
PROT SERPL-MCNC: 7.3 G/DL (ref 6–8.5)
RBC # BLD AUTO: 4.4 X10E6/UL (ref 3.77–5.28)
SODIUM SERPL-SCNC: 141 MMOL/L (ref 134–144)
TRIGL SERPL-MCNC: 108 MG/DL (ref 0–149)
TSH SERPL DL<=0.005 MIU/L-ACNC: 3.51 UIU/ML (ref 0.45–4.5)
VLDLC SERPL CALC-MCNC: 19 MG/DL (ref 5–40)
WBC # BLD AUTO: 9.9 X10E3/UL (ref 3.4–10.8)

## 2025-08-19 DIAGNOSIS — J06.9 ACUTE URI: ICD-10-CM

## 2025-08-19 RX ORDER — AZITHROMYCIN 250 MG/1
TABLET, FILM COATED ORAL
Qty: 6 TABLET | Refills: 0 | OUTPATIENT
Start: 2025-08-19 | End: 2025-08-24

## 2025-08-21 ENCOUNTER — OFFICE VISIT (OUTPATIENT)
Dept: ORTHOPEDIC SURGERY | Facility: CLINIC | Age: 60
End: 2025-08-21
Payer: MEDICAID

## 2025-08-21 VITALS — TEMPERATURE: 98 F | WEIGHT: 169 LBS | HEIGHT: 67 IN | BODY MASS INDEX: 26.53 KG/M2

## 2025-08-21 DIAGNOSIS — M21.40 PES PLANUS, UNSPECIFIED LATERALITY: ICD-10-CM

## 2025-08-21 DIAGNOSIS — R52 PAIN: ICD-10-CM

## 2025-08-21 DIAGNOSIS — M25.872 IMPINGEMENT SYNDROME OF LEFT ANKLE: ICD-10-CM

## 2025-08-21 DIAGNOSIS — M96.89 NONUNION OF BONE AFTER OSTEOTOMY: Primary | ICD-10-CM

## 2025-08-21 DIAGNOSIS — M25.572 SINUS TARSITIS OF LEFT FOOT: ICD-10-CM

## 2025-08-21 DIAGNOSIS — Q74.2 PAIN ASSOCIATED WITH ACCESSORY NAVICULAR BONE OF FOOT, LEFT: ICD-10-CM

## 2025-08-21 DIAGNOSIS — M79.672 PAIN ASSOCIATED WITH ACCESSORY NAVICULAR BONE OF FOOT, LEFT: ICD-10-CM

## 2025-08-21 DIAGNOSIS — G57.92 NEURITIS OF LEFT FOOT: ICD-10-CM

## 2025-08-21 DIAGNOSIS — M76.829 POSTERIOR TIBIAL TENDON DYSFUNCTION: ICD-10-CM

## (undated) DEVICE — 3M™ IOBAN™ 2 ANTIMICROBIAL INCISE DRAPE 6648EZ: Brand: IOBAN™ 2

## (undated) DEVICE — TBG PENCL TELESCP MEGADYNE SMOKE EVAC 10FT

## (undated) DEVICE — NEEDLE, QUINCKE, 18GX3.5": Brand: MEDLINE

## (undated) DEVICE — PICO 7 10CM X 30CM: Brand: PICO™ 7

## (undated) DEVICE — PREP SOL POVIDONE/IODINE BT 4OZ

## (undated) DEVICE — DUAL CUT SAGITTAL BLADE

## (undated) DEVICE — ADHS SKIN SURG TISS VISC PREMIERPRO EXOFIN HI/VISC FAST/DRY

## (undated) DEVICE — DRSNG BURN ACTICOAT FLEX 7 1X24IN

## (undated) DEVICE — SOL ISO/ALC RUB 70PCT 4OZ

## (undated) DEVICE — BLD DEBAKY BEAVER MAMMATOME 8MM

## (undated) DEVICE — NDL HYPO ECLPS SFTY 18G 1 1/2IN

## (undated) DEVICE — DRAPE,REIN 53X77,STERILE: Brand: MEDLINE

## (undated) DEVICE — MAT FLR ABSORBENT LG 4FT 10 2.5FT

## (undated) DEVICE — TUBING, SUCTION, 1/4" X 10', STRAIGHT: Brand: MEDLINE

## (undated) DEVICE — SUT ETHIB 2 CV V37 MS/4 30IN MX69G

## (undated) DEVICE — TRAP FLD MINIVAC MEGADYNE 100ML

## (undated) DEVICE — PK ANT HIP 40

## (undated) DEVICE — ANTIBACTERIAL UNDYED BRAIDED (POLYGLACTIN 910), SYNTHETIC ABSORBABLE SUTURE: Brand: COATED VICRYL

## (undated) DEVICE — SYS CLS SKIN PREMIERPRO EXOFINFUSION 22CM

## (undated) DEVICE — CANN NASL CO2 TRULINK W/O2 A/

## (undated) DEVICE — TBG 02 CRUSH RESIST LF CLR 7FT

## (undated) DEVICE — PREMIUM WET SKIN PREP TRAY: Brand: MEDLINE INDUSTRIES, INC.

## (undated) DEVICE — DRAPE,U/ SHT,SPLIT,PLAS,STERIL: Brand: MEDLINE

## (undated) DEVICE — THE TORRENT IRRIGATION SCOPE CONNECTOR IS USED WITH THE TORRENT IRRIGATION TUBING TO PROVIDE IRRIGATION FLUIDS SUCH AS STERILE WATER DURING GASTROINTESTINAL ENDOSCOPIC PROCEDURES WHEN USED IN CONJUNCTION WITH AN IRRIGATION PUMP (OR ELECTROSURGICAL UNIT).: Brand: TORRENT

## (undated) DEVICE — SUT VIC 0 CT1 36IN J946H

## (undated) DEVICE — Device: Brand: DEFENDO AIR/WATER/SUCTION AND BIOPSY VALVE

## (undated) DEVICE — GLV SURG BIOGEL LTX PF 8

## (undated) DEVICE — 1010 S-DRAPE TOWEL DRAPE 10/BX: Brand: STERI-DRAPE™

## (undated) DEVICE — APPL DURAPREP IODOPHOR APL 26ML

## (undated) DEVICE — GLV SURG SENSICARE PI LF PF 8 GRN STRL

## (undated) DEVICE — MEDI-VAC YANKAUER SUCTION HANDLE W/BULBOUS TIP: Brand: CARDINAL HEALTH